# Patient Record
Sex: FEMALE | Race: WHITE | Employment: FULL TIME | URBAN - NONMETROPOLITAN AREA
[De-identification: names, ages, dates, MRNs, and addresses within clinical notes are randomized per-mention and may not be internally consistent; named-entity substitution may affect disease eponyms.]

---

## 2019-04-24 ENCOUNTER — OFFICE VISIT (OUTPATIENT)
Dept: ORTHOPEDIC SURGERY | Age: 54
End: 2019-04-24
Payer: COMMERCIAL

## 2019-04-24 VITALS — BODY MASS INDEX: 27.49 KG/M2 | HEIGHT: 65 IN | WEIGHT: 165 LBS

## 2019-04-24 DIAGNOSIS — M79.671 RIGHT FOOT PAIN: Primary | ICD-10-CM

## 2019-04-24 PROCEDURE — 99204 OFFICE O/P NEW MOD 45 MIN: CPT | Performed by: ORTHOPAEDIC SURGERY

## 2019-06-12 ENCOUNTER — OFFICE VISIT (OUTPATIENT)
Dept: INTERNAL MEDICINE CLINIC | Age: 54
End: 2019-06-12
Payer: COMMERCIAL

## 2019-06-12 VITALS
SYSTOLIC BLOOD PRESSURE: 112 MMHG | HEIGHT: 65 IN | OXYGEN SATURATION: 95 % | DIASTOLIC BLOOD PRESSURE: 88 MMHG | BODY MASS INDEX: 27.89 KG/M2 | WEIGHT: 167.4 LBS | HEART RATE: 72 BPM

## 2019-06-12 DIAGNOSIS — E55.9 VITAMIN D INSUFFICIENCY: ICD-10-CM

## 2019-06-12 DIAGNOSIS — M79.7 FIBROMYALGIA: ICD-10-CM

## 2019-06-12 DIAGNOSIS — L40.50 PSORIATIC ARTHRITIS (HCC): ICD-10-CM

## 2019-06-12 DIAGNOSIS — I10 ESSENTIAL HYPERTENSION: ICD-10-CM

## 2019-06-12 DIAGNOSIS — R73.03 PRE-DIABETES: ICD-10-CM

## 2019-06-12 DIAGNOSIS — K58.1 IRRITABLE BOWEL SYNDROME WITH CONSTIPATION: ICD-10-CM

## 2019-06-12 DIAGNOSIS — M51.36 DDD (DEGENERATIVE DISC DISEASE), LUMBAR: Primary | ICD-10-CM

## 2019-06-12 DIAGNOSIS — K76.0 FATTY LIVER: ICD-10-CM

## 2019-06-12 DIAGNOSIS — F32.A CHRONIC DEPRESSION: ICD-10-CM

## 2019-06-12 DIAGNOSIS — G47.33 OSA (OBSTRUCTIVE SLEEP APNEA): ICD-10-CM

## 2019-06-12 DIAGNOSIS — E78.5 MILD HYPERLIPIDEMIA: ICD-10-CM

## 2019-06-12 PROCEDURE — 99203 OFFICE O/P NEW LOW 30 MIN: CPT | Performed by: INTERNAL MEDICINE

## 2019-06-12 RX ORDER — TRAZODONE HYDROCHLORIDE 50 MG/1
50 TABLET ORAL NIGHTLY
Qty: 90 TABLET | Refills: 3 | Status: SHIPPED | OUTPATIENT
Start: 2019-06-12 | End: 2020-03-09 | Stop reason: ALTCHOICE

## 2019-06-12 RX ORDER — TRAZODONE HYDROCHLORIDE 50 MG/1
50 TABLET ORAL NIGHTLY
COMMUNITY
End: 2019-06-12 | Stop reason: SDUPTHER

## 2019-06-12 RX ORDER — DULOXETIN HYDROCHLORIDE 60 MG/1
60 CAPSULE, DELAYED RELEASE ORAL DAILY
COMMUNITY
End: 2019-06-12 | Stop reason: SDUPTHER

## 2019-06-12 RX ORDER — COLLAGENASE CLOSTRIDIUM HIST.
POWDER (EA) MISCELLANEOUS
COMMUNITY
End: 2020-03-04

## 2019-06-12 RX ORDER — ASCORBIC ACID 500 MG
500 TABLET ORAL DAILY
COMMUNITY
End: 2020-03-04

## 2019-06-12 RX ORDER — DULOXETIN HYDROCHLORIDE 30 MG/1
30 CAPSULE, DELAYED RELEASE ORAL DAILY
Qty: 30 CAPSULE | Refills: 3 | Status: SHIPPED | OUTPATIENT
Start: 2019-06-12 | End: 2019-08-23 | Stop reason: SDUPTHER

## 2019-06-12 RX ORDER — PREDNISONE 10 MG/1
10 TABLET ORAL DAILY
COMMUNITY
End: 2019-06-12 | Stop reason: ALTCHOICE

## 2019-06-12 RX ORDER — DULOXETIN HYDROCHLORIDE 60 MG/1
60 CAPSULE, DELAYED RELEASE ORAL DAILY
Qty: 90 CAPSULE | Refills: 3 | Status: SHIPPED | OUTPATIENT
Start: 2019-06-12 | End: 2020-03-04 | Stop reason: SDUPTHER

## 2019-06-12 ASSESSMENT — ENCOUNTER SYMPTOMS
DIARRHEA: 0
CHEST TIGHTNESS: 0
BLOOD IN STOOL: 0
CONSTIPATION: 1
SHORTNESS OF BREATH: 0

## 2019-06-12 ASSESSMENT — PATIENT HEALTH QUESTIONNAIRE - PHQ9
SUM OF ALL RESPONSES TO PHQ QUESTIONS 1-9: 1
1. LITTLE INTEREST OR PLEASURE IN DOING THINGS: 0
SUM OF ALL RESPONSES TO PHQ QUESTIONS 1-9: 1
2. FEELING DOWN, DEPRESSED OR HOPELESS: 1
SUM OF ALL RESPONSES TO PHQ9 QUESTIONS 1 & 2: 1
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
## 2019-06-23 PROBLEM — K58.1 IRRITABLE BOWEL SYNDROME WITH CONSTIPATION: Status: ACTIVE | Noted: 2019-06-23

## 2019-06-23 PROBLEM — E78.5 MILD HYPERLIPIDEMIA: Status: ACTIVE | Noted: 2019-06-23

## 2019-06-23 PROBLEM — F32.A CHRONIC DEPRESSION: Status: ACTIVE | Noted: 2019-06-23

## 2019-06-23 PROBLEM — M79.7: Status: ACTIVE | Noted: 2019-06-23

## 2019-06-23 PROBLEM — M51.36 DDD (DEGENERATIVE DISC DISEASE), LUMBAR: Status: ACTIVE | Noted: 2019-06-23

## 2019-06-23 PROBLEM — I10 ESSENTIAL HYPERTENSION: Status: ACTIVE | Noted: 2019-06-23

## 2019-06-23 PROBLEM — R73.03 PRE-DIABETES: Status: ACTIVE | Noted: 2019-06-23

## 2019-06-23 PROBLEM — G47.33 OSA (OBSTRUCTIVE SLEEP APNEA): Status: ACTIVE | Noted: 2019-06-23

## 2019-06-23 PROBLEM — K76.0 FATTY LIVER: Status: ACTIVE | Noted: 2019-06-23

## 2019-06-23 PROBLEM — L40.50 PSORIATIC ARTHRITIS (HCC): Status: ACTIVE | Noted: 2019-06-23

## 2019-06-23 PROBLEM — E55.9 VITAMIN D INSUFFICIENCY: Status: ACTIVE | Noted: 2019-06-23

## 2019-06-24 NOTE — ASSESSMENT & PLAN NOTE
Has been in therapy in the past and has found it helpful. Does not feel that she can currently afford therapy but will let me know if she feels otherwise. Doing ok with Trazodone for sleep and Cymbalta 90 mg qd.

## 2019-06-24 NOTE — ASSESSMENT & PLAN NOTE
Watching her diet- has lost 30 pounds on the keto diet. HbA1c is down to 5.6% from 6.0%. Continue to work on diet, monitor.

## 2019-06-24 NOTE — ASSESSMENT & PLAN NOTE
Successfully treated with LESI. Is more symptomatic. No recent MRI. Check MRI. Refer to Dr. Pia Pastrana for intervention.

## 2019-07-10 ENCOUNTER — HOSPITAL ENCOUNTER (OUTPATIENT)
Age: 54
Discharge: HOME | End: 2019-07-10
Payer: COMMERCIAL

## 2019-07-10 ENCOUNTER — HOSPITAL ENCOUNTER (OUTPATIENT)
Dept: MRI IMAGING | Age: 54
Discharge: HOME | End: 2019-07-10
Payer: COMMERCIAL

## 2019-07-10 DIAGNOSIS — M51.36 DDD (DEGENERATIVE DISC DISEASE), LUMBAR: ICD-10-CM

## 2019-07-10 DIAGNOSIS — I10 ESSENTIAL HYPERTENSION: ICD-10-CM

## 2019-07-10 DIAGNOSIS — E78.5 MILD HYPERLIPIDEMIA: ICD-10-CM

## 2019-07-10 DIAGNOSIS — E55.9 VITAMIN D INSUFFICIENCY: ICD-10-CM

## 2019-07-10 DIAGNOSIS — R73.03 PRE-DIABETES: ICD-10-CM

## 2019-07-10 LAB
A/G RATIO: 2 (ref 1.1–2.2)
ALBUMIN SERPL-MCNC: 4.5 G/DL (ref 3.4–5)
ALP BLD-CCNC: 89 U/L (ref 40–129)
ALT SERPL-CCNC: 16 U/L (ref 10–40)
ANION GAP SERPL CALCULATED.3IONS-SCNC: 14 MMOL/L (ref 3–16)
AST SERPL-CCNC: 15 U/L (ref 15–37)
BASOPHILS ABSOLUTE: 0 K/UL (ref 0–0.2)
BASOPHILS RELATIVE PERCENT: 0.8 %
BILIRUB SERPL-MCNC: 0.3 MG/DL (ref 0–1)
BUN BLDV-MCNC: 20 MG/DL (ref 7–20)
CALCIUM SERPL-MCNC: 9.5 MG/DL (ref 8.3–10.6)
CHLORIDE BLD-SCNC: 105 MMOL/L (ref 99–110)
CHOLESTEROL, TOTAL: 207 MG/DL (ref 0–199)
CO2: 25 MMOL/L (ref 21–32)
CREAT SERPL-MCNC: 0.7 MG/DL (ref 0.6–1.1)
EOSINOPHILS ABSOLUTE: 0.1 K/UL (ref 0–0.6)
EOSINOPHILS RELATIVE PERCENT: 3.6 %
GFR AFRICAN AMERICAN: >60
GFR NON-AFRICAN AMERICAN: >60
GLOBULIN: 2.3 G/DL
GLUCOSE BLD-MCNC: 112 MG/DL (ref 70–99)
HCT VFR BLD CALC: 40.8 % (ref 36–48)
HDLC SERPL-MCNC: 65 MG/DL (ref 40–60)
HEMOGLOBIN: 13.7 G/DL (ref 12–16)
LDL CHOLESTEROL CALCULATED: 126 MG/DL
LYMPHOCYTES ABSOLUTE: 1.4 K/UL (ref 1–5.1)
LYMPHOCYTES RELATIVE PERCENT: 38.2 %
MCH RBC QN AUTO: 31.6 PG (ref 26–34)
MCHC RBC AUTO-ENTMCNC: 33.7 G/DL (ref 31–36)
MCV RBC AUTO: 93.8 FL (ref 80–100)
MONOCYTES ABSOLUTE: 0.2 K/UL (ref 0–1.3)
MONOCYTES RELATIVE PERCENT: 6.8 %
NEUTROPHILS ABSOLUTE: 1.8 K/UL (ref 1.7–7.7)
NEUTROPHILS RELATIVE PERCENT: 50.6 %
PDW BLD-RTO: 13.8 % (ref 12.4–15.4)
PLATELET # BLD: 337 K/UL (ref 135–450)
PMV BLD AUTO: 8.3 FL (ref 5–10.5)
POTASSIUM SERPL-SCNC: 4.8 MMOL/L (ref 3.5–5.1)
RBC # BLD: 4.35 M/UL (ref 4–5.2)
SODIUM BLD-SCNC: 144 MMOL/L (ref 136–145)
TOTAL PROTEIN: 6.8 G/DL (ref 6.4–8.2)
TRIGL SERPL-MCNC: 81 MG/DL (ref 0–150)
TSH REFLEX: 1.33 UIU/ML (ref 0.27–4.2)
VITAMIN D 25-HYDROXY: 37.1 NG/ML
VLDLC SERPL CALC-MCNC: 16 MG/DL
WBC # BLD: 3.5 K/UL (ref 4–11)

## 2019-07-10 PROCEDURE — 82306 VITAMIN D 25 HYDROXY: CPT

## 2019-07-10 PROCEDURE — 72148 MRI LUMBAR SPINE W/O DYE: CPT

## 2019-07-10 PROCEDURE — 80053 COMPREHEN METABOLIC PANEL: CPT

## 2019-07-10 PROCEDURE — 36415 COLL VENOUS BLD VENIPUNCTURE: CPT

## 2019-07-10 PROCEDURE — 83036 HEMOGLOBIN GLYCOSYLATED A1C: CPT

## 2019-07-10 PROCEDURE — 85025 COMPLETE CBC W/AUTO DIFF WBC: CPT

## 2019-07-10 PROCEDURE — 84443 ASSAY THYROID STIM HORMONE: CPT

## 2019-07-10 PROCEDURE — 80061 LIPID PANEL: CPT

## 2019-07-11 ENCOUNTER — TELEPHONE (OUTPATIENT)
Dept: INTERNAL MEDICINE CLINIC | Age: 54
End: 2019-07-11

## 2019-07-11 DIAGNOSIS — D72.819 LEUKOPENIA, UNSPECIFIED TYPE: Primary | ICD-10-CM

## 2019-07-11 LAB
ESTIMATED AVERAGE GLUCOSE: 122.6 MG/DL
HBA1C MFR BLD: 5.9 %

## 2019-07-12 ENCOUNTER — OFFICE VISIT (OUTPATIENT)
Dept: ORTHOPEDIC SURGERY | Age: 54
End: 2019-07-12
Payer: COMMERCIAL

## 2019-07-12 VITALS
WEIGHT: 167 LBS | SYSTOLIC BLOOD PRESSURE: 132 MMHG | DIASTOLIC BLOOD PRESSURE: 78 MMHG | BODY MASS INDEX: 27.82 KG/M2 | HEIGHT: 65 IN

## 2019-07-12 DIAGNOSIS — M47.816 SPONDYLOSIS WITHOUT MYELOPATHY OR RADICULOPATHY, LUMBAR REGION: ICD-10-CM

## 2019-07-12 DIAGNOSIS — M47.816 FACET HYPERTROPHY OF LUMBAR REGION: Primary | ICD-10-CM

## 2019-07-12 DIAGNOSIS — M51.36 DDD (DEGENERATIVE DISC DISEASE), LUMBAR: ICD-10-CM

## 2019-07-12 PROCEDURE — 99244 OFF/OP CNSLTJ NEW/EST MOD 40: CPT | Performed by: PHYSICIAN ASSISTANT

## 2019-07-12 RX ORDER — MELOXICAM 15 MG/1
15 TABLET ORAL DAILY
Qty: 30 TABLET | Refills: 0 | Status: SHIPPED | OUTPATIENT
Start: 2019-07-12 | End: 2019-07-17 | Stop reason: SINTOL

## 2019-07-12 NOTE — PROGRESS NOTES
Relation Age of Onset    Cancer Mother         gallbladder cancer    High Blood Pressure Mother     Alcohol Abuse Mother     High Blood Pressure Father    Tere Schreiber Cancer Sister         small cell lung cancer (smoker)    Heart Disease Maternal Grandmother     Heart Disease Maternal Grandfather     Stroke Maternal Grandfather     Heart Disease Paternal Grandmother     Heart Disease Paternal Grandfather     No Known Problems Half-Brother     No Known Problems Half-Sister     Other Half-Sibling         skin cancer    Other Half-Sister         blood clots    Substance Abuse Daughter 32        in remission          REVIEW OF SYSTEMS: Full ROS reviewed & scanned from 7/15/2019           PHYSICAL EXAM:    Vitals: Blood pressure 132/78, height 5' 5\" (1.651 m), weight 167 lb (75.8 kg). GENERAL EXAM:  · General Apparence: Patient is adequately groomed with no evidence of malnutrition. · Psychiatric: Orientation: The patient is oriented to time, place and person. The patient's mood and affect are appropriate   · Vascular: Examination reveals no swelling and palpation reveals no tenderness in upper or lower extremities. Good capillary refill. · The lymphatic examination of the neck, axillae and groin reveals all areas to be without enlargement or induration   Sensation is intact without deficit in the upper and lower extremities to light touch and pinprick  · Coordination of the upper and lower extremities are normal.    CERVICAL EXAMINATION:  · Inspection: Local inspection shows no step-off or bruising. Cervical alignment is normal. No instability is noted. · Palpation and Percussion: No evidence of tenderness at the midline. Paraspinal tenderness is not present. There is no paraspinal spasm. · Range of Motion:  pain-free ROM   · Strength: 5/5 bilateral upper extremities  · Special Tests:   Spurling's and Strong's are negative bilaterally.     Cohn and Impingement tests are negative right lower extremity does not show any tenderness, deformity or injury. Range of motion is unremarkable. There is no gross instability. There are no rashes, ulcerations or lesions. Strength and tone are normal. No atrophy or abnormal movements are noted. · LEFT LOWER EXTREMITY:  Inspection/examination of the left lower extremity does not show any tenderness, deformity or injury. Range of motion is unremarkable. There is no gross instability. There are no rashes, ulcerations or lesions. Strength and tone are normal. No atrophy or abnormal movements are noted. Diagnostic Testing:    Xrays:   None  MRI or CT:  MRI of the Lumbar Spine from 7/10/19   Impression   Mild neural foraminal narrowing.  No significant spinal canal stenosis.      EMG:  None  Results for orders placed or performed during the hospital encounter of 07/10/19   Lipid Panel   Result Value Ref Range    Cholesterol, Total 207 (H) 0 - 199 mg/dL    Triglycerides 81 0 - 150 mg/dL    HDL 65 (H) 40 - 60 mg/dL    LDL Calculated 126 (H) <100 mg/dL    VLDL Cholesterol Calculated 16 Not Established mg/dL   Hemoglobin A1C   Result Value Ref Range    Hemoglobin A1C 5.9 See comment %    eAG 122.6 mg/dL   Comprehensive Metabolic Panel   Result Value Ref Range    Sodium 144 136 - 145 mmol/L    Potassium 4.8 3.5 - 5.1 mmol/L    Chloride 105 99 - 110 mmol/L    CO2 25 21 - 32 mmol/L    Anion Gap 14 3 - 16    Glucose 112 (H) 70 - 99 mg/dL    BUN 20 7 - 20 mg/dL    CREATININE 0.7 0.6 - 1.1 mg/dL    GFR Non-African American >60 >60    GFR African American >60 >60    Calcium 9.5 8.3 - 10.6 mg/dL    Total Protein 6.8 6.4 - 8.2 g/dL    Alb 4.5 3.4 - 5.0 g/dL    Albumin/Globulin Ratio 2.0 1.1 - 2.2    Total Bilirubin 0.3 0.0 - 1.0 mg/dL    Alkaline Phosphatase 89 40 - 129 U/L    ALT 16 10 - 40 U/L    AST 15 15 - 37 U/L    Globulin 2.3 g/dL   CBC Auto Differential   Result Value Ref Range    WBC 3.5 (L) 4.0 - 11.0 K/uL    RBC 4.35 4.00 - 5.20 M/uL    Hemoglobin 13.7

## 2019-07-17 ENCOUNTER — TELEPHONE (OUTPATIENT)
Dept: ORTHOPEDIC SURGERY | Age: 54
End: 2019-07-17

## 2019-07-17 ENCOUNTER — OFFICE VISIT (OUTPATIENT)
Dept: INTERNAL MEDICINE CLINIC | Age: 54
End: 2019-07-17
Payer: COMMERCIAL

## 2019-07-17 VITALS
HEART RATE: 72 BPM | WEIGHT: 170.2 LBS | OXYGEN SATURATION: 96 % | BODY MASS INDEX: 28.32 KG/M2 | DIASTOLIC BLOOD PRESSURE: 88 MMHG | SYSTOLIC BLOOD PRESSURE: 130 MMHG

## 2019-07-17 DIAGNOSIS — M51.36 DDD (DEGENERATIVE DISC DISEASE), LUMBAR: ICD-10-CM

## 2019-07-17 DIAGNOSIS — R10.12 ACUTE LUQ PAIN: Primary | ICD-10-CM

## 2019-07-17 PROCEDURE — 99214 OFFICE O/P EST MOD 30 MIN: CPT | Performed by: INTERNAL MEDICINE

## 2019-07-17 RX ORDER — TRAMADOL HYDROCHLORIDE 50 MG/1
50 TABLET ORAL EVERY 8 HOURS PRN
Qty: 30 TABLET | Refills: 0 | Status: SHIPPED | OUTPATIENT
Start: 2019-07-17 | End: 2019-10-29 | Stop reason: SDUPTHER

## 2019-07-17 ASSESSMENT — ENCOUNTER SYMPTOMS
SHORTNESS OF BREATH: 0
BLOOD IN STOOL: 0
BACK PAIN: 1
CONSTIPATION: 0
CHEST TIGHTNESS: 0
DIARRHEA: 0
ABDOMINAL PAIN: 1

## 2019-07-17 NOTE — ASSESSMENT & PLAN NOTE
Patient is seen Dr. Luba Young and is scheduled for lumbar steroid epidural injections in August.  She does not tolerate NSAIDs. Even Mobic upset her stomach. Add tramadol 50 mg 1/2-1 every 8 hours as needed #30. She is well aware of the addiction potential which we again discussed today. She does have a strong family history of substance abuse.

## 2019-07-17 NOTE — PROGRESS NOTES
No Known Allergies   Past Medical History:   Diagnosis Date    Chronic depression     DDD (degenerative disc disease), lumbar     Fibromyalgia     HTN (hypertension)     KARTHIK (obstructive sleep apnea)     Psoriatic arthritis ([facility] [address]       Past Surgical History:   Procedure Laterality Date    BLADDER SUSPENSION  2004     SECTION  08/31/1987    x1    CHOLECYSTECTOMY  2000    ENDOMETRIAL ABLATION  2007    HYSTERECTOMY, TOTAL ABDOMINAL  2009    heavy menses    NASAL SEPTUM SURGERY  2005    TURBINATE RESECTION  2007      Social History     Socioeconomic History    Marital status:      Spouse name: Not on file    Number of children: Not on file    Years of education: Not on file    Highest education level: Not on file   Occupational History    Not on file   Social Needs    Financial resource strain: Not on file    Food insecurity:     Worry: Not on file     Inability: Not on file    Transportation needs:     Medical: Not on file     Non-medical: Not on file   Tobacco Use    Smoking status: Never Smoker    Smokeless tobacco: Never Used   Substance and Sexual Activity    Alcohol use: Not on file     Comment: occasional     Drug use: Never    Sexual activity: Not on file   Lifestyle    Physical activity:     Days per week: Not on file     Minutes per session: Not on file    Stress: Not on file   Relationships    Social connections:     Talks on phone: Not on file     Gets together: Not on file     Attends [employer] service: Not on file     Active member of club or organization: Not on file     Attends meetings of clubs or organizations: Not on file     Relationship status: Not on file    Intimate partner violence:     Fear of current or ex partner: Not on file     Emotionally abused: Not on file     Physically abused: Not on file     Forced sexual activity: Not on file   Other Topics Concern    Not on file   Social History Narrative    Not on file     Family History Problem Relation Age of Onset    Cancer Mother         gallbladder cancer    High Blood Pressure Mother     Alcohol Abuse Mother     High Blood Pressure Father    [de-identified] Cancer Sister         small cell lung cancer (smoker)    Heart Disease Maternal Grandmother     Heart Disease Maternal Grandfather     Stroke Maternal Grandfather     Heart Disease Paternal Grandmother     Heart Disease Paternal Grandfather     No Known Problems Half-Brother     No Known Problems Half-Sister     Other Half-Sibling         skin cancer    Other Half-Sister         blood clots    Substance Abuse Daughter 32        in remission         Outpatient Medications Prior to Visit   Medication Sig Dispense Refill    vitamin C (ASCORBIC ACID) 500 MG tablet Take 500 mg by mouth daily      Collagenase POWD by Does not apply route      DULoxetine (CYMBALTA) 60 MG extended release capsule Take 1 capsule by mouth daily 90 capsule 3    traZODone (DESYREL) 50 MG tablet Take 1 tablet by mouth nightly 90 tablet 3    metoprolol tartrate (LOPRESSOR) 25 MG tablet Take 1 tablet by mouth 2 times daily 180 tablet 3    DULoxetine (CYMBALTA) 30 MG extended release capsule Take 1 capsule by mouth daily 30 capsule 3    meloxicam (MOBIC) 15 MG tablet Take 1 tablet by mouth daily 30 tablet 0     No facility-administered medications prior to visit. Patient'spast medical history, surgical history, family history, medications,  and allergies  were all reviewed and updated as appropriate today. Review of Systems   Constitutional: Negative for appetite change, fatigue and fever. Respiratory: Negative for chest tightness and shortness of breath. Cardiovascular: Negative for chest pain. Gastrointestinal: Positive for abdominal pain. Negative for blood in stool, constipation and diarrhea. Genitourinary: Negative for dysuria. Musculoskeletal: Positive for back pain. Skin: Negative for rash.        /88   Pulse 72   Wt 170 lb 3.2

## 2019-07-17 NOTE — TELEPHONE ENCOUNTER
PATIENT CALLED AND STATED THAT THE PRESCRIPTION THAT SHE'S TAKING IS UPSETTING HER STOMACH, AND SHE NEEDS TO RS HER PROCEDURE. PATIENT CAN BE REACHED @[phone].

## 2019-07-18 ENCOUNTER — TELEPHONE (OUTPATIENT)
Dept: INTERNAL MEDICINE CLINIC | Age: 54
End: 2019-07-18

## 2019-07-18 DIAGNOSIS — R10.12 ACUTE LUQ PAIN: Primary | ICD-10-CM

## 2019-07-23 NOTE — PROGRESS NOTES
Carmin Hodgkins, MD  [facility]) Physicians  [facility] [place] [facility] Rhe[facility]  Rheumatology Consult Note    HISTORY OF PRESENT ILLNESS:    The pt is a 47 y.o. female referred by Anant Levine DO for PsA. Pt reports longstanding PsO, she follows w/  Dermatology. She reports mildly active psoriatic lesions in her palms and R foot, most recent flare was 3 yrs ago. Pt states she was Dx w/ fibromyalgia in her 35s. Pt states she was Dx w/ PsA by a rheumatologist in [place], [place] approximately 3.5 yrs ago. Pt states she tried oral MTX which gave her thrush, she thinks this \"worked a little bit\". SSZ also gave her thrush. Humira was [employer]" but it stopped working after a few months. Pt states she was then switched to Stelara which per pt worked mainly for her joints - she felt this worked but stopped after two months d/t insurance issues. She felt both her PsO and PsA had remained largely in \"remission\" until recently. Pt reports chronic low back and buttock pain since her 30s - pain is constant and exacerbated by prolonged sitting, standing, as well as standing. She reports morning stiffness in her lower back lasting an hr, denies nocturnal awakening from pain. Pt has established care w/ PM (Dr. Live Hernandez) for chronic back pain 2/2 DDD, spondylosis and facet arthropathy of L-spine, YEHUDA previously helped. Pt states she recently had a MRI of L-spine and has established care w/ a spine specialist who suggested facet injections but she has concerns about the safety of these injections - she is worried if they would affect her \"bone density\". Pt reports a 5-6 month Hx of joint pain and stiffness in her hands. She wakes up in the middle of the night feeling stiff in her hands. Morning stiffness lasts >30 min. Joint pain in the hands is exacerbated by activity such as water coloring. She reports generalized swelling in her hands in the morning but denies Hx of dactylitis.   She Sister         small cell lung cancer (smoker)    Heart Disease Maternal Grandmother     Heart Disease Maternal Grandfather     Stroke Maternal Grandfather     Heart Disease Paternal Grandmother     Heart Disease Paternal Grandfather     No Known Problems Half-Brother     No Known Problems Half-Sister     Other Half-Sibling         skin cancer    Other Half-Sister         blood clots    Substance Abuse Daughter 32        in remission        MEDICATIONS:    Current Outpatient Medications:     Cholecalciferol (VITAMIN D3) 2000 units CAPS, Take by mouth, Disp: , Rfl:     traMADol (ULTRAM) 50 MG tablet, Take 1 tablet by mouth every 8 hours as needed for Pain for up to 10 days. Intended supply: 5 days. Take lowest dose possible to manage pain, Disp: 30 tablet, Rfl: 0    vitamin C (ASCORBIC ACID) 500 MG tablet, Take 500 mg by mouth daily, Disp: , Rfl:     Collagenase POWD, by Does not apply route, Disp: , Rfl:     DULoxetine (CYMBALTA) 60 MG extended release capsule, Take 1 capsule by mouth daily, Disp: 90 capsule, Rfl: 3    traZODone (DESYREL) 50 MG tablet, Take 1 tablet by mouth nightly, Disp: 90 tablet, Rfl: 3    metoprolol tartrate (LOPRESSOR) 25 MG tablet, Take 1 tablet by mouth 2 times daily, Disp: 180 tablet, Rfl: 3    DULoxetine (CYMBALTA) 30 MG extended release capsule, Take 1 capsule by mouth daily, Disp: 30 capsule, Rfl: 3    ALLERGIES:  Patient has no known allergies.     PHYSICAL EXAM:    Vitals:    /67 (Site: Right Upper Arm, Position: Sitting, Cuff Size: Medium Adult)   Pulse 61   Ht 5' 5\" (1.651 m)   Wt 173 lb (78.5 kg)   BMI 28.79 kg/m²     GEN: AAOx3, in NAD, well-appearing  HEAD: normocephalic, atraumatic  EYES: EOMI, PERRLA, no injection or icterus  NOSE: no nasal ulcers or nasal drainage  ORAL CAVITY: moist oral mucosa w/ good saliva pooling, no oral lesions, no evidence of thrush, no evidence of parotid gland enlargement  NECK: supple w/ FROM, of evidence of chondrocalcinosis    XR HAND RIGHT (MIN 3 VIEWS)     Standing Status:   Future     Standing Expiration Date:   7/24/2020     Scheduling Instructions:      Evaluate for joint space narrowing, erosion, and chondrocalcinosis     Order Specific Question:   Reason for exam:     Answer:   Hx of psoriatic arthritis, Evaluate for joint space narrowing, erosion, and chondrocalcinosis    C-Reactive Protein     Standing Status:   Future     Number of Occurrences:   1     Standing Expiration Date:   1/24/2020    Sedimentation Rate     Standing Status:   Future     Number of Occurrences:   1     Standing Expiration Date:   1/24/2020    Hepatitis B Core Antibody, Total     Standing Status:   Future     Number of Occurrences:   1     Standing Expiration Date:   8/24/2019    Hepatitis C Antibody     Standing Status:   Future     Number of Occurrences:   1     Standing Expiration Date:   8/24/2019    Hepatitis B Surface Antigen     Standing Status:   Future     Number of Occurrences:   1     Standing Expiration Date:   2/90/5304    Cyclic Citrul Peptide Antibody, IgG     Standing Status:   Future     Number of Occurrences:   1     Standing Expiration Date:   1/24/2020    Rheumatoid Factor     Standing Status:   Future     Number of Occurrences:   1     Standing Expiration Date:   1/24/2020    JULIANO Reflex to Antibody Cascade     Standing Status:   Future     Number of Occurrences:   1     Standing Expiration Date:   1/24/2020    Anti SSA     Standing Status:   Future     Number of Occurrences:   1     Standing Expiration Date:   1/24/2020    Anti SSB     Standing Status:   Future     Number of Occurrences:   1     Standing Expiration Date:   1/24/2020    C3 Complement     Standing Status:   Future     Number of Occurrences:   1     Standing Expiration Date:   1/24/2020    C4 Complement     Standing Status:   Future     Number of Occurrences:   1     Standing Expiration Date:   1/24/2020    Urinalysis with Microscopic Standing Status:   Future     Number of Occurrences:   1     Standing Expiration Date:   1/24/2020     Order Specific Question:   SPECIFY(EX-CATH,MIDSTREAM,CYSTO,ETC)? Answer:   midstream    HLA-B27 Antigen     Standing Status:   Future     Number of Occurrences:   1     Standing Expiration Date:   8/24/2019    HIV Screen     Standing Status:   Future     Number of Occurrences:   1     Standing Expiration Date:   8/24/2019     Outpatient Encounter Medications as of 7/24/2019   Medication Sig Dispense Refill    Cholecalciferol (VITAMIN D3) 2000 units CAPS Take by mouth      traMADol (ULTRAM) 50 MG tablet Take 1 tablet by mouth every 8 hours as needed for Pain for up to 10 days. Intended supply: 5 days. Take lowest dose possible to manage pain 30 tablet 0    vitamin C (ASCORBIC ACID) 500 MG tablet Take 500 mg by mouth daily      Collagenase POWD by Does not apply route      DULoxetine (CYMBALTA) 60 MG extended release capsule Take 1 capsule by mouth daily 90 capsule 3    traZODone (DESYREL) 50 MG tablet Take 1 tablet by mouth nightly 90 tablet 3    metoprolol tartrate (LOPRESSOR) 25 MG tablet Take 1 tablet by mouth 2 times daily 180 tablet 3    DULoxetine (CYMBALTA) 30 MG extended release capsule Take 1 capsule by mouth daily 30 capsule 3     No facility-administered encounter medications on file as of 7/24/2019. Return in about 2 weeks (around 8/7/2019) for lab result discussion and treatment plan. 7/24/2019 9:45 AM      Sending consult note to referring provider Shivam Forrest DO. Thank you very much for allowing me to participate in this pt's care. Please do not hesitate to contact me if I can be of further assistance. Tyler Farmer MD  [facility]) Physicians  [facility] [place] and Rheumat[facility]  [address] [place] [address] Dr Guzman S White , [address] [place] [address]:[phone]  SAMI:[phone]    NOTE: This report is transcribed by using voice recognition software dragon.  Every effort is made

## 2019-07-24 ENCOUNTER — OFFICE VISIT (OUTPATIENT)
Dept: RHEUMATOLOGY | Age: 54
End: 2019-07-24
Payer: COMMERCIAL

## 2019-07-24 ENCOUNTER — HOSPITAL ENCOUNTER (OUTPATIENT)
Dept: GENERAL RADIOLOGY | Age: 54
Discharge: HOME | End: 2019-07-24
Payer: COMMERCIAL

## 2019-07-24 ENCOUNTER — HOSPITAL ENCOUNTER (OUTPATIENT)
Age: 54
Discharge: HOME | End: 2019-07-24
Payer: COMMERCIAL

## 2019-07-24 ENCOUNTER — HOSPITAL ENCOUNTER (OUTPATIENT)
Dept: CT IMAGING | Age: 54
Discharge: HOME | End: 2019-07-24
Payer: COMMERCIAL

## 2019-07-24 VITALS
BODY MASS INDEX: 28.82 KG/M2 | DIASTOLIC BLOOD PRESSURE: 67 MMHG | HEIGHT: 65 IN | WEIGHT: 173 LBS | SYSTOLIC BLOOD PRESSURE: 111 MMHG | HEART RATE: 61 BPM

## 2019-07-24 DIAGNOSIS — M53.3 COCCYGEAL PAIN, ACUTE: ICD-10-CM

## 2019-07-24 DIAGNOSIS — L40.50 PSORIATIC ARTHRITIS (HCC): ICD-10-CM

## 2019-07-24 DIAGNOSIS — Z79.899 HIGH RISK MEDICATION USE: ICD-10-CM

## 2019-07-24 DIAGNOSIS — D72.819 LEUKOPENIA, UNSPECIFIED TYPE: ICD-10-CM

## 2019-07-24 DIAGNOSIS — G89.29 CHRONIC BACK PAIN GREATER THAN 3 MONTHS DURATION: ICD-10-CM

## 2019-07-24 DIAGNOSIS — L40.50 PSORIATIC ARTHRITIS (HCC): Primary | ICD-10-CM

## 2019-07-24 DIAGNOSIS — R10.12 ACUTE LUQ PAIN: ICD-10-CM

## 2019-07-24 DIAGNOSIS — M51.36 DDD (DEGENERATIVE DISC DISEASE), LUMBAR: ICD-10-CM

## 2019-07-24 DIAGNOSIS — M35.00 SICCA COMPLEX (HCC): ICD-10-CM

## 2019-07-24 DIAGNOSIS — M54.9 CHRONIC BACK PAIN GREATER THAN 3 MONTHS DURATION: ICD-10-CM

## 2019-07-24 DIAGNOSIS — L40.9 PSORIASIS: ICD-10-CM

## 2019-07-24 DIAGNOSIS — L65.9 HAIR THINNING: ICD-10-CM

## 2019-07-24 DIAGNOSIS — M79.7 FIBROMYALGIA: ICD-10-CM

## 2019-07-24 PROCEDURE — 74176 CT ABD & PELVIS W/O CONTRAST: CPT

## 2019-07-24 PROCEDURE — 73562 X-RAY EXAM OF KNEE 3: CPT

## 2019-07-24 PROCEDURE — 72200 X-RAY EXAM SI JOINTS: CPT

## 2019-07-24 PROCEDURE — 99244 OFF/OP CNSLTJ NEW/EST MOD 40: CPT | Performed by: INTERNAL MEDICINE

## 2019-07-24 PROCEDURE — 73130 X-RAY EXAM OF HAND: CPT

## 2019-07-24 PROCEDURE — 6360000004 HC RX CONTRAST MEDICATION: Performed by: INTERNAL MEDICINE

## 2019-07-24 RX ORDER — ACETAMINOPHEN 160 MG
TABLET,DISINTEGRATING ORAL
COMMUNITY

## 2019-07-24 RX ADMIN — IOHEXOL 50 ML: 240 INJECTION, SOLUTION INTRATHECAL; INTRAVASCULAR; INTRAVENOUS; ORAL at 19:31

## 2019-07-25 ENCOUNTER — TELEPHONE (OUTPATIENT)
Dept: INTERNAL MEDICINE CLINIC | Age: 54
End: 2019-07-25

## 2019-07-26 ENCOUNTER — TELEPHONE (OUTPATIENT)
Dept: INTERNAL MEDICINE CLINIC | Age: 54
End: 2019-07-26

## 2019-07-29 ENCOUNTER — TELEPHONE (OUTPATIENT)
Dept: ORTHOPEDIC SURGERY | Age: 54
End: 2019-07-29

## 2019-08-05 ENCOUNTER — TELEPHONE (OUTPATIENT)
Dept: ORTHOPEDIC SURGERY | Age: 54
End: 2019-08-05

## 2019-08-05 NOTE — TELEPHONE ENCOUNTER
Patients procedure scheduled for 8/7/2109 has been denied. Can we do a peer to peer on Tuesday? Information is in previous message attached. Thanks.

## 2019-08-07 ENCOUNTER — TELEPHONE (OUTPATIENT)
Dept: ORTHOPEDIC SURGERY | Age: 54
End: 2019-08-07

## 2019-08-12 NOTE — PROGRESS NOTES
this is exacerbated by physical activity. Worst pain is in her back most of the time. Back of neck hurts. She reports frequent tension headaches. She reports generalized stiffness (hands, back) lasting 30 min to 1 hr which improves w/ activity. Pt states she cancelled her facet joint injections d/t high out of pocket cost.    Skin has been clear for the past 3 yrs.     Rheumatologic ROS:  Constitutional: denies chronic fatigue, fever/chills, night sweats, unintentional weight loss  Integumentary: +chronic diffuse hair thinning, denies photosensitivity, rash, or Sx of Raynaud's phenomenon  Eyes: +dry eyes lately she feels \"they're irritating\" has not had any recent eye exam, denies redness or pain, visual disturbance, or floaters  Ears: denies hearing loss, tinnitus, vertigo, or recurrent ear infections  Nose: denies nasal ulcers or recurrent sinusitis  Oral cavity: +dry mouth, denies oral ulcers  Cardiovascular: denies CP, palpitations, Hx of pericardial effusion or pericarditis  Respiratory: denies SOB, cough, hemoptysis, or pleurisy  Gastrointestinal: +chronic GERD, denies chronic diarrhea or bloody stools  Genitourinary: +chronic foul smelling urine (per pt smells like \"chicken and bone broth\") and increased urinary hesitancy, denies frothy urine or Hx of nephrolithiasis  Hematologic/Lymphatic: denies abnormal bruising or bleeding, denies Hx of blood clots or recurrent miscarriages, denies swollen LNs  Musculoskeletal:  refer to above HPI   Neurological: denies focal weakness, paresthesias/hyperesthesias or change in sensation, denies Hx of seizure, denies change in gait, balance, or memory  Psychiatric: feels depression and anxiety are well controlled on Cymbalta  Endocrine: denies cold or heat intolerance  Allergic/Immunologic: denies nasal congestion, chronic asthma, or hives    No Known Allergies    Past Medical History:        Diagnosis Date    Chronic depression     DDD (degenerative disc disease), lumbar     Fibromyalgia     HTN (hypertension)     KARTHIK (obstructive sleep apnea)     Psoriatic arthritis (HCC)        Past Surgical History:        Procedure Laterality Date    BLADDER SUSPENSION  2004     SECTION  08/31/1987    x1    CHOLECYSTECTOMY  2000    ENDOMETRIAL ABLATION  2007    HYSTERECTOMY, TOTAL ABDOMINAL  2009    heavy menses    NASAL SEPTUM SURGERY  2005    TURBINATE RESECTION         Medications:    Current Outpatient Medications   Medication Sig Dispense Refill    celecoxib (CELEBREX) 100 MG capsule Take 1 capsule by mouth 2 times daily 180 capsule 2    gabapentin (NEURONTIN) 300 MG capsule Take 1 capsule by mouth 3 times daily for 180 days. Intended supply: 90 days 270 capsule 0    predniSONE (DELTASONE) 10 MG tablet Take 2 tablets by mouth daily for 10 days, THEN 1 tablet daily for 10 days, THEN 0.5 tablets daily for 10 days. 35 tablet 1    Cholecalciferol (VITAMIN D3) 2000 units CAPS Take by mouth      vitamin C (ASCORBIC ACID) 500 MG tablet Take 500 mg by mouth daily      Collagenase POWD by Does not apply route      DULoxetine (CYMBALTA) 60 MG extended release capsule Take 1 capsule by mouth daily 90 capsule 3    traZODone (DESYREL) 50 MG tablet Take 1 tablet by mouth nightly 90 tablet 3    metoprolol tartrate (LOPRESSOR) 25 MG tablet Take 1 tablet by mouth 2 times daily 180 tablet 3    DULoxetine (CYMBALTA) 30 MG extended release capsule Take 1 capsule by mouth daily 30 capsule 3     No current facility-administered medications for this visit.          OBJECTIVE:  Physical Exam:    /89 (Site: Left Lower Arm, Position: Sitting, Cuff Size: Medium Adult)   Pulse 64   Wt 172 lb (78 kg)   BMI 28.62 kg/m²     GEN: AAOx3, in NAD, well-appearing  HEAD: normocephalic, atraumatic  EYES: EOMI, PERRLA, no injection or icterus  NOSE: no nasal ulcers or nasal drainage  ORAL CAVITY: moist oral mucosa w/ good saliva pooling, no oral lesions, no evidence of thrush, no 07/10/2019    BUN 20 07/10/2019    CREATININE 0.7 07/10/2019    GLUCOSE 112 (H) 07/10/2019    CALCIUM 9.5 07/10/2019    PROT 6.8 07/10/2019    LABALBU 4.5 07/10/2019    BILITOT 0.3 07/10/2019    ALKPHOS 89 07/10/2019    AST 15 07/10/2019    ALT 16 07/10/2019    LABGLOM >60 07/10/2019    GFRAA >60 07/10/2019    AGRATIO 2.0 07/10/2019    GLOB 2.3 07/10/2019     Vitamin D 25 hydroxy (7/10/19): 37.1  TSH (7/10/19) wnl  HgbA1c (7/10/19) 5.9    Labs from 7/24/19:  CRP and ESR wnl  Negative RF, CCP, HLA B27  Negative JULIANO, SSA, SSB  C3 and C4 wnl  Negative hepatitis B and C serologies, Quantiferon TB, HIV screen    Imaging:   I personally reviewed interval imaging and discussed w/ the pt in detail which included:  MRI L-spine (7/10/19): Mild neural foraminal narrowing.  No significant spinal canal stenosis. X-rays (7/24/19):  R hand:  No fracture or dislocation. No underlying degenerative changes. Joint spaces are normal with no significant osteophytes. No inflammatory changes. No erosions. No soft tissue swelling. L hand:  No fracture or dislocation. No underlying degenerative changes. Joint spaces are normal with no significant osteophytes. No inflammatory changes. No erosions. No soft tissue swelling. R knee:  No fracture or dislocation. No underlying degenerative changes. Joint spaces are normal with no significant osteophytes. No inflammatory changes. No erosions. No soft tissue swelling. L knee:  No fracture or dislocation. No underlying degenerative changes. Joint spaces are normal with no significant osteophytes. No inflammatory changes. No erosions. No soft tissue swelling. SI joints:  Normal, symmetric appearance of the b/l SI joints. No widening or significant sclerosis. No erosions are appreciated. Pelvic bones are otherwise unremarkable. No significant soft tissue findings.      I independently reviewed above X-rays - mild OA changes in the b/l PIP joints otherwise well preserved joint spaces,

## 2019-08-13 ENCOUNTER — OFFICE VISIT (OUTPATIENT)
Dept: RHEUMATOLOGY | Age: 54
End: 2019-08-13
Payer: COMMERCIAL

## 2019-08-13 ENCOUNTER — TELEPHONE (OUTPATIENT)
Dept: RHEUMATOLOGY | Age: 54
End: 2019-08-13

## 2019-08-13 VITALS
HEART RATE: 64 BPM | DIASTOLIC BLOOD PRESSURE: 89 MMHG | BODY MASS INDEX: 28.62 KG/M2 | SYSTOLIC BLOOD PRESSURE: 133 MMHG | WEIGHT: 172 LBS

## 2019-08-13 DIAGNOSIS — R82.90 FOUL SMELLING URINE: ICD-10-CM

## 2019-08-13 DIAGNOSIS — M79.7 FIBROMYALGIA: ICD-10-CM

## 2019-08-13 DIAGNOSIS — R35.0 URINE FREQUENCY: ICD-10-CM

## 2019-08-13 DIAGNOSIS — L40.50 PSORIATIC ARTHRITIS (HCC): Primary | ICD-10-CM

## 2019-08-13 PROCEDURE — 99214 OFFICE O/P EST MOD 30 MIN: CPT | Performed by: INTERNAL MEDICINE

## 2019-08-13 RX ORDER — GABAPENTIN 300 MG/1
300 CAPSULE ORAL 3 TIMES DAILY
Qty: 270 CAPSULE | Refills: 0 | Status: SHIPPED | OUTPATIENT
Start: 2019-08-13 | End: 2019-11-25

## 2019-08-13 RX ORDER — PREDNISONE 10 MG/1
TABLET ORAL
Qty: 35 TABLET | Refills: 1 | Status: SHIPPED | OUTPATIENT
Start: 2019-08-13 | End: 2019-09-12

## 2019-08-13 RX ORDER — CELECOXIB 100 MG/1
100 CAPSULE ORAL 2 TIMES DAILY
Qty: 180 CAPSULE | Refills: 2 | Status: SHIPPED | OUTPATIENT
Start: 2019-08-13 | End: 2019-08-23 | Stop reason: SINTOL

## 2019-08-15 ENCOUNTER — TELEPHONE (OUTPATIENT)
Dept: INTERNAL MEDICINE CLINIC | Age: 54
End: 2019-08-15

## 2019-08-15 LAB — URINE CULTURE, ROUTINE: NORMAL

## 2019-08-19 ENCOUNTER — TELEPHONE (OUTPATIENT)
Dept: INTERNAL MEDICINE CLINIC | Age: 54
End: 2019-08-19

## 2019-08-23 ENCOUNTER — OFFICE VISIT (OUTPATIENT)
Dept: INTERNAL MEDICINE CLINIC | Age: 54
End: 2019-08-23
Payer: COMMERCIAL

## 2019-08-23 VITALS
HEIGHT: 65 IN | SYSTOLIC BLOOD PRESSURE: 110 MMHG | WEIGHT: 174.8 LBS | DIASTOLIC BLOOD PRESSURE: 76 MMHG | HEART RATE: 68 BPM | BODY MASS INDEX: 29.12 KG/M2

## 2019-08-23 DIAGNOSIS — M79.7 FIBROMYALGIA: ICD-10-CM

## 2019-08-23 DIAGNOSIS — K58.1 IRRITABLE BOWEL SYNDROME WITH CONSTIPATION: ICD-10-CM

## 2019-08-23 DIAGNOSIS — M54.2 NECK PAIN: ICD-10-CM

## 2019-08-23 DIAGNOSIS — R10.13 EPIGASTRIC PAIN: ICD-10-CM

## 2019-08-23 DIAGNOSIS — F32.A CHRONIC DEPRESSION: Primary | ICD-10-CM

## 2019-08-23 PROBLEM — R10.12 ACUTE LUQ PAIN: Status: RESOLVED | Noted: 2019-07-17 | Resolved: 2019-08-23

## 2019-08-23 PROCEDURE — 99214 OFFICE O/P EST MOD 30 MIN: CPT | Performed by: INTERNAL MEDICINE

## 2019-08-23 RX ORDER — DULOXETIN HYDROCHLORIDE 30 MG/1
30 CAPSULE, DELAYED RELEASE ORAL DAILY
Qty: 90 CAPSULE | Refills: 3 | Status: SHIPPED | OUTPATIENT
Start: 2019-08-23 | End: 2020-03-04

## 2019-08-23 RX ORDER — PANTOPRAZOLE SODIUM 40 MG/1
40 TABLET, DELAYED RELEASE ORAL
Qty: 30 TABLET | Refills: 5 | Status: SHIPPED | OUTPATIENT
Start: 2019-08-23 | End: 2020-06-08

## 2019-08-23 NOTE — PATIENT INSTRUCTIONS
medicines you take. Where can you learn more? Go to [email or web address] and sign in to your Prim[employer] account. Enter H143 in the [employer] box to learn more about \"Neck Arthritis: Exercises. \"     If you do not have an account, please click on the \"Sign Up Now\" link. Current as of: September 20, 2018  Content Version: 12.1  © 5708-9752 [employer]. Care instructions adapted under license by [employer]. If you have questions about a medical condition or this instruction, always ask your healthcare professional. [place] [address] any warranty or liability for your use of this information. Patient Education        Healthy Upper Back: Exercises  Introduction  Here are some examples of exercises for your upper back. Start each exercise slowly. Ease off the exercise if you start to have pain. Your doctor or physical therapist will tell you when you can start these exercises and which ones will work best for you. How to do the exercises  Lower neck and upper back stretch    1. Stretch your arms out in front of your body. Clasp one hand on top of your other hand. 2. Gently reach out so that you feel your shoulder blades stretching away from each other. 3. Gently bend your head forward. 4. Hold for 15 to 30 seconds. 5. Repeat 2 to 4 times. Midback stretch    1. Kneel on the floor, and sit back on your ankles. 2. Lean forward, place your hands on the floor, and stretch your arms out in front of you. Rest your head between your arms. 3. Gently push your chest toward the floor, reaching as far in front of you as possible. 4. Hold for 15 to 30 seconds. 5. Repeat 2 to 4 times. Shoulder rolls    1. Sit comfortably with your feet shoulder-width apart. You can also do this exercise while standing. 2. Roll your shoulders up, then back, and then down in a smooth, circular motion. 3. Repeat 2 to 4 times. Wall push-up    1.  Stand against a wall with your feet about 12 to 24 inches back from the wall. If you feel any pain when you do this exercise, stand closer to the wall. 2. Place your hands on the wall slightly wider apart than your shoulders, and lean forward. 3. Gently lean your body toward the wall. Then push back to your starting position. Keep the motion smooth and controlled. 4. Repeat 8 to 12 times. Resisted shoulder blade squeeze    1. Sit or stand, holding the band in both hands in front of you. Keep your elbows close to your sides, bent at a 90-degree angle. Your palms should face up. 2. Squeeze your shoulder blades together, and move your arms to the outside, stretching the band. Be sure to keep your elbows at your sides while you do this. 3. Relax. 4. Repeat 8 to 12 times. Resisted rows    1. Put the band around a solid object, such as a bedpost, at about waist level. Hold one end of the band in each hand. 2. With your elbows at your sides and bent to 90 degrees, pull the band back to move your shoulder blades toward each other. Return to the starting position. 3. Repeat 8 to 12 times. Follow-up care is a key part of your treatment and safety. Be sure to make and go to all appointments, and call your doctor if you are having problems. It's also a good idea to know your test results and keep a list of the medicines you take. Where can you learn more? Go to [email or web address]://[email or web address].[email or web address] org and sign in to your [employer] account. Enter X667 in the [employer] box to learn more about \"Healthy Upper Back: Exercises. \"     If you do not have an account, please click on the \"Sign Up Now\" link. Current as of: September 20, 2018  Content Version: 12.1  © 8290-1712 Healthwise[employer] Incorporated. Care instructions adapted under license by [facility] ([facility]).  If you have questions about a medical condition or this instruction, always ask your healthcare professional. Atul Mckay disclaims any warranty or

## 2019-08-23 NOTE — PROGRESS NOTES
Constitutional: She appears well-developed and well-nourished. She is cooperative. Non-toxic appearance. HENT:   Head: Normocephalic. Right Ear: Tympanic membrane, external ear and ear canal normal.   Left Ear: Tympanic membrane, external ear and ear canal normal.   Nose: Nose normal.   Mouth/Throat: Oropharynx is clear and moist and mucous membranes are normal.   Neck: Carotid bruit is not present. No thyroid mass and no thyromegaly present. Cardiovascular: Normal rate, regular rhythm, normal heart sounds and intact distal pulses. No murmur heard. Pulses:       Dorsalis pedis pulses are 2+ on the right side, and 2+ on the left side. No LE edema   Pulmonary/Chest: Effort normal and breath sounds normal.   Abdominal: Soft. Bowel sounds are normal. There is tenderness in the epigastric area. There is no rigidity, no rebound, no guarding and no CVA tenderness. Musculoskeletal:        Cervical back: She exhibits tenderness and spasm (upper traps). She exhibits normal range of motion. Lymphadenopathy:     She has no cervical adenopathy. Neurological: She is alert. ASSESSMENT/PLAN:    Problem List Items Addressed This Visit     Chronic depression - Primary     Doing ok with Trazodone for sleep and Cymbalta 90 mg qd. Relevant Medications    DULoxetine (CYMBALTA) 30 MG extended release capsule    Epigastric pain     Discontinue Celebrex. Continue Protonix. Refer to GI. Relevant Medications    pantoprazole (PROTONIX) 40 MG tablet    Other Relevant Orders    HIRAM - Altaf Bowie MD, Gastroenterology, [facility]    Fibromyalgia     Discontinue Celebrex d/t epigastric pain. Continue Cymbalta 90 mg qd. Relevant Medications    DULoxetine (CYMBALTA) 30 MG extended release capsule    Irritable bowel syndrome with constipation     Primarily constipation related. Referred to GI.           Relevant Medications    pantoprazole (PROTONIX) 40 MG tablet    Other Relevant Orders    AFL Nicholas Lopez MD, Gastroenterology, [facility]    Neck pain     Would hold off on MRI until after trial PT ONLY IF PT is not effective in symptom relief. Has also recently ordered a head set for work which should help. Relevant Orders    [facility]          Current Outpatient Medications   Medication Sig Dispense Refill    DULoxetine (CYMBALTA) 30 MG extended release capsule Take 1 capsule by mouth daily 90 capsule 3    pantoprazole (PROTONIX) 40 MG tablet Take 1 tablet by mouth every morning (before breakfast) 30 tablet 5    gabapentin (NEURONTIN) 300 MG capsule Take 1 capsule by mouth 3 times daily for 180 days. Intended supply: 90 days 270 capsule 0    predniSONE (DELTASONE) 10 MG tablet Take 2 tablets by mouth daily for 10 days, THEN 1 tablet daily for 10 days, THEN 0.5 tablets daily for 10 days. 35 tablet 1    Cholecalciferol (VITAMIN D3) 2000 units CAPS Take by mouth      vitamin C (ASCORBIC ACID) 500 MG tablet Take 500 mg by mouth daily      Collagenase POWD by Does not apply route      DULoxetine (CYMBALTA) 60 MG extended release capsule Take 1 capsule by mouth daily 90 capsule 3    traZODone (DESYREL) 50 MG tablet Take 1 tablet by mouth nightly 90 tablet 3    metoprolol tartrate (LOPRESSOR) 25 MG tablet Take 1 tablet by mouth 2 times daily 180 tablet 3     No current facility-administered medications for this visit. No follow-ups on file.

## 2019-08-31 ASSESSMENT — ENCOUNTER SYMPTOMS
CHEST TIGHTNESS: 0
VOMITING: 0
ABDOMINAL PAIN: 1
DIARRHEA: 0
SHORTNESS OF BREATH: 0
NAUSEA: 0
CONSTIPATION: 1
BLOOD IN STOOL: 0

## 2019-09-03 ENCOUNTER — TELEPHONE (OUTPATIENT)
Dept: INTERNAL MEDICINE CLINIC | Age: 54
End: 2019-09-03

## 2019-09-03 ENCOUNTER — HOSPITAL ENCOUNTER (OUTPATIENT)
Dept: MRI IMAGING | Age: 54
Discharge: HOME | End: 2019-09-03
Payer: COMMERCIAL

## 2019-09-03 DIAGNOSIS — L40.50 PSORIATIC ARTHRITIS (HCC): ICD-10-CM

## 2019-09-03 PROCEDURE — 73218 MRI UPPER EXTREMITY W/O DYE: CPT

## 2019-09-03 PROCEDURE — 72195 MRI PELVIS W/O DYE: CPT

## 2019-09-03 NOTE — RESULT ENCOUNTER NOTE
MRI studies did not show any inflammation. She should try Prednisone taper and Celebrex and and f/u w/ me at next appt.

## 2019-09-11 ENCOUNTER — TELEPHONE (OUTPATIENT)
Dept: RHEUMATOLOGY | Age: 54
End: 2019-09-11

## 2019-09-17 NOTE — PROGRESS NOTES
or heat intolerance  Allergic/Immunologic: denies nasal congestion, chronic asthma, or hives    Allergies   Allergen Reactions    Morphine Other (See Comments)     Chest tightness       Past Medical History:        Diagnosis Date    Chronic depression     DDD (degenerative disc disease), lumbar     Fibromyalgia     HTN (hypertension)     KARTHIK (obstructive sleep apnea)     Psoriatic arthritis ([facility] [address]        Past Surgical History:        Procedure Laterality Date    BLADDER SUSPENSION  2004     SECTION  08/31/1987    x1    CHOLECYSTECTOMY  2000    ENDOMETRIAL ABLATION      HYSTERECTOMY, TOTAL ABDOMINAL  2009    heavy menses    NASAL SEPTUM SURGERY      TURBINATE RESECTION         Medications:    Current Outpatient Medications   Medication Sig Dispense Refill    DULoxetine (CYMBALTA) 30 MG extended release capsule Take 1 capsule by mouth daily 90 capsule 3    pantoprazole (PROTONIX) 40 MG tablet Take 1 tablet by mouth every morning (before breakfast) 30 tablet 5    gabapentin (NEURONTIN) 300 MG capsule Take 1 capsule by mouth 3 times daily for 180 days. Intended supply: 90 days 270 capsule 0    Cholecalciferol (VITAMIN D3) 2000 units CAPS Take by mouth      vitamin C (ASCORBIC ACID) 500 MG tablet Take 500 mg by mouth daily      Collagenase POWD by Does not apply route      DULoxetine (CYMBALTA) 60 MG extended release capsule Take 1 capsule by mouth daily 90 capsule 3    traZODone (DESYREL) 50 MG tablet Take 1 tablet by mouth nightly 90 tablet 3    metoprolol tartrate (LOPRESSOR) 25 MG tablet Take 1 tablet by mouth 2 times daily 180 tablet 3     No current facility-administered medications for this visit.          OBJECTIVE:  Physical Exam:    /72 (Site: Right Upper Arm, Position: Sitting, Cuff Size: Medium Adult)   Pulse 63   Wt 175 lb (79.4 kg)   BMI 29.12 kg/m²     GEN: AAOx3, in NAD, well-appearing  HEAD: normocephalic, atraumatic  EYES: EOMI, PERRLA, no injection or icterus  NOSE: no nasal ulcers or nasal drainage  ORAL CAVITY: moist oral mucosa w/ good saliva pooling, no oral lesions, no evidence of thrush, no evidence of parotid gland enlargement  CVS: RRR, no murmurs rubs or gallops  LUNGS: in no acute respiratory distress, CTAB  ABDOMEN: soft, no guarding or rebound tenderness  MSK: diffuse myofascial tenderness improved since previous  Spine: no kyphosis or lordosis, lumbar spine and paraspinal tenderness, R SI joint TTP  Upper extremities:              Hands: no active synovitis or dactylitis, L 2nd PIP and DIP and 3rd PIP joints slightly TTP, able to make strong full fists              Wrist: no synovitis in the wrist joints b/l, FROM              Elbow: no synovitis or bursitis, b/l lateral epicondyles slightly TTP, FROM              Shoulders: no pain or swelling or warmth on palpation, FROM  Lower extremities:              Hip: normal log roll, negative TAMARA test b/l, trochanteric bursa NTTP b/l              Knees: no warmth or effusion present, FROM              Ankles: no synovitis, FROM, Achilles tendons w/o swelling or warmth NTTP              Feet: no toe swelling or pain or warmth on palpation w/ FROM, negative MTP squeeze test b/l  INTEGUMENTARY: no active psoriatic lesions, no nail pitting although she has her toenails painted, no patchy alopecia, no other rash, petechiae, bruises, or palpable purpura, no clubbing or digital ulcers  PSYCH: tearful during discussion    DATA:  Labs:    I personally reviewed interval labs and discussed w/ the pt in detail which showed:  Lab Results   Component Value Date    WBC 3.5 (L) 07/10/2019    HGB 13.7 07/10/2019    HCT 40.8 07/10/2019    MCV 93.8 07/10/2019     07/10/2019    LYMPHOPCT 38.2 07/10/2019    RBC 4.35 07/10/2019    MCH 31.6 07/10/2019    MCHC 33.7 07/10/2019    RDW 13.8 07/10/2019       Lab Results   Component Value Date     07/10/2019    K 4.8 07/10/2019     07/10/2019    CO2 25 07/10/2019 juxta-articular osteopenia, no erosive changes seen. Knees w/ mild medial joint space narrowing, no chondrocalcinosis. SI joints patent w/o erosive changes, some sclerosis. MRI R hand (9/3/19)  No erosive changes or synovitis. MRI pelvis (9/3/19): No evidence of active sacroiliitis. Above results were discussed w/ the pt in detail during today's visit. ASSESSMENT/PLAN:   Reviewed prior notes from former rheumatologist, Dr. Fuad Cornell from 1/19/17, 3/17 and 6/29/17 which confirm Dx of PsA. It appears that she does not have active disease (skin or joints) currently based on recent laboratory results, MRI studies and physical exam findings. No indication to resume currently. Ordered inflammatory markers to be drawn during future flare ups. We discussed the option of resuming low dose  mg BID given her prior Hx of thrush on this. Her fibromyalgia is improved on Gabapentin and Cymbalta. Consider Flexeril if indicated. Diagnoses and all orders for this visit:    Psoriasis  -     C-Reactive Protein; Future  -     Sedimentation Rate; Future    Psoriatic arthritis (HCC)  -     C-Reactive Protein; Future  -     Sedimentation Rate; Future    Fibromyalgia          Orders Placed This Encounter   Procedures    C-Reactive Protein     Standing Status:   Future     Standing Expiration Date:   9/18/2020    Sedimentation Rate     Standing Status:   Future     Standing Expiration Date:   9/18/2020     Outpatient Encounter Medications as of 9/18/2019   Medication Sig Dispense Refill    DULoxetine (CYMBALTA) 30 MG extended release capsule Take 1 capsule by mouth daily 90 capsule 3    pantoprazole (PROTONIX) 40 MG tablet Take 1 tablet by mouth every morning (before breakfast) 30 tablet 5    gabapentin (NEURONTIN) 300 MG capsule Take 1 capsule by mouth 3 times daily for 180 days.  Intended supply: 90 days 270 capsule 0    Cholecalciferol (VITAMIN D3) 2000 units CAPS Take by mouth      vitamin C

## 2019-09-18 ENCOUNTER — OFFICE VISIT (OUTPATIENT)
Dept: RHEUMATOLOGY | Age: 54
End: 2019-09-18
Payer: COMMERCIAL

## 2019-09-18 VITALS
SYSTOLIC BLOOD PRESSURE: 108 MMHG | BODY MASS INDEX: 29.12 KG/M2 | DIASTOLIC BLOOD PRESSURE: 72 MMHG | WEIGHT: 175 LBS | HEART RATE: 63 BPM

## 2019-09-18 DIAGNOSIS — L40.50 PSORIATIC ARTHRITIS (HCC): ICD-10-CM

## 2019-09-18 DIAGNOSIS — M79.7 FIBROMYALGIA: ICD-10-CM

## 2019-09-18 DIAGNOSIS — L40.9 PSORIASIS: Primary | ICD-10-CM

## 2019-09-18 PROCEDURE — 99214 OFFICE O/P EST MOD 30 MIN: CPT | Performed by: INTERNAL MEDICINE

## 2019-10-25 ENCOUNTER — OFFICE VISIT (OUTPATIENT)
Dept: ORTHOPEDIC SURGERY | Age: 54
End: 2019-10-25
Payer: COMMERCIAL

## 2019-10-25 VITALS — BODY MASS INDEX: 29.16 KG/M2 | WEIGHT: 175.04 LBS | RESPIRATION RATE: 16 BRPM | HEIGHT: 65 IN | HEART RATE: 78 BPM

## 2019-10-25 DIAGNOSIS — M47.816 SPONDYLOSIS WITHOUT MYELOPATHY OR RADICULOPATHY, LUMBAR REGION: ICD-10-CM

## 2019-10-25 DIAGNOSIS — M47.816 FACET HYPERTROPHY OF LUMBAR REGION: Primary | ICD-10-CM

## 2019-10-25 DIAGNOSIS — M51.36 DDD (DEGENERATIVE DISC DISEASE), LUMBAR: ICD-10-CM

## 2019-10-25 PROCEDURE — 99214 OFFICE O/P EST MOD 30 MIN: CPT | Performed by: PHYSICIAN ASSISTANT

## 2019-10-25 RX ORDER — AMOXICILLIN 500 MG/1
500 CAPSULE ORAL 3 TIMES DAILY
COMMUNITY
Start: 2019-10-21 | End: 2019-10-30

## 2019-10-29 DIAGNOSIS — M51.36 DDD (DEGENERATIVE DISC DISEASE), LUMBAR: ICD-10-CM

## 2019-10-29 RX ORDER — TRAMADOL HYDROCHLORIDE 50 MG/1
TABLET ORAL
Qty: 30 TABLET | Refills: 0 | Status: SHIPPED | OUTPATIENT
Start: 2019-10-29 | End: 2019-11-25

## 2019-11-01 ENCOUNTER — TELEPHONE (OUTPATIENT)
Dept: ORTHOPEDIC SURGERY | Age: 54
End: 2019-11-01

## 2019-11-06 ENCOUNTER — APPOINTMENT (OUTPATIENT)
Dept: GENERAL RADIOLOGY | Age: 54
End: 2019-11-06
Attending: PHYSICAL MEDICINE & REHABILITATION
Payer: COMMERCIAL

## 2019-11-06 ENCOUNTER — HOSPITAL ENCOUNTER (OUTPATIENT)
Age: 54
Setting detail: OUTPATIENT SURGERY
Discharge: HOME | End: 2019-11-06
Attending: PHYSICAL MEDICINE & REHABILITATION | Admitting: PHYSICAL MEDICINE & REHABILITATION
Payer: COMMERCIAL

## 2019-11-06 VITALS
DIASTOLIC BLOOD PRESSURE: 90 MMHG | TEMPERATURE: 98 F | HEIGHT: 65 IN | WEIGHT: 170 LBS | OXYGEN SATURATION: 97 % | SYSTOLIC BLOOD PRESSURE: 118 MMHG | RESPIRATION RATE: 16 BRPM | BODY MASS INDEX: 28.32 KG/M2 | HEART RATE: 64 BPM

## 2019-11-06 PROCEDURE — 2500000003 HC RX 250 WO HCPCS: Performed by: PHYSICAL MEDICINE & REHABILITATION

## 2019-11-06 PROCEDURE — 3610000056 HC PAIN LEVEL 4 BASE (NON-OR): Performed by: PHYSICAL MEDICINE & REHABILITATION

## 2019-11-06 PROCEDURE — 6360000002 HC RX W HCPCS: Performed by: PHYSICAL MEDICINE & REHABILITATION

## 2019-11-06 PROCEDURE — 3610000056 HC PAIN LEVEL 4 BASE (NON-OR)

## 2019-11-06 PROCEDURE — 2709999900 HC NON-CHARGEABLE SUPPLY: Performed by: PHYSICAL MEDICINE & REHABILITATION

## 2019-11-06 PROCEDURE — 99152 MOD SED SAME PHYS/QHP 5/>YRS: CPT | Performed by: PHYSICAL MEDICINE & REHABILITATION

## 2019-11-06 PROCEDURE — 3209999900 FLUORO FOR SURGICAL PROCEDURES

## 2019-11-06 RX ORDER — BETAMETHASONE SODIUM PHOSPHATE AND BETAMETHASONE ACETATE 3; 3 MG/ML; MG/ML
INJECTION, SUSPENSION INTRA-ARTICULAR; INTRALESIONAL; INTRAMUSCULAR; SOFT TISSUE
Status: COMPLETED | OUTPATIENT
Start: 2019-11-06 | End: 2019-11-06

## 2019-11-06 RX ORDER — BUPIVACAINE HYDROCHLORIDE 2.5 MG/ML
INJECTION, SOLUTION INFILTRATION; PERINEURAL
Status: COMPLETED | OUTPATIENT
Start: 2019-11-06 | End: 2019-11-06

## 2019-11-06 RX ORDER — MIDAZOLAM HYDROCHLORIDE 1 MG/ML
INJECTION INTRAMUSCULAR; INTRAVENOUS
Status: COMPLETED | OUTPATIENT
Start: 2019-11-06 | End: 2019-11-06

## 2019-11-06 RX ORDER — LIDOCAINE HYDROCHLORIDE 10 MG/ML
INJECTION, SOLUTION EPIDURAL; INFILTRATION; INTRACAUDAL; PERINEURAL
Status: COMPLETED | OUTPATIENT
Start: 2019-11-06 | End: 2019-11-06

## 2019-11-06 ASSESSMENT — PAIN - FUNCTIONAL ASSESSMENT: PAIN_FUNCTIONAL_ASSESSMENT: 0-10

## 2019-11-06 ASSESSMENT — PAIN SCALES - GENERAL: PAINLEVEL_OUTOF10: 0

## 2019-11-25 ENCOUNTER — OFFICE VISIT (OUTPATIENT)
Dept: ORTHOPEDIC SURGERY | Age: 54
End: 2019-11-25
Payer: COMMERCIAL

## 2019-11-25 VITALS
WEIGHT: 169.97 LBS | HEIGHT: 65 IN | DIASTOLIC BLOOD PRESSURE: 92 MMHG | HEART RATE: 64 BPM | SYSTOLIC BLOOD PRESSURE: 137 MMHG | BODY MASS INDEX: 28.32 KG/M2

## 2019-11-25 DIAGNOSIS — M47.812 SPONDYLOSIS WITHOUT MYELOPATHY OR RADICULOPATHY, CERVICAL REGION: ICD-10-CM

## 2019-11-25 DIAGNOSIS — M79.18 MYOFASCIAL PAIN: ICD-10-CM

## 2019-11-25 DIAGNOSIS — M47.816 SPONDYLOSIS WITHOUT MYELOPATHY OR RADICULOPATHY, LUMBAR REGION: ICD-10-CM

## 2019-11-25 DIAGNOSIS — M51.36 DDD (DEGENERATIVE DISC DISEASE), LUMBAR: ICD-10-CM

## 2019-11-25 DIAGNOSIS — M47.816 FACET HYPERTROPHY OF LUMBAR REGION: Primary | ICD-10-CM

## 2019-11-25 PROCEDURE — 20552 NJX 1/MLT TRIGGER POINT 1/2: CPT | Performed by: PHYSICIAN ASSISTANT

## 2019-11-25 PROCEDURE — 99214 OFFICE O/P EST MOD 30 MIN: CPT | Performed by: PHYSICIAN ASSISTANT

## 2019-11-26 ENCOUNTER — TELEPHONE (OUTPATIENT)
Dept: INTERNAL MEDICINE CLINIC | Age: 54
End: 2019-11-26

## 2019-11-27 RX ORDER — METOPROLOL TARTRATE 50 MG/1
50 TABLET, FILM COATED ORAL 2 TIMES DAILY
Qty: 60 TABLET | Refills: 1 | Status: SHIPPED | OUTPATIENT
Start: 2019-11-27 | End: 2020-01-30

## 2019-12-11 ENCOUNTER — OFFICE VISIT (OUTPATIENT)
Dept: INTERNAL MEDICINE CLINIC | Age: 54
End: 2019-12-11
Payer: COMMERCIAL

## 2019-12-11 VITALS
HEART RATE: 60 BPM | DIASTOLIC BLOOD PRESSURE: 80 MMHG | WEIGHT: 175 LBS | BODY MASS INDEX: 29.12 KG/M2 | SYSTOLIC BLOOD PRESSURE: 120 MMHG | OXYGEN SATURATION: 99 %

## 2019-12-11 DIAGNOSIS — F32.A CHRONIC DEPRESSION: ICD-10-CM

## 2019-12-11 DIAGNOSIS — E78.5 MILD HYPERLIPIDEMIA: ICD-10-CM

## 2019-12-11 DIAGNOSIS — R73.03 PRE-DIABETES: ICD-10-CM

## 2019-12-11 DIAGNOSIS — J02.0 PHARYNGITIS DUE TO STREPTOCOCCUS SPECIES: ICD-10-CM

## 2019-12-11 DIAGNOSIS — E55.9 VITAMIN D INSUFFICIENCY: ICD-10-CM

## 2019-12-11 DIAGNOSIS — M79.7 FIBROMYALGIA: ICD-10-CM

## 2019-12-11 DIAGNOSIS — I10 ESSENTIAL HYPERTENSION: Primary | ICD-10-CM

## 2019-12-11 PROCEDURE — 99214 OFFICE O/P EST MOD 30 MIN: CPT | Performed by: INTERNAL MEDICINE

## 2019-12-11 RX ORDER — AMOXICILLIN 875 MG/1
875 TABLET, COATED ORAL 2 TIMES DAILY
Qty: 20 TABLET | Refills: 0 | Status: SHIPPED | OUTPATIENT
Start: 2019-12-11 | End: 2019-12-21

## 2019-12-11 ASSESSMENT — ENCOUNTER SYMPTOMS
DIARRHEA: 0
SHORTNESS OF BREATH: 0
TROUBLE SWALLOWING: 1
SINUS PRESSURE: 0
CHEST TIGHTNESS: 0
RHINORRHEA: 0
SORE THROAT: 1
CONSTIPATION: 0

## 2020-01-20 ENCOUNTER — OFFICE VISIT (OUTPATIENT)
Dept: ORTHOPEDIC SURGERY | Age: 55
End: 2020-01-20
Payer: COMMERCIAL

## 2020-01-20 VITALS
SYSTOLIC BLOOD PRESSURE: 117 MMHG | WEIGHT: 175.04 LBS | DIASTOLIC BLOOD PRESSURE: 77 MMHG | BODY MASS INDEX: 29.16 KG/M2 | HEART RATE: 54 BPM | HEIGHT: 65 IN

## 2020-01-20 PROCEDURE — 99213 OFFICE O/P EST LOW 20 MIN: CPT | Performed by: PHYSICIAN ASSISTANT

## 2020-01-20 PROCEDURE — 20552 NJX 1/MLT TRIGGER POINT 1/2: CPT | Performed by: PHYSICIAN ASSISTANT

## 2020-01-20 NOTE — PROGRESS NOTES
1/20/20 9:17 AM         NDC: 2588-6055-76   [facility]    Lot Number: [id number]       Comments: LEFT UPPER TRAP TPI        NDC: 6313-0591-49   -   LIDOCAINE 1%    Lot Number: [id number]       Comments: LEFT UPPER TRAP TPI

## 2020-01-20 NOTE — PROGRESS NOTES
no   Difficulty walking:  no              Past Medical History:   Past Medical History:   Diagnosis Date    Chronic depression     DDD (degenerative disc disease), lumbar     Fibromyalgia     HTN (hypertension)     KARTHIK (obstructive sleep apnea)     Psoriatic arthritis ([facility] [address]       Past Surgical History:     Past Surgical History:   Procedure Laterality Date    BLADDER SUSPENSION  2004     SECTION  08/31/1987    x1    CHOLECYSTECTOMY  2000    ENDOMETRIAL ABLATION  2007    HYSTERECTOMY, TOTAL ABDOMINAL  2009    heavy menses    NASAL SEPTUM SURGERY  2005    NERVE SURGERY Bilateral 2019    BILATERAL L4-5 AND L5-S1 LUMBAR FACET INJECTIONS WITH FLUOROSCOPY performed by Yue Junior MD at [place] [address]       Current Medications:     Current Outpatient Medications:     metoprolol tartrate (LOPRESSOR) 50 MG tablet, Take 1 tablet by mouth 2 times daily, Disp: 60 tablet, Rfl: 1    DULoxetine (CYMBALTA) 30 MG extended release capsule, Take 1 capsule by mouth daily, Disp: 90 capsule, Rfl: 3    pantoprazole (PROTONIX) 40 MG tablet, Take 1 tablet by mouth every morning (before breakfast), Disp: 30 tablet, Rfl: 5    Cholecalciferol (VITAMIN D3) 2000 units CAPS, Take by mouth, Disp: , Rfl:     vitamin C (ASCORBIC ACID) 500 MG tablet, Take 500 mg by mouth daily, Disp: , Rfl:     Collagenase POWD, by Does not apply route, Disp: , Rfl:     DULoxetine (CYMBALTA) 60 MG extended release capsule, Take 1 capsule by mouth daily, Disp: 90 capsule, Rfl: 3    traZODone (DESYREL) 50 MG tablet, Take 1 tablet by mouth nightly, Disp: 90 tablet, Rfl: 3  Allergies:  Morphine  Social History:    reports that she has never smoked. She has never used smokeless tobacco. She reports that she does not use drugs.   Family History:   Family History   Problem Relation Age of Onset    Cancer Mother         gallbladder cancer    High Blood Pressure Mother     Alcohol Abuse Mother     High Blood Pressure Father     Cancer Sister         small cell lung cancer (smoker)    Heart Disease Maternal Grandmother     Heart Disease Maternal Grandfather     Stroke Maternal Grandfather     Heart Disease Paternal Grandmother     Heart Disease Paternal Grandfather     No Known Problems Half-Brother     No Known Problems Half-Sister     Other Half-Sibling         skin cancer    Other Half-Sister         blood clots    Substance Abuse Daughter 32        in remission        REVIEW OF SYSTEMS:   CONSTITUTIONAL: Denies unexplained weight loss, fevers, chills or fatigue  NEUROLOGICAL: Denies unsteady gait or progressive weakness  MUSCULOSKELETAL: Denies joint swelling or redness  GI: Denies nausea, vomiting, diarrhea   : Denies bowel or bladder issues       PHYSICAL EXAM:    Vitals: Blood pressure 117/77, pulse 54, height 5' 5\" (1.651 m), weight 175 lb 0.7 oz (79.4 kg). GENERAL EXAM:  · General Apparence: Patient is adequately groomed with no evidence of malnutrition. · Psychiatric: Orientation: The patient is oriented to time, place and person. The patient's mood and affect are appropriate   · Vascular: Examination reveals no swelling and palpation reveals no tenderness in upper or lower extremities. Good capillary refill. · The lymphatic examination of the neck, axillae and groin reveals all areas to be without enlargement or induration  · Sensation is intact without deficit in the upper and lower extremities to light touch and pinprick  · Coordination of the upper and lower extremities are normal.    CERVICAL EXAMINATION:  · Inspection: Local inspection shows no step-off or bruising. Cervical alignment is normal. No instability is noted. · Palpation and Percussion: No evidence of tenderness at the midline. Paraspinal tenderness is present with left-sided upper trapezius tenderness. There is no paraspinal spasm.    Skin:There are no rashes, ulcerations or lesions  · Range of Motion:  limited by 25% in treatment options were outlined and discussed with Lily Martins including:  Conservative care options: physical therapy, ice, medications, bracing, and activity modification. The indications for therapeutic injections. The indications for additional imaging/laboratory studies. The indications for (possible future) interventions. After considering the various options discussed, Lily Martins elected to pursue a course of treatment that includes the followin. Medications:  No further recommendations for new medications. 2. PT:  Encouraged to continue with HEP. 3. Further studies:  No further studies. 4. Interventional: A left upper trapezius trigger point injection was discussed with the patient in the office today. Side effects, risks, alternatives, and benefits were discussed the patient was willing and ready to proceed. The patient was placed in a seated position with the left upper trapezius muscle and full view. The site of maximal tenderness was found and marked and then prepped with ChloraPrep x3. Using a 22-gauge needle, 40 mg of Kenalog and 2 mL of lidocaine were instilled at that site of maximum tenderness. The needle was removed and a Band-Aid was then applied. This was completed with optimal relief. 5. Follow up:  4-6 weeks      Lily Martins was instructed to call the office if her symptoms worsen or if new symptoms appear prior to the next scheduled visit. She is specifically instructed to contact the office between now & her scheduled appointment if she has concerns related to her condition or if she needs assistance in scheduling the above tests. She is welcome to call for an appointment sooner if she has any additional concerns or questions. Mely ZUNIGA ATC, am scribing for and in the presence of Ward Fernando. [place], [place].  20 9:04 AM Mely Leslie.       The physical examination was performed between the patient and Ward Fernando.

## 2020-01-30 RX ORDER — METOPROLOL TARTRATE 50 MG/1
TABLET, FILM COATED ORAL
Qty: 60 TABLET | Refills: 0 | Status: SHIPPED | OUTPATIENT
Start: 2020-01-30 | End: 2020-03-02

## 2020-03-02 RX ORDER — METOPROLOL TARTRATE 50 MG/1
TABLET, FILM COATED ORAL
Qty: 60 TABLET | Refills: 0 | Status: SHIPPED | OUTPATIENT
Start: 2020-03-02 | End: 2020-03-30

## 2020-03-02 NOTE — PROGRESS NOTES
Keturah Villegas MD  [facility]) Physicians - Rheumatology    [x] [facility]:  [address]  [place], [address] [] [facility] [place] Office:  [address], [address] [facility]  THE [facility] AT [place], [place]   Office: [phone]  Fax: [phone]     Rheumatology Progress Note    SUBJECTIVE:    Background:   Chuck Leyva is a 54 y.o. female w/ prior Dx of long standing PsO, PsA and fibromyalgia previously followed by rheumatologist (Dr. Nella Blanton) in [facility]. PMHx pertinent for chronic back pain from DDD, spondylosis and facet arthropathy of L-spine (follows w/ Dr. Preeti Xavier), KARTHIK noncompliant w/ CPAP, HTN, pre-diabetes, fatty liver, IBS, depression and anxiety. Per prior rheumatologist, Dr. Carroll Bond note from 1/19/17: Dx of PsA on Humira \"was doing well initially on Humira, still flares, no synovitis\", Humira was switched to Stelara in 3/17. PsA was noted to improve on Stelara per rheumatologist note from 6/29/17. Reviewed  Dermatology note from 9/17/18, Dx of PsO w/ PsA \"previously on many biologics per Rheumatology\", exam at the time revealed \"palms w/ few pink thin scaling plaques\". Current rheum meds:  Cymbalta 60 mg daily    Prior rheum meds:  Meloxicam provided short term pain relief but worsened her GERD, she thinks she took Celecoxib for a couple of yrs in her 30s  Celecoxib 100 mg BID: caused GERD  Medrol dose pack: significantly improves joint pain  MTX: per pt oral MTX caused thrush, she thinks this \"worked a little bit\"  SSZ: per pt caused thrush  Humira: per pt this was [employer]" but it stopped working after a few months  Stelara: per pt this worked mainly for her joints - she felt this worked but stopped after two months d/t insurance issues   Flexeril: ineffective for back pain  Gabapentin: does not remember response  Paxil, Effexor    Past medical/surgical history, medications and allergies are reviewed and updated as appropriate.     Interval Hx:   Pt reports persistent arthralgias involving her hands.  Joint pain is worst int he morning. She reports morning stiffness in her hands that improves throughout the morning. Stiffness lasts an hr. She denies any joint swelling. Pt reports significant pain relief in her back from facet injections. Since her last visit, pt states she developed TMJ kaz on her L side, she has an appt w/ a \"TMJ specialist, dentist\" at the end of the month. She reports a 2-3 month Hx of temporal headache. Headache usually occurs in the afternoon. Denies scalp pain, jaw or tongue claudication pain. Denies vision change. Denies Hx of shoulder or hip girdle discomfort. Pt states her dentist started on her a Medrol dose pack last month for her TMJ which significantly improved her TMJ pain as well as her arthralgias. Skin remains clear. She reports new onset dry mouth despite drinking adequate amounts of water. Mouth feels sour at times. She's been using OTC Biotene products (mouth spray and mouth wash). Denies Hx of vaginal dryness prior to menopause. She reports worsening GERD despite being on Protonix 40 mg daily. She has not noticed any oral ulcers. Eyes burn and feel itchy at the end of the day. Pt states she was previously unable to wear contact lenses d/t dry eyes. 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     Rheumatologic ROS:  Constitutional: denies chronic fatigue, fever/chills, night sweats, unintentional weight loss  Integumentary: +chronic diffuse hair thinning, denies photosensitivity, rash, or Sx of Raynaud's phenomenon  Eyes: +dry eyes lately she feels \"they're irritating\" has not had any recent eye exam, denies redness or pain, visual disturbance, or floaters  Ears: denies hearing loss, tinnitus, vertigo, or recurrent ear infections  Nose: denies nasal ulcers or recurrent sinusitis  Oral cavity: +dry mouth, denies oral ulcers  Cardiovascular: denies CP, palpitations, Hx of pericardial effusion or pericarditis  Respiratory: denies SOB, cough, nasal drainage  ORAL CAVITY: dry oral mucosa w/ reduced saliva pooling, no oral lesions, no evidence of thrush, no evidence of parotid gland enlargement  CVS: RRR, no murmurs rubs or gallops  LUNGS: in no acute respiratory distress, CTAB  MSK: diffuse myofascial tenderness improved since previous  Spine: no kyphosis or lordosis, lumbar spine and paraspinal tenderness, R SI joint TTP  Upper extremities:              Hands: no active synovitis or dactylitis, R PIP and L DIP joints TTP, L 2nd MCP joint w/o swelling TTP, able to make strong full fists              Wrist: no synovitis in the wrist joints b/l, FROM              Elbow: no synovitis or bursitis, b/l lateral epicondyles slightly TTP, FROM              Shoulders: no pain or swelling or warmth on palpation, FROM  Lower extremities:              Knees: no warmth or effusion present, FROM              Ankles: no synovitis, FROM, Achilles tendons w/o swelling or warmth NTTP              Feet: no toe swelling or pain or warmth on palpation w/ FROM, negative MTP squeeze test b/l  INTEGUMENTARY: no active psoriatic lesions, no nail pitting although she has her toenails painted, no patchy alopecia, no other rash, petechiae, bruises, or palpable purpura, no clubbing or digital ulcers  PSYCH: tearful during discussion    DATA:  Labs:    I personally reviewed interval labs and discussed w/ the pt in detail which showed:  Lab Results   Component Value Date    WBC 3.5 (L) 07/10/2019    HGB 13.7 07/10/2019    HCT 40.8 07/10/2019    MCV 93.8 07/10/2019     07/10/2019    LYMPHOPCT 38.2 07/10/2019    RBC 4.35 07/10/2019    MCH 31.6 07/10/2019    MCHC 33.7 07/10/2019    RDW 13.8 07/10/2019       Lab Results   Component Value Date     07/10/2019    K 4.8 07/10/2019     07/10/2019    CO2 25 07/10/2019    BUN 20 07/10/2019    CREATININE 0.7 07/10/2019    GLUCOSE 112 (H) 07/10/2019    CALCIUM 9.5 07/10/2019    PROT 6.8 07/10/2019    LABALBU 4.5 07/10/2019

## 2020-03-04 ENCOUNTER — OFFICE VISIT (OUTPATIENT)
Dept: RHEUMATOLOGY | Age: 55
End: 2020-03-04
Payer: COMMERCIAL

## 2020-03-04 VITALS
WEIGHT: 178.2 LBS | HEART RATE: 80 BPM | SYSTOLIC BLOOD PRESSURE: 122 MMHG | BODY MASS INDEX: 29.65 KG/M2 | DIASTOLIC BLOOD PRESSURE: 84 MMHG

## 2020-03-04 PROCEDURE — 99214 OFFICE O/P EST MOD 30 MIN: CPT | Performed by: INTERNAL MEDICINE

## 2020-03-04 RX ORDER — DULOXETIN HYDROCHLORIDE 60 MG/1
60 CAPSULE, DELAYED RELEASE ORAL DAILY
Qty: 30 CAPSULE | Refills: 2 | Status: SHIPPED | OUTPATIENT
Start: 2020-03-04 | End: 2020-04-16 | Stop reason: SDUPTHER

## 2020-03-04 RX ORDER — SULFASALAZINE 500 MG/1
500 TABLET ORAL 2 TIMES DAILY
Qty: 60 TABLET | Refills: 1 | Status: SHIPPED | OUTPATIENT
Start: 2020-03-04 | End: 2020-04-16 | Stop reason: SDUPTHER

## 2020-03-09 ENCOUNTER — OFFICE VISIT (OUTPATIENT)
Dept: INTERNAL MEDICINE CLINIC | Age: 55
End: 2020-03-09
Payer: COMMERCIAL

## 2020-03-09 VITALS
HEART RATE: 76 BPM | WEIGHT: 179.4 LBS | BODY MASS INDEX: 29.85 KG/M2 | SYSTOLIC BLOOD PRESSURE: 132 MMHG | OXYGEN SATURATION: 96 % | DIASTOLIC BLOOD PRESSURE: 88 MMHG

## 2020-03-09 PROBLEM — H04.123 DRY EYES: Status: ACTIVE | Noted: 2020-03-09

## 2020-03-09 PROBLEM — G43.109 MIGRAINE WITH AURA AND WITHOUT STATUS MIGRAINOSUS, NOT INTRACTABLE: Status: ACTIVE | Noted: 2020-03-09

## 2020-03-09 PROBLEM — H53.8 BLURRY VISION: Status: ACTIVE | Noted: 2020-03-09

## 2020-03-09 PROBLEM — R68.2 DRY MOUTH: Status: ACTIVE | Noted: 2020-03-09

## 2020-03-09 PROCEDURE — 99214 OFFICE O/P EST MOD 30 MIN: CPT | Performed by: INTERNAL MEDICINE

## 2020-03-09 RX ORDER — ONDANSETRON 8 MG/1
8 TABLET, ORALLY DISINTEGRATING ORAL EVERY 8 HOURS PRN
Qty: 20 TABLET | Refills: 1 | Status: SHIPPED | OUTPATIENT
Start: 2020-03-09 | End: 2021-04-18 | Stop reason: ALTCHOICE

## 2020-03-09 RX ORDER — SUMATRIPTAN 100 MG/1
100 TABLET, FILM COATED ORAL
Qty: 9 TABLET | Refills: 1 | Status: SHIPPED | OUTPATIENT
Start: 2020-03-09 | End: 2021-09-26 | Stop reason: SDUPTHER

## 2020-03-09 RX ORDER — AMITRIPTYLINE HYDROCHLORIDE 10 MG/1
10 TABLET, FILM COATED ORAL NIGHTLY
Qty: 30 TABLET | Refills: 1 | Status: SHIPPED | OUTPATIENT
Start: 2020-03-09 | End: 2020-03-19

## 2020-03-09 ASSESSMENT — ENCOUNTER SYMPTOMS
NAUSEA: 1
CHEST TIGHTNESS: 0
CONSTIPATION: 0
DIARRHEA: 0
SHORTNESS OF BREATH: 0
PHOTOPHOBIA: 0

## 2020-03-09 NOTE — ASSESSMENT & PLAN NOTE
Encouraged to follow-up with ophthalmology and have her eyes rechecked. Discussed that this may be contributing to her headaches.

## 2020-03-09 NOTE — ASSESSMENT & PLAN NOTE
New onset with increasing frequency. She does have associated aura of smell of cigarette smoke prior to more severe headaches. Given recent onset, check MRI of head. Change trazodone to amitriptyline 10 mg nightly. This should help with her sleep and may help with migraines. Add Imitrex 100 mg as needed. Advised to take as soon as she realizes headaches going to be severe. Advised to take 400 mg of ibuprofen as soon as she starts to smell the cigarette smoke smell. I do suspect that there are some contributing factors to her headaches including her TMJ and possible vision changes as well. Recheck in 4 to 6 weeks.

## 2020-03-09 NOTE — PROGRESS NOTES
DDD (degenerative disc disease), lumbar     Fibromyalgia     HTN (hypertension)     KARTHIK (obstructive sleep apnea)     Psoriatic arthritis (HCC)       Past Surgical History:   Procedure Laterality Date    BLADDER SUSPENSION  2004     SECTION  08/31/1987    x1    CHOLECYSTECTOMY  2000    ENDOMETRIAL ABLATION  2007    HYSTERECTOMY, TOTAL ABDOMINAL  2009    heavy menses    NASAL SEPTUM SURGERY  2005    NERVE SURGERY Bilateral 2019    BILATERAL L4-5 AND L5-S1 LUMBAR FACET INJECTIONS WITH FLUOROSCOPY performed by Soila Brand MD at [place] [address]        Social History     Socioeconomic History    Marital status:      Spouse name: Not on file    Number of children: Not on file    Years of education: Not on file    Highest education level: Not on file   Occupational History    Not on file   Social Needs    Financial resource strain: Not on file    Food insecurity     Worry: Not on file     Inability: Not on file    Transportation needs     Medical: Not on file     Non-medical: Not on file   Tobacco Use    Smoking status: Never Smoker    Smokeless tobacco: Never Used   Substance and Sexual Activity    Alcohol use: Not on file     Comment: occasional     Drug use: Never    Sexual activity: Not on file   Lifestyle    Physical activity     Days per week: Not on file     Minutes per session: Not on file    Stress: Not on file   Relationships    Social connections     Talks on phone: Not on file     Gets together: Not on file     Attends Mu-ism service: Not on file     Active member of club or organization: Not on file     Attends meetings of clubs or organizations: Not on file     Relationship status: Not on file    Intimate partner violence     Fear of current or ex partner: Not on file     Emotionally abused: Not on file     Physically abused: Not on file     Forced sexual activity: Not on file   Other Topics Concern    Not on file   Social History /88   Pulse 76   Wt 179 lb 6.4 oz (81.4 kg)   SpO2 96%   BMI 29.85 kg/m²   Physical Exam  Vitals signs and nursing note reviewed. Constitutional:       Appearance: She is well-developed. She is not toxic-appearing. HENT:      Head: Normocephalic. Right Ear: Tympanic membrane, ear canal and external ear normal.      Left Ear: Tympanic membrane, ear canal and external ear normal.      Nose: Nose normal.      Mouth/Throat:      Mouth: Mucous membranes are moist.      Pharynx: No oropharyngeal exudate or posterior oropharyngeal erythema. Neck:      Thyroid: No thyroid mass or thyromegaly. Vascular: No carotid bruit. Cardiovascular:      Rate and Rhythm: Normal rate and regular rhythm. Pulses:           Dorsalis pedis pulses are 2+ on the right side and 2+ on the left side. Heart sounds: Normal heart sounds. No murmur. Comments: No LE edema  Pulmonary:      Effort: Pulmonary effort is normal.      Breath sounds: Normal breath sounds. Lymphadenopathy:      Cervical: No cervical adenopathy. Neurological:      General: No focal deficit present. Mental Status: She is alert and oriented to person, place, and time. Cranial Nerves: No cranial nerve deficit. Motor: No weakness. Coordination: Coordination normal.      Gait: Gait normal.   Psychiatric:         Mood and Affect: Mood normal.         Behavior: Behavior normal. Behavior is cooperative. ASSESSMENT/PLAN:    Problem List Items Addressed This Visit     Blurry vision     Encouraged to follow-up with ophthalmology and have her eyes rechecked. Discussed that this may be contributing to her headaches. Relevant Orders    MRI Brain WO Contrast    Dry eyes     Does have SSB and SSA ordered by Dr. Kanu Peralta. Also encouraged to follow-up with ophthalmology for further evaluation. Concern is for Sjogren's. Dry mouth     Does have SSB and SSA ordered by Dr. Kanu Peralta.          Migraine with

## 2020-03-19 RX ORDER — AMITRIPTYLINE HYDROCHLORIDE 10 MG/1
20-30 TABLET, FILM COATED ORAL NIGHTLY
Qty: 90 TABLET | Refills: 0 | Status: SHIPPED | OUTPATIENT
Start: 2020-03-19 | End: 2020-04-10 | Stop reason: SDUPTHER

## 2020-03-19 NOTE — TELEPHONE ENCOUNTER
I will approve 3 amitriptyline at night to try now or she can wait for Dr. Anuradha Berrios to get back. Already on Cymbalta and adding trazodone with cymbalta AND amitryptylline can give her too much serotonin.

## 2020-03-19 NOTE — TELEPHONE ENCOUNTER
Pt called to see if she can take traZODone (DESYREL) 50 MG tablet because she is not sleeping and is also needing a refill for amitriptyline (ELAVIL) 10 MG tablet due to her having to take 2 tablets. .the patient would like the RX to read bid

## 2020-04-03 ENCOUNTER — E-VISIT (OUTPATIENT)
Dept: INTERNAL MEDICINE CLINIC | Age: 55
End: 2020-04-03

## 2020-04-10 ENCOUNTER — VIRTUAL VISIT (OUTPATIENT)
Dept: INTERNAL MEDICINE CLINIC | Age: 55
End: 2020-04-10
Payer: COMMERCIAL

## 2020-04-10 PROCEDURE — 99213 OFFICE O/P EST LOW 20 MIN: CPT | Performed by: INTERNAL MEDICINE

## 2020-04-10 RX ORDER — AMITRIPTYLINE HYDROCHLORIDE 10 MG/1
30 TABLET, FILM COATED ORAL NIGHTLY
Qty: 90 TABLET | Refills: 5 | Status: SHIPPED | OUTPATIENT
Start: 2020-04-10 | End: 2020-07-14 | Stop reason: SDUPTHER

## 2020-04-10 NOTE — PROGRESS NOTES
TELEHEALTH EVALUATION -- Audio/Visual (During RWJSZ-80 public health emergency)    HPI    Here today for follow up on migraines. Seen via Telehealth d/t GSWSK-76 public health emergency. Migraines are much better since starting amitriptyline to 30 mg qd- has not needed to take any doses of Imitrex and has taken very little ibuprofen. Sleep is good with amitriptyline as well. Did take some adjustment but is doing better. No other concerns and is feeling well! Fatigue is worse currently d/t being out of the office.      Past Medical History:   Diagnosis Date    Chronic depression     DDD (degenerative disc disease), lumbar     Fibromyalgia     HTN (hypertension)     KARTHIK (obstructive sleep apnea)     Psoriatic arthritis ([facility] [address]        Past Surgical History:   Procedure Laterality Date    BLADDER SUSPENSION  2004     SECTION  08/31/1987    x1    CHOLECYSTECTOMY  2000    ENDOMETRIAL ABLATION  2007    HYSTERECTOMY, TOTAL ABDOMINAL  2009    heavy menses    NASAL SEPTUM SURGERY  2005    NERVE SURGERY Bilateral 2019    BILATERAL L4-5 AND L5-S1 LUMBAR FACET INJECTIONS WITH FLUOROSCOPY performed by Jacob Rinaldi MD at [place] [address]         Family History   Problem Relation Age of Onset    Cancer Mother         gallbladder cancer    High Blood Pressure Mother     Alcohol Abuse Mother     High Blood Pressure Father     Cancer Sister         small cell lung cancer (smoker)    Heart Disease Maternal Grandmother     Heart Disease Maternal Grandfather     Stroke Maternal Grandfather     Heart Disease Paternal Grandmother     Heart Disease Paternal Grandfather     No Known Problems Half-Brother     No Known Problems Half-Sister     Other Half-Sibling         skin cancer    Other Half-Sister         blood clots    Substance Abuse Daughter 32        in remission        Allergies   Allergen Reactions    Morphine Other (See Comments)     Chest tightness

## 2020-04-16 ENCOUNTER — VIRTUAL VISIT (OUTPATIENT)
Dept: RHEUMATOLOGY | Age: 55
End: 2020-04-16
Payer: COMMERCIAL

## 2020-04-16 PROCEDURE — 99214 OFFICE O/P EST MOD 30 MIN: CPT | Performed by: INTERNAL MEDICINE

## 2020-04-16 RX ORDER — DULOXETIN HYDROCHLORIDE 60 MG/1
60 CAPSULE, DELAYED RELEASE ORAL DAILY
Qty: 30 CAPSULE | Refills: 2 | Status: SHIPPED | OUTPATIENT
Start: 2020-04-16 | End: 2020-07-14 | Stop reason: SDUPTHER

## 2020-04-16 RX ORDER — SULFASALAZINE 500 MG/1
500 TABLET ORAL 2 TIMES DAILY
Qty: 60 TABLET | Refills: 2 | Status: SHIPPED | OUTPATIENT
Start: 2020-04-16 | End: 2020-07-14 | Stop reason: SDUPTHER

## 2020-04-16 NOTE — PROGRESS NOTES
above HPI     Allergies   Allergen Reactions    Morphine Other (See Comments)     Chest tightness       Past Medical History:        Diagnosis Date    Chronic depression     DDD (degenerative disc disease), lumbar     Fibromyalgia     HTN (hypertension)     KARTHIK (obstructive sleep apnea)     Psoriatic arthritis ([facility] [address]        Past Surgical History:        Procedure Laterality Date    BLADDER SUSPENSION  2004     SECTION  08/31/1987    x1    CHOLECYSTECTOMY  2000    ENDOMETRIAL ABLATION  2007    HYSTERECTOMY, TOTAL ABDOMINAL  2009    heavy menses    NASAL SEPTUM SURGERY  2005    NERVE SURGERY Bilateral 2019    BILATERAL L4-5 AND L5-S1 LUMBAR FACET INJECTIONS WITH FLUOROSCOPY performed by Jt Pineda MD at [place] [address]         Medications:    Current Outpatient Medications   Medication Sig Dispense Refill    DULoxetine (CYMBALTA) 60 MG extended release capsule Take 1 capsule by mouth daily 30 capsule 2    sulfaSALAzine (AZULFIDINE) 500 MG tablet Take 1 tablet by mouth 2 times daily 60 tablet 2    amitriptyline (ELAVIL) 10 MG tablet Take 3 tablets by mouth nightly 90 tablet 5    metoprolol tartrate (LOPRESSOR) 50 MG tablet TAKE 1 TABLET BY MOUTH TWO TIMES A DAY  180 tablet 1    SUMAtriptan (IMITREX) 100 MG tablet Take 1 tablet by mouth once as needed for Migraine May repeat once after 2 hours if needed. No more than 2 per 24 hour period. 9 tablet 1    ondansetron (ZOFRAN ODT) 8 MG TBDP disintegrating tablet Place 1 tablet under the tongue every 8 hours as needed for Nausea, Vomiting or Other (migraines) 20 tablet 1    pantoprazole (PROTONIX) 40 MG tablet Take 1 tablet by mouth every morning (before breakfast) 30 tablet 5    Cholecalciferol (VITAMIN D3) 2000 units CAPS Take by mouth       No current facility-administered medications for this visit.          OBJECTIVE:    PHYSICAL EXAMINATION:  Due to this being a TeleHealth encounter, evaluation of the following

## 2020-06-08 RX ORDER — PANTOPRAZOLE SODIUM 40 MG/1
TABLET, DELAYED RELEASE ORAL
Qty: 30 TABLET | Refills: 0 | Status: SHIPPED | OUTPATIENT
Start: 2020-06-08 | End: 2020-06-09 | Stop reason: SDUPTHER

## 2020-06-09 RX ORDER — PANTOPRAZOLE SODIUM 40 MG/1
40 TABLET, DELAYED RELEASE ORAL DAILY
Qty: 30 TABLET | Refills: 3 | Status: SHIPPED | OUTPATIENT
Start: 2020-06-09 | End: 2020-07-14 | Stop reason: SDUPTHER

## 2020-07-14 RX ORDER — SULFASALAZINE 500 MG/1
500 TABLET ORAL 2 TIMES DAILY
Qty: 60 TABLET | Refills: 1 | Status: SHIPPED | OUTPATIENT
Start: 2020-07-14 | End: 2020-08-20 | Stop reason: SDUPTHER

## 2020-07-14 RX ORDER — DULOXETIN HYDROCHLORIDE 60 MG/1
60 CAPSULE, DELAYED RELEASE ORAL DAILY
Qty: 30 CAPSULE | Refills: 1 | Status: SHIPPED | OUTPATIENT
Start: 2020-07-14 | End: 2020-07-29 | Stop reason: SDUPTHER

## 2020-07-14 NOTE — TELEPHONE ENCOUNTER
Last seen 4/16/2020  Last labs 4/17/2020  Next visit-not set, called patient left message to call and make a f/u with Dr Lucas Rojo

## 2020-07-14 NOTE — TELEPHONE ENCOUNTER
Provided 60 day supply, she needs to get safety labs for SSZ this month. Also needs to make f/u appt w/ me.

## 2020-07-29 RX ORDER — DULOXETIN HYDROCHLORIDE 60 MG/1
60 CAPSULE, DELAYED RELEASE ORAL DAILY
Qty: 30 CAPSULE | Refills: 0 | Status: SHIPPED | OUTPATIENT
Start: 2020-07-29 | End: 2020-07-31 | Stop reason: SDUPTHER

## 2020-07-30 ENCOUNTER — TELEPHONE (OUTPATIENT)
Dept: RHEUMATOLOGY | Age: 55
End: 2020-07-30

## 2020-07-30 NOTE — TELEPHONE ENCOUNTER
Last appt 4/20   Note on 7/14 Pt needs appt for further refills and message was left for pt that needs appt for refills per [facility]

## 2020-07-30 NOTE — TELEPHONE ENCOUNTER
[facility] IS CALLING ----You sent in script for 30 day supply of Cymbalta for the pt---[facility] is calling her insurance will not pay for 30 only 90 at a time---can you please call them to change---JESSICA Lynch. Thanks.

## 2020-07-31 ENCOUNTER — TELEPHONE (OUTPATIENT)
Dept: INTERNAL MEDICINE CLINIC | Age: 55
End: 2020-07-31

## 2020-07-31 RX ORDER — DULOXETIN HYDROCHLORIDE 60 MG/1
60 CAPSULE, DELAYED RELEASE ORAL DAILY
Qty: 90 CAPSULE | Refills: 0 | Status: SHIPPED | OUTPATIENT
Start: 2020-07-31 | End: 2020-08-03

## 2020-07-31 RX ORDER — DULOXETIN HYDROCHLORIDE 60 MG/1
60 CAPSULE, DELAYED RELEASE ORAL DAILY
Qty: 30 CAPSULE | Refills: 0 | Status: CANCELLED | OUTPATIENT
Start: 2020-07-31 | End: 2020-10-29

## 2020-07-31 NOTE — TELEPHONE ENCOUNTER
FYI - patient states she has new prescription coverage with [employer]. She states daily medications must be for 90 day supply and sent to [facility] on [facility].

## 2020-07-31 NOTE — TELEPHONE ENCOUNTER
Spoke wit pt she says she been on 90mg for a year she thinks another Dr did this Reached out to Dr Hernandez Code to see if can see next Wed 8:20 VV- Pt aware will will get back to her

## 2020-07-31 NOTE — TELEPHONE ENCOUNTER
Spoke with Dr Anu browning for 90 mg and pt has scheduled appt for 8/20 ( VV)   Med pended for Velia

## 2020-07-31 NOTE — TELEPHONE ENCOUNTER
Patient is requesting refills for cymbalta 30 mg and cymbalta 60 mg. She has new prescription insurance with [facility] [address] She states with new coverage she needs prescriptions to be for 90 day supply and sent to [facility] on [facility] in [facility].

## 2020-08-03 ENCOUNTER — VIRTUAL VISIT (OUTPATIENT)
Dept: INTERNAL MEDICINE CLINIC | Age: 55
End: 2020-08-03
Payer: COMMERCIAL

## 2020-08-03 ENCOUNTER — NURSE TRIAGE (OUTPATIENT)
Dept: OTHER | Facility: CLINIC | Age: 55
End: 2020-08-03

## 2020-08-03 PROCEDURE — 99213 OFFICE O/P EST LOW 20 MIN: CPT | Performed by: NURSE PRACTITIONER

## 2020-08-03 RX ORDER — ERYTHROMYCIN 5 MG/G
OINTMENT OPHTHALMIC
Qty: 1 G | Refills: 0 | Status: SHIPPED | OUTPATIENT
Start: 2020-08-03 | End: 2020-08-13

## 2020-08-03 RX ORDER — DULOXETIN HYDROCHLORIDE 30 MG/1
90 CAPSULE, DELAYED RELEASE ORAL DAILY
Qty: 270 CAPSULE | Refills: 0 | Status: SHIPPED | OUTPATIENT
Start: 2020-08-03 | End: 2020-08-19 | Stop reason: SDUPTHER

## 2020-08-03 ASSESSMENT — ENCOUNTER SYMPTOMS
EYE REDNESS: 1
EYE PAIN: 0
PHOTOPHOBIA: 0
EYE ITCHING: 1

## 2020-08-03 NOTE — PROGRESS NOTES
8/3/2020    TELEHEALTH EVALUATION -- Audio/Visual (During PUZRJ-00 public health emergency)    HPI:    Emely Fields (:  1965) has requested an audio/video evaluation for the following concern(s):    Developed bump on her right lower eyelid about 2-3 days ago. Seems to be getting larger. She has had styes in the past.  Most concerning is that she is feels she is developing a new stye and she has mild redness of her eye as well. She denies any eye pain, pain with eye movement, or loss of vision. She denies any injury or trauma. No treatments at home. Review of Systems   Constitutional: Negative for fever. Eyes: Positive for redness and itching. Negative for photophobia, pain and visual disturbance. Prior to Visit Medications    Medication Sig Taking? Authorizing Provider   erythromycin [facility]) 5 MG/GM ophthalmic ointment Apply 0.5 inch ointment 4 times daily for 5 days to right eyelid Yes MELISSA De Jesus - NATALIE   metoprolol tartrate (LOPRESSOR) 50 MG tablet Take 1 tablet by mouth 2 times daily Yes Taiwo Morin DO   amitriptyline (ELAVIL) 10 MG tablet Take 3 tablets by mouth nightly Yes Taiwo Morin DO   pantoprazole (PROTONIX) 40 MG tablet Take 1 tablet by mouth daily Yes Taiwo Morin DO   sulfaSALAzine (AZULFIDINE) 500 MG tablet Take 1 tablet by mouth 2 times daily Yes Alexei Lynne MD   ondansetron (ZOFRAN ODT) 8 MG TBDP disintegrating tablet Place 1 tablet under the tongue every 8 hours as needed for Nausea, Vomiting or Other (migraines) Yes Taiwo Morin, DO   Cholecalciferol (VITAMIN D3) 2000 units CAPS Take by mouth Yes Historical Provider, MD   DULoxetine (CYMBALTA) 30 MG extended release capsule Take 3 capsules by mouth daily  Alexei Lynne MD   SUMAtriptan (IMITREX) 100 MG tablet Take 1 tablet by mouth once as needed for Migraine May repeat once after 2 hours if needed. No more than 2 per 24 hour period.   Taiwo Morin, DO       Social History     Tobacco Use    Smoking status: Never Smoker    Smokeless tobacco: Never Used   Substance Use Topics    Alcohol use: Not on file     Comment: occasional     Drug use: Never            PHYSICAL EXAMINATION:  [ INSTRUCTIONS:  \"[x]\" Indicates a positive item  \"[]\" Indicates a negative item  -- DELETE ALL ITEMS NOT EXAMINED]  Vital Signs: (As obtained by patient/caregiver or practitioner observation)    Blood pressure-  Heart rate-    Respiratory rate-    Temperature-  Pulse oximetry-     Constitutional: [x] Appears well-developed and well-nourished [x] No apparent distress      [] Abnormal-   Mental status  [x] Alert and awake  [x] Oriented to person/place/time [x]Able to follow commands      Eyes:  EOM    [x]  Normal  [] Abnormal-  Sclera  [x]  Normal  [] Abnormal -         Discharge []  None visible  [x] Abnormal -unfortunately, the resolution is not clear enough to discern the stye or conjunctivitis she is reporting    HENT:   [x] Normocephalic, atraumatic. [] Abnormal   [x] Mouth/Throat: Mucous membranes are moist.     External Ears [x] Normal  [] Abnormal-     Neck: [x] No visualized mass     Pulmonary/Chest: [x] Respiratory effort normal.  [x] No visualized signs of difficulty breathing or respiratory distress        [] Abnormal-      Musculoskeletal:   [x] Normal gait with no signs of ataxia         [x] Normal range of motion of neck        [] Abnormal-       Neurological:        [x] No Facial Asymmetry (Cranial nerve 7 motor function) (limited exam to video visit)          [x] No gaze palsy        [] Abnormal-         Skin:        [x] No significant exanthematous lesions or discoloration noted on facial skin         [] Abnormal-            Psychiatric:       [x] Normal Affect [x] No Hallucinations        [] Abnormal-     Other pertinent observable physical exam findings-         ASSESSMENT/PLAN:  1.  Hordeolum externum of right lower eyelid  - erythromycin (ROMYCIN) 5 MG/GM ophthalmic ointment; in-person clinic visit. Patient and provider were located at their individual homes. --Olegario Cowden, APRN - CNP on 8/3/2020 at 11:35 AM    An electronic signature was used to authenticate this note.

## 2020-08-03 NOTE — TELEPHONE ENCOUNTER
Received call from [address] in [facility]. Yesterday woke with stye on the bottom of the right eye- tender to the touch. Still today raised and red. Today both eyes swollen with white or pale yellow drainage in the corner of the eyes. Protocol recommendation shared to be seen today and care advice shared. Call soft transferred to [facility]  in [facility] to schedule appointment. Please do not reply to the triage nurse through this encounter. Any subsequent communication should be directly with the patient.     Reason for Disposition   Eye pain/discomfort and more than mild    Protocols used: EYE PAIN-ADULT-[place]

## 2020-08-04 ENCOUNTER — NURSE TRIAGE (OUTPATIENT)
Dept: OTHER | Facility: CLINIC | Age: 55
End: 2020-08-04

## 2020-08-06 RX ORDER — PANTOPRAZOLE SODIUM 40 MG/1
40 TABLET, DELAYED RELEASE ORAL DAILY
Qty: 90 TABLET | Refills: 3 | Status: SHIPPED | OUTPATIENT
Start: 2020-08-06 | End: 2020-08-14 | Stop reason: SDUPTHER

## 2020-08-10 ENCOUNTER — VIRTUAL VISIT (OUTPATIENT)
Dept: INTERNAL MEDICINE CLINIC | Age: 55
End: 2020-08-10
Payer: COMMERCIAL

## 2020-08-10 PROBLEM — B02.23 POST-HERPETIC POLYNEUROPATHY: Status: ACTIVE | Noted: 2020-08-10

## 2020-08-10 PROBLEM — H00.012 HORDEOLUM EXTERNUM OF RIGHT LOWER EYELID: Status: ACTIVE | Noted: 2020-08-10

## 2020-08-10 PROBLEM — H00.011 HORDEOLUM EXTERNUM OF RIGHT UPPER EYELID: Status: ACTIVE | Noted: 2020-08-10

## 2020-08-10 PROCEDURE — 99213 OFFICE O/P EST LOW 20 MIN: CPT | Performed by: INTERNAL MEDICINE

## 2020-08-10 RX ORDER — LIDOCAINE 50 MG/G
1 PATCH TOPICAL DAILY
Qty: 30 PATCH | Refills: 0 | Status: SHIPPED | OUTPATIENT
Start: 2020-08-10 | End: 2020-09-09

## 2020-08-10 RX ORDER — LIDOCAINE 5% 5 G/100G
1 CREAM TOPICAL
Qty: 90 G | Refills: 1 | Status: SHIPPED | OUTPATIENT
Start: 2020-08-10 | End: 2020-09-18

## 2020-08-10 RX ORDER — GABAPENTIN 100 MG/1
100 CAPSULE ORAL 3 TIMES DAILY
Qty: 90 CAPSULE | Refills: 0 | Status: SHIPPED | OUTPATIENT
Start: 2020-08-10 | End: 2020-08-12 | Stop reason: ALTCHOICE

## 2020-08-10 NOTE — PROGRESS NOTES
Problems Half-Brother     No Known Problems Half-Sister     Other Half-Sibling         skin cancer    Other Half-Sister         blood clots    Substance Abuse Daughter 32        in remission        Allergies   Allergen Reactions    Morphine Other (See Comments)     Chest tightness       Current Outpatient Medications   Medication Sig Dispense Refill    lidocaine (LIDODERM) 5 % Place 1 patch onto the skin daily 12 hours on, 12 hours off. 30 patch 0    Lidocaine 5 % CREA Apply 1 each topically every 3 hours as needed (pain due to shingles) 90 g 1    DULoxetine (CYMBALTA) 30 MG extended release capsule Take 3 capsules by mouth daily 270 capsule 0    metoprolol tartrate (LOPRESSOR) 50 MG tablet Take 1 tablet by mouth 2 times daily 180 tablet 3    amitriptyline (ELAVIL) 10 MG tablet Take 3 tablets by mouth nightly 90 tablet 3    sulfaSALAzine (AZULFIDINE) 500 MG tablet Take 1 tablet by mouth 2 times daily 60 tablet 1    ondansetron (ZOFRAN ODT) 8 MG TBDP disintegrating tablet Place 1 tablet under the tongue every 8 hours as needed for Nausea, Vomiting or Other (migraines) 20 tablet 1    Cholecalciferol (VITAMIN D3) 2000 units CAPS Take by mouth      pantoprazole (PROTONIX) 40 MG tablet Take 1 tablet by mouth daily 90 tablet 3    predniSONE (DELTASONE) 10 MG tablet 4 po qd x 3 days -> 3 po qd x 3 days -> 2 po qd x 3 days -> 1 po qd x 3 days 30 tablet 0    cetirizine (ZYRTEC) 10 MG tablet Take 1 tablet by mouth daily 30 tablet 0    famotidine (PEPCID) 40 MG tablet Take 1 tablet by mouth every evening 30 tablet 3    hydrOXYzine (ATARAX) 25 MG tablet Take 1 tablet by mouth every 8 hours as needed for Itching 30 tablet 1    SUMAtriptan (IMITREX) 100 MG tablet Take 1 tablet by mouth once as needed for Migraine May repeat once after 2 hours if needed. No more than 2 per 24 hour period. 9 tablet 1     No current facility-administered medications for this visit.         Review of Systems -   ROS:  Constitutional negative for fevers, fatigue, unexpected weight changes  HEENT: negative for congestion, ST, ear pain  Eyes: no discharge, no change in vision  Endo: no low blood sugars, no polyurea, polydipsia,   Heart: no chest pain, no palpitations, no LE edema  Pulm: no SOB, wheezing, cough    PHYSICAL EXAMINATION:  [ INSTRUCTIONS:  \"[x]\" Indicates a positive item  \"[]\" Indicates a negative item  -- DELETE ALL ITEMS NOT EXAMINED]  Vital Signs: (As obtained by patient/caregiver or practitioner observation)    Blood pressure-  Heart rate-    Respiratory rate-    Temperature-  Pulse oximetry-     Constitutional:  [x] Appears well-developed and well-nourished [x] No apparent distress     [] Abnormal-   Mental status  [x] Alert and awake  [x] Oriented to person/place/time [x]Able to follow commands      Eyes:  EOM     [x]  Normal  [] Abnormal-  Sclera   [x]  Normal  [x] Abnormal - stye right lower eyelid. Non-vesicular in appearance. Discharge  [x]  None visible  [] Abnormal -    HENT:   [x] Normocephalic, atraumatic. [] Abnormal   [x] Mouth/Throat: Mucous membranes are moist.  [x] Normal hearing    External Ears [x] Normal  [] Abnormal-     Neck: [x] No visualized mass     Pulmonary/Chest:  [x] Respiratory effort normal.  [x] No visualized signs of difficulty breathing or respiratory distress         [] Abnormal-      Musculoskeletal: [x] Normal gait with no signs of ataxia          [x] Normal range of motion of neck         [] Abnormal-     Neurological:         [x] No Facial Asymmetry (Cranial nerve 7 motor function) (limited exam to video visit)           [x] No gaze palsy         [] Abnormal-         Skin:  [x] No significant exanthematous lesions or discoloration noted on facial skin          [x] Abnormal- papular-vesicular rash along T-1 Dermatome R (right UE from axilla-> forearm). 2 red papules on left arm  No lesions on face.           Psychiatric:  [x] Normal Affect [x] Normal Mood        [] Abnormal-     Other pertinent observable physical exam findings-     Due to this being a TeleHealth encounter, evaluation of the following organ systems is limited: Vitals/Constitutional/EENT/Resp/CV/GI//MS/Neuro/Skin/Heme-Lymph-Imm. Assessment and Plan  Post-herpetic polyneuropathy  Increase valsartan from 1000 mg twice daily for 10 days to 1000 mg 3 times daily for 7 days. Discussed postherpetic neuralgia. Discussed Shingrex in 6 months. Add gabapentin 100 mg nightly, titrating dose by 100 mg every 3 days up to 100 mg 3 times daily or 300 mg nightly. Add Lidoderm patches and lidocaine cream.    Hordeolum externum of right lower eyelid  Supportive care with warm compresses. Does not appear to be disseminated zoster. Pursuant to the emergency declaration under the [facility] waiver authority and the [employer] Act, this Virtual  Visit was conducted, with patient's consent, to reduce the patient's risk of exposure to COVID-19 and provide continuity of care for an established patient. Services were provided through a video synchronous discussion virtually to substitute for in-person clinic visit.

## 2020-08-11 ENCOUNTER — TELEPHONE (OUTPATIENT)
Dept: ADMINISTRATIVE | Age: 55
End: 2020-08-11

## 2020-08-12 ENCOUNTER — OFFICE VISIT (OUTPATIENT)
Dept: INTERNAL MEDICINE CLINIC | Age: 55
End: 2020-08-12
Payer: COMMERCIAL

## 2020-08-12 VITALS — OXYGEN SATURATION: 98 % | HEART RATE: 72 BPM | WEIGHT: 194.4 LBS | BODY MASS INDEX: 32.35 KG/M2

## 2020-08-12 PROBLEM — L50.9 URTICARIA: Status: ACTIVE | Noted: 2020-08-12

## 2020-08-12 PROBLEM — B02.23 POST-HERPETIC POLYNEUROPATHY: Status: RESOLVED | Noted: 2020-08-10 | Resolved: 2020-08-12

## 2020-08-12 PROCEDURE — 99213 OFFICE O/P EST LOW 20 MIN: CPT | Performed by: INTERNAL MEDICINE

## 2020-08-12 PROCEDURE — 96372 THER/PROPH/DIAG INJ SC/IM: CPT | Performed by: INTERNAL MEDICINE

## 2020-08-12 RX ORDER — PREDNISONE 10 MG/1
TABLET ORAL
Qty: 30 TABLET | Refills: 0 | Status: SHIPPED | OUTPATIENT
Start: 2020-08-12 | End: 2020-09-01 | Stop reason: SDUPTHER

## 2020-08-12 RX ORDER — HYDROXYZINE HYDROCHLORIDE 25 MG/1
25 TABLET, FILM COATED ORAL EVERY 8 HOURS PRN
Qty: 30 TABLET | Refills: 1 | Status: SHIPPED | OUTPATIENT
Start: 2020-08-12 | End: 2020-08-22

## 2020-08-12 RX ORDER — CETIRIZINE HYDROCHLORIDE 10 MG/1
10 TABLET ORAL DAILY
Qty: 30 TABLET | Refills: 0 | Status: SHIPPED | OUTPATIENT
Start: 2020-08-12 | End: 2020-09-04

## 2020-08-12 RX ORDER — METHYLPREDNISOLONE ACETATE 40 MG/ML
40 INJECTION, SUSPENSION INTRA-ARTICULAR; INTRALESIONAL; INTRAMUSCULAR; SOFT TISSUE ONCE
Status: COMPLETED | OUTPATIENT
Start: 2020-08-12 | End: 2020-08-12

## 2020-08-12 RX ORDER — FAMOTIDINE 40 MG/1
40 TABLET, FILM COATED ORAL EVERY EVENING
Qty: 30 TABLET | Refills: 3 | Status: SHIPPED | OUTPATIENT
Start: 2020-08-12 | End: 2020-09-18

## 2020-08-12 RX ADMIN — METHYLPREDNISOLONE ACETATE 40 MG: 40 INJECTION, SUSPENSION INTRA-ARTICULAR; INTRALESIONAL; INTRAMUSCULAR; SOFT TISSUE at 18:56

## 2020-08-12 ASSESSMENT — ENCOUNTER SYMPTOMS
DIARRHEA: 0
CONSTIPATION: 0
CHEST TIGHTNESS: 0
SHORTNESS OF BREATH: 0

## 2020-08-12 NOTE — PATIENT INSTRUCTIONS
Prednisone taper:        Prednisone 10 mg 4 pills x 3 days -> 3 pills x 3 days -> 2 pills x 3 days -> 1 pill x 3 days      Take Zyrtec 10 mg daily until hives have been gone for at least 1 week. Take Pepcid 40 mg daily until hives have been gone for at least 1 week. Take hydroxyzine 25 mg as needed up to 3 times daily for itching. Continue to use lidocaine cream for itching.

## 2020-08-12 NOTE — ASSESSMENT & PLAN NOTE
Was initially misdiagnosed as shingles based on distribution. Was started on Valtrex by urgent care. May discontinue Valtrex. Will give Depo-Medrol 40 mg IM x1 today in the office and treat with a prednisone taper. Add Zyrtec 10 mg daily and Pepcid 40 mg daily until hives have been gone for 1 week.

## 2020-08-12 NOTE — PROGRESS NOTES
Non-medical: Not on file   Tobacco Use    Smoking status: Never Smoker    Smokeless tobacco: Never Used   Substance and Sexual Activity    Alcohol use: Not on file     Comment: occasional     Drug use: Never    Sexual activity: Not on file   Lifestyle    Physical activity     Days per week: Not on file     Minutes per session: Not on file    Stress: Not on file   Relationships    Social connections     Talks on phone: Not on file     Gets together: Not on file     Attends [employer] service: Not on file     Active member of club or organization: Not on file     Attends meetings of clubs or organizations: Not on file     Relationship status: Not on file    Intimate partner violence     Fear of current or ex partner: Not on file     Emotionally abused: Not on file     Physically abused: Not on file     Forced sexual activity: Not on file   Other Topics Concern    Not on file   Social History Narrative    Not on file     Family History   Problem Relation Age of Onset    Cancer Mother         gallbladder cancer    High Blood Pressure Mother     Alcohol Abuse Mother     High Blood Pressure Father     Cancer Sister         small cell lung cancer (smoker)    Heart Disease Maternal Grandmother     Heart Disease Maternal Grandfather     Stroke Maternal Grandfather     Heart Disease Paternal Grandmother     Heart Disease Paternal Grandfather     No Known Problems Half-Brother     No Known Problems Half-Sister     Other Half-Sibling         skin cancer    Other Half-Sister         blood clots    Substance Abuse Daughter 32        in remission         Outpatient Medications Prior to Visit   Medication Sig Dispense Refill    lidocaine (LIDODERM) 5 % Place 1 patch onto the skin daily 12 hours on, 12 hours off.  30 patch 0    Lidocaine 5 % CREA Apply 1 each topically every 3 hours as needed (pain due to shingles) 90 g 1    pantoprazole (PROTONIX) 40 MG tablet Take 1 tablet by mouth daily 90 tablet 3    DULoxetine (CYMBALTA) 30 MG extended release capsule Take 3 capsules by mouth daily 270 capsule 0    erythromycin (ROMYCIN) 5 MG/GM ophthalmic ointment Apply 0.5 inch ointment 4 times daily for 5 days to right eyelid 1 g 0    metoprolol tartrate (LOPRESSOR) 50 MG tablet Take 1 tablet by mouth 2 times daily 180 tablet 3    amitriptyline (ELAVIL) 10 MG tablet Take 3 tablets by mouth nightly 90 tablet 3    sulfaSALAzine (AZULFIDINE) 500 MG tablet Take 1 tablet by mouth 2 times daily 60 tablet 1    ondansetron (ZOFRAN ODT) 8 MG TBDP disintegrating tablet Place 1 tablet under the tongue every 8 hours as needed for Nausea, Vomiting or Other (migraines) 20 tablet 1    Cholecalciferol (VITAMIN D3) 2000 units CAPS Take by mouth      gabapentin (NEURONTIN) 100 MG capsule Take 1 capsule by mouth 3 times daily for 30 days. Intended supply: 30 days 90 capsule 0    SUMAtriptan (IMITREX) 100 MG tablet Take 1 tablet by mouth once as needed for Migraine May repeat once after 2 hours if needed. No more than 2 per 24 hour period. 9 tablet 1     No facility-administered medications prior to visit. Patient'spast medical history, surgical history, family history, medications,  and allergies  were all reviewed and updated as appropriate today. Review of Systems   Constitutional: Negative for appetite change, fatigue and fever. Respiratory: Negative for chest tightness and shortness of breath. Cardiovascular: Negative for chest pain. Gastrointestinal: Negative for constipation and diarrhea. Skin: Negative for rash. Pulse 72   Wt 194 lb 6.4 oz (88.2 kg)   SpO2 98%   BMI 32.35 kg/m²   Physical Exam  Vitals signs and nursing note reviewed. Constitutional:       Appearance: She is well-developed. She is not toxic-appearing. HENT:      Head: Normocephalic.       Right Ear: Tympanic membrane, ear canal and external ear normal.      Left Ear: Tympanic membrane, ear canal and external ear normal.      Nose: Nose normal.   Eyes:      General:         Right eye: Hordeolum (lower lid, small.) present. No discharge. Extraocular Movements: Extraocular movements intact. Conjunctiva/sclera: Conjunctivae normal.      Pupils: Pupils are equal, round, and reactive to light. Neck:      Thyroid: No thyroid mass or thyromegaly. Vascular: No carotid bruit. Cardiovascular:      Rate and Rhythm: Normal rate and regular rhythm. Pulses:           Dorsalis pedis pulses are 2+ on the right side and 2+ on the left side. Heart sounds: Normal heart sounds. No murmur. Comments: No LE edema  Pulmonary:      Effort: Pulmonary effort is normal.      Breath sounds: Normal breath sounds. Lymphadenopathy:      Cervical: No cervical adenopathy. Skin:     Findings: Rash present. Rash is urticarial.      Comments: Rash involves b/l arms (R>L), abdomen, and legs. Neurological:      Mental Status: She is alert. Psychiatric:         Behavior: Behavior is cooperative. ASSESSMENT/PLAN:    Problem List Items Addressed This Visit     Hordeolum externum of right lower eyelid - Primary     Improving. Continue with supportive care. Urticaria     Was initially misdiagnosed as shingles based on distribution. Was started on Valtrex by urgent care. May discontinue Valtrex. Will give Depo-Medrol 40 mg IM x1 today in the office and treat with a prednisone taper. Add Zyrtec 10 mg daily and Pepcid 40 mg daily until hives have been gone for 1 week.                Current Outpatient Medications   Medication Sig Dispense Refill    predniSONE (DELTASONE) 10 MG tablet 4 po qd x 3 days -> 3 po qd x 3 days -> 2 po qd x 3 days -> 1 po qd x 3 days 30 tablet 0    cetirizine (ZYRTEC) 10 MG tablet Take 1 tablet by mouth daily 30 tablet 0    famotidine (PEPCID) 40 MG tablet Take 1 tablet by mouth every evening 30 tablet 3    hydrOXYzine (ATARAX) 25 MG tablet Take 1 tablet by mouth every 8 hours as needed for Itching 30 tablet 1    lidocaine (LIDODERM) 5 % Place 1 patch onto the skin daily 12 hours on, 12 hours off. 30 patch 0    Lidocaine 5 % CREA Apply 1 each topically every 3 hours as needed (pain due to shingles) 90 g 1    DULoxetine (CYMBALTA) 30 MG extended release capsule Take 3 capsules by mouth daily 270 capsule 0    metoprolol tartrate (LOPRESSOR) 50 MG tablet Take 1 tablet by mouth 2 times daily 180 tablet 3    amitriptyline (ELAVIL) 10 MG tablet Take 3 tablets by mouth nightly 90 tablet 3    sulfaSALAzine (AZULFIDINE) 500 MG tablet Take 1 tablet by mouth 2 times daily 60 tablet 1    ondansetron (ZOFRAN ODT) 8 MG TBDP disintegrating tablet Place 1 tablet under the tongue every 8 hours as needed for Nausea, Vomiting or Other (migraines) 20 tablet 1    Cholecalciferol (VITAMIN D3) 2000 units CAPS Take by mouth      pantoprazole (PROTONIX) 40 MG tablet Take 1 tablet by mouth daily 90 tablet 3    SUMAtriptan (IMITREX) 100 MG tablet Take 1 tablet by mouth once as needed for Migraine May repeat once after 2 hours if needed. No more than 2 per 24 hour period. 9 tablet 1     No current facility-administered medications for this visit. Return if symptoms worsen or fail to improve.

## 2020-08-14 ENCOUNTER — TELEPHONE (OUTPATIENT)
Dept: INTERNAL MEDICINE CLINIC | Age: 55
End: 2020-08-14

## 2020-08-14 RX ORDER — PANTOPRAZOLE SODIUM 40 MG/1
40 TABLET, DELAYED RELEASE ORAL DAILY
Qty: 90 TABLET | Refills: 3 | Status: SHIPPED | OUTPATIENT
Start: 2020-08-14 | End: 2021-08-15 | Stop reason: ALTCHOICE

## 2020-08-14 NOTE — TELEPHONE ENCOUNTER
Protonix 40 mg she needs a PA. Sent to the PA department.  Also sent script to [facility] and gave pt a GoodRx

## 2020-08-14 NOTE — TELEPHONE ENCOUNTER
----- Message from Vinita Bone sent at 8/14/2020  9:45 AM EDT -----  Subject: Message to Provider    QUESTIONS  Information for Provider? patient waiting on prior-auth for medication   called pentathlon its for the stomach please advise.  ---------------------------------------------------------------------------  --------------  CALL BACK INFO  What is the best way for the office to contact you? OK to leave message on   voicemail  Do not leave any message   patient will call back for answer  Preferred Call Back Phone Number? [phone]  ---------------------------------------------------------------------------  --------------  SCRIPT ANSWERS  Relationship to Patient?  Self

## 2020-08-16 NOTE — ASSESSMENT & PLAN NOTE
Increase valsartan from 1000 mg twice daily for 10 days to 1000 mg 3 times daily for 7 days. Discussed postherpetic neuralgia. Discussed Shingrex in 6 months. Add gabapentin 100 mg nightly, titrating dose by 100 mg every 3 days up to 100 mg 3 times daily or 300 mg nightly.   Add Lidoderm patches and lidocaine cream.

## 2020-08-17 ENCOUNTER — TELEPHONE (OUTPATIENT)
Dept: ADMINISTRATIVE | Age: 55
End: 2020-08-17

## 2020-08-17 NOTE — TELEPHONE ENCOUNTER
PA SUBMITTED VIA CMM FOR   Pantoprazole Sodium 40MG dr tablets  Key: [id number] - PA Case ID: [id number]   STATUS: PENDING

## 2020-08-18 RX ORDER — CLONAZEPAM 0.5 MG/1
TABLET ORAL
Qty: 30 TABLET | Refills: 3 | Status: SHIPPED | OUTPATIENT
Start: 2020-08-18 | End: 2021-03-05

## 2020-08-18 NOTE — TELEPHONE ENCOUNTER
Status: PA Response - Favorable   Drug: Pantoprazole Sodium 40MG dr tablets   Form: [employer] Electronic PA Form   Authorization Expiration Date: 2021-08-17

## 2020-08-19 ENCOUNTER — VIRTUAL VISIT (OUTPATIENT)
Dept: INTERNAL MEDICINE CLINIC | Age: 55
End: 2020-08-19
Payer: COMMERCIAL

## 2020-08-19 PROBLEM — H00.012 HORDEOLUM EXTERNUM OF RIGHT LOWER EYELID: Status: RESOLVED | Noted: 2020-08-10 | Resolved: 2020-08-19

## 2020-08-19 PROBLEM — B02.23 POST-HERPETIC POLYNEUROPATHY: Status: RESOLVED | Noted: 2020-08-10 | Resolved: 2020-08-19

## 2020-08-19 PROBLEM — Z63.8 STRESS DUE TO FAMILY TENSION: Status: ACTIVE | Noted: 2020-08-19

## 2020-08-19 PROBLEM — F41.9 ANXIETY: Status: ACTIVE | Noted: 2020-08-19

## 2020-08-19 PROCEDURE — 99213 OFFICE O/P EST LOW 20 MIN: CPT | Performed by: INTERNAL MEDICINE

## 2020-08-19 RX ORDER — DULOXETIN HYDROCHLORIDE 60 MG/1
60 CAPSULE, DELAYED RELEASE ORAL DAILY
Qty: 90 CAPSULE | Refills: 3 | Status: SHIPPED | OUTPATIENT
Start: 2020-08-19 | End: 2021-02-10 | Stop reason: SDUPTHER

## 2020-08-19 RX ORDER — DULOXETIN HYDROCHLORIDE 30 MG/1
30 CAPSULE, DELAYED RELEASE ORAL DAILY
Qty: 90 CAPSULE | Refills: 3 | Status: SHIPPED | OUTPATIENT
Start: 2020-08-19 | End: 2021-01-27 | Stop reason: SDUPTHER

## 2020-08-19 NOTE — ASSESSMENT & PLAN NOTE
Discussed referral to psychology to which patient is agreeable. Referral placed to Dr. Kyle Appiah. Continue Cymbalta 90 mg daily. Klonopin 0.5 mg 1/2-1 every 8 hours as needed for severe situational anxiety. Patient is aware that Klonopin is not indicated for long-term use. She is aware of risks of addiction.

## 2020-08-19 NOTE — PROGRESS NOTES
TELEHEALTH EVALUATION -- Audio/Visual (During AFIJU-20 public health emergency)    HPI    Video Visit done today. Has had a lot of family stress recently. Daughter, who is recovering from substance abuse x 5 years, has been seeing someone who is actively using. Was told that if she was seeing the BF who is using, she would be asked to move out. Daughter also works for patient. Found out about this on Thursday and Thursday evening her other 2 children moved her out. Daughter has apologized. Has not been sleeping due to stress and anxiety. Did take 1 Klonopin last night which did help her sleep. Previously took Klonopin, initially daily as she was instructed to do so by her previous PCP and then the next PCP told her that daily Klonopin was inappropriate and she was able to wean off of it entirely. Hives are getting better- was getting itchy last night but has calmed down a lot compared to last week.      Past Medical History:   Diagnosis Date    Chronic depression     DDD (degenerative disc disease), lumbar     Fibromyalgia     HTN (hypertension)     KARTHIK (obstructive sleep apnea)     Psoriatic arthritis ([facility] [address]        Past Surgical History:   Procedure Laterality Date    BLADDER SUSPENSION  2004     SECTION  08/31/1987    x1    CHOLECYSTECTOMY  2000    ENDOMETRIAL ABLATION  2007    HYSTERECTOMY, TOTAL ABDOMINAL  2009    heavy menses    NASAL SEPTUM SURGERY  2005    NERVE SURGERY Bilateral 2019    BILATERAL L4-5 AND L5-S1 LUMBAR FACET INJECTIONS WITH FLUOROSCOPY performed by Humphrey Faust MD at [place] [address]  2007       Family History   Problem Relation Age of Onset    Cancer Mother         gallbladder cancer    High Blood Pressure Mother     Alcohol Abuse Mother     High Blood Pressure Father    Petr Mccormack Cancer Sister         small cell lung cancer (smoker)    Heart Disease Maternal Grandmother     Heart Disease Maternal Grandfather     Stroke Maternal Grandfather     Heart Disease Paternal Grandmother     Heart Disease Paternal Grandfather     No Known Problems Half-Brother     No Known Problems Half-Sister     Other Half-Sibling         skin cancer    Other Half-Sister         blood clots    Substance Abuse Daughter 32        in remission        Allergies   Allergen Reactions    Morphine Other (See Comments)     Chest tightness       Current Outpatient Medications   Medication Sig Dispense Refill    DULoxetine (CYMBALTA) 30 MG extended release capsule Take 1 capsule by mouth daily Take with 60 mg Cymbalta 90 capsule 3    DULoxetine (CYMBALTA) 60 MG extended release capsule Take 1 capsule by mouth daily Take with 30 mg 90 capsule 3    clonazePAM (KLONOPIN) 0.5 MG tablet 1/2-1 po q 8 hours prn severe anxiety 30 tablet 3    pantoprazole (PROTONIX) 40 MG tablet Take 1 tablet by mouth daily 90 tablet 3    predniSONE (DELTASONE) 10 MG tablet 4 po qd x 3 days -> 3 po qd x 3 days -> 2 po qd x 3 days -> 1 po qd x 3 days 30 tablet 0    cetirizine (ZYRTEC) 10 MG tablet Take 1 tablet by mouth daily 30 tablet 0    famotidine (PEPCID) 40 MG tablet Take 1 tablet by mouth every evening 30 tablet 3    hydrOXYzine (ATARAX) 25 MG tablet Take 1 tablet by mouth every 8 hours as needed for Itching 30 tablet 1    lidocaine (LIDODERM) 5 % Place 1 patch onto the skin daily 12 hours on, 12 hours off. 30 patch 0    Lidocaine 5 % CREA Apply 1 each topically every 3 hours as needed (pain due to shingles) 90 g 1    metoprolol tartrate (LOPRESSOR) 50 MG tablet Take 1 tablet by mouth 2 times daily 180 tablet 3    amitriptyline (ELAVIL) 10 MG tablet Take 3 tablets by mouth nightly 90 tablet 3    sulfaSALAzine (AZULFIDINE) 500 MG tablet Take 1 tablet by mouth 2 times daily 60 tablet 1    SUMAtriptan (IMITREX) 100 MG tablet Take 1 tablet by mouth once as needed for Migraine May repeat once after 2 hours if needed. No more than 2 per 24 hour period.  9 tablet 1    ondansetron (ZOFRAN ODT) 8 MG TBDP disintegrating tablet Place 1 tablet under the tongue every 8 hours as needed for Nausea, Vomiting or Other (migraines) 20 tablet 1    Cholecalciferol (VITAMIN D3) 2000 units CAPS Take by mouth       No current facility-administered medications for this visit. Review of Systems -  ROS:  Constitutional negative for fevers, fatigue, unexpected weight changes  HEENT: negative for congestion, ST, ear pain  Eyes: no discharge, no change in vision  Endo: no low blood sugars, no polyurea, polydipsia,   Heart: no chest pain, no palpitations, no LE edema  Pulm: no SOB, wheezing, cough  GI: no changes in bowel function (no constipation, no diarrhea)  : no dysuria  Psych: positive depression, positive anxiety, no suicidal thoughts, positive for difficulty sleeping  . PHYSICAL EXAMINATION:  [ INSTRUCTIONS:  \"[x]\" Indicates a positive item  \"[]\" Indicates a negative item  -- DELETE ALL ITEMS NOT EXAMINED]      Constitutional:  [x] Appears well-developed and well-nourished [x] No apparent distress     [] Abnormal-   Mental status  [x] Alert and awake  [x] Oriented to person/place/time [x]Able to follow commands      Eyes:  EOM     [x]  Normal  [] Abnormal-  Sclera   [x]  Normal  [] Abnormal -         Discharge  []  None visible  [] Abnormal -    HENT:   [x] Normocephalic, atraumatic.   [] Abnormal   [x] Mouth/Throat: Mucous membranes are moist.  [x] Normal hearing    External Ears [x] Normal  [] Abnormal-     Neck: [x] No visualized mass     Pulmonary/Chest:  [x] Respiratory effort normal.  [x] No visualized signs of difficulty breathing or respiratory distress         [] Abnormal-      Musculoskeletal: [x] Normal gait with no signs of ataxia          [x] Normal range of motion of neck         [] Abnormal-     Neurological:         [x] No Facial Asymmetry (Cranial nerve 7 motor function) (limited exam to video visit)           [x] No gaze palsy         [] Abnormal-         Skin:  [x] No significant exanthematous lesions or discoloration noted on facial skin          [] Abnormal-            Psychiatric:  [x] Normal Affect [] Anxious, depressed Mood        [] Abnormal-     Other pertinent observable physical exam findings-     Due to this being a TeleHealth encounter, evaluation of the following organ systems is limited: Vitals/Constitutional/EENT/Resp/CV/GI//MS/Neuro/Skin/Heme-Lymph-Imm. Assessment and Plan  Anxiety  Acute situational anxiety related to current familial situation. Continue Klonopin 0.5 mg 1/2-1 every 8 hours as needed. Discussed appropriate use of Klonopin and short-term situational anxiety that she is not able to control. Also recommend referral to psychology for which she is agreeable. Referral placed to Dr. Ubaldo Pagan. Continue Cymbalta 90 mg daily. May need to consider increasing to 120 mg daily. Chronic depression  Worse currently related to familial stress. Continue Cymbalta 90 mg daily. Referral offered to psychology and patient is agreeable. Referral placed to Dr. Ubaldo Pagan. Stress due to family tension  Discussed referral to psychology to which patient is agreeable. Referral placed to Dr. Ubaldo Pagan. Continue Cymbalta 90 mg daily. Klonopin 0.5 mg 1/2-1 every 8 hours as needed for severe situational anxiety. Patient is aware that Klonopin is not indicated for long-term use. She is aware of risks of addiction. Urticaria  Flared slightly after incident with her daughter on Thursday night but hives have since calmed down. She will continue with prednisone taper. Advised to continue Pepcid and Zyrtec until hives have been gone for 2 weeks.         Pursuant to the emergency declaration under the [facility] [address] waiver authority and the [employer] Act, this Virtual  Visit was conducted, with patient's consent, to reduce the patient's risk of exposure to COVID-19 and provide continuity of care for an established patient. Services were provided through a video synchronous discussion virtually to substitute for in-person clinic visit.

## 2020-08-19 NOTE — ASSESSMENT & PLAN NOTE
Acute situational anxiety related to current familial situation. Continue Klonopin 0.5 mg 1/2-1 every 8 hours as needed. Discussed appropriate use of Klonopin and short-term situational anxiety that she is not able to control. Also recommend referral to psychology for which she is agreeable. Referral placed to Dr. Elio Davis. Continue Cymbalta 90 mg daily. May need to consider increasing to 120 mg daily.

## 2020-08-20 ENCOUNTER — VIRTUAL VISIT (OUTPATIENT)
Dept: RHEUMATOLOGY | Age: 55
End: 2020-08-20
Payer: COMMERCIAL

## 2020-08-20 PROCEDURE — 99214 OFFICE O/P EST MOD 30 MIN: CPT | Performed by: INTERNAL MEDICINE

## 2020-08-20 RX ORDER — SULFASALAZINE 500 MG/1
500 TABLET ORAL 2 TIMES DAILY
Qty: 60 TABLET | Refills: 2 | Status: SHIPPED | OUTPATIENT
Start: 2020-08-20 | End: 2020-11-12 | Stop reason: SDUPTHER

## 2020-08-20 NOTE — PROGRESS NOTES
meds:  Meloxicam provided short term pain relief but worsened her GERD, she thinks she took Celecoxib for a couple of yrs in her 30s  Celecoxib 100 mg BID: caused GERD  Medrol dose pack: significantly improves joint pain  MTX: per pt oral MTX caused thrush, she thinks this \"worked a little bit\"  SSZ: per pt caused thrush  Humira: per pt this was [employer]" but it stopped working after a few months  Stelara: per pt this worked mainly for her joints - she felt this worked but stopped after two months d/t insurance issues   Flexeril: ineffective for back pain  Gabapentin: does not remember response  Paxil, Effexor    Past medical/surgical history, medications and allergies are reviewed and updated as appropriate. Interval Hx:   Pt states her PsA remains well controlled on SSZ. She denies any joint pain, swelling or morning stiffness. Fingertips occasionally feel \"irritated\". Skin had remained unclear until she developed generalized hives recently. Denies swelling of tongue, lips, wheezing or SOB. Pt attributes her hives to increased stress. She has been started on oral Prednisone, Zyrtec, Atarax by her PCP. She reports improvement in her hives. She reports chronic dryness in her eyes and mouth. 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     Rheumatologic ROS:  Constitutional: denies chronic fatigue, fever/chills, night sweats, unintentional weight loss  Integumentary: +chronic diffuse hair thinning, denies photosensitivity, rash, or Sx of Raynaud's phenomenon  Eyes: +dry eyes lately she feels \"they're irritating\" has not had any recent eye exam, denies redness or pain, visual disturbance, or floaters  Ears: denies hearing loss, tinnitus, vertigo, or recurrent ear infections  Nose: denies nasal ulcers or recurrent sinusitis  Oral cavity: +dry mouth, denies oral ulcers  Cardiovascular: denies CP, palpitations, Hx of pericardial effusion or pericarditis  Respiratory: denies SOB, cough, hemoptysis, or pleurisy  Gastrointestinal: +chronic GERD refer to above HPI, denies chronic diarrhea or bloody stools  Musculoskeletal:  refer to above HPI     Allergies   Allergen Reactions    Morphine Other (See Comments)     Chest tightness       Past Medical History:        Diagnosis Date    Chronic depression     DDD (degenerative disc disease), lumbar     Fibromyalgia     HTN (hypertension)     KARTHIK (obstructive sleep apnea)     Psoriatic arthritis ([address]        Past Surgical History:        Procedure Laterality Date    BLADDER SUSPENSION  2004     SECTION  08/31/1987    x1    CHOLECYSTECTOMY  2000    ENDOMETRIAL ABLATION  2007    HYSTERECTOMY, TOTAL ABDOMINAL  2009    heavy menses    NASAL SEPTUM SURGERY  2005    NERVE SURGERY Bilateral 2019    BILATERAL L4-5 AND L5-S1 LUMBAR FACET INJECTIONS WITH FLUOROSCOPY performed by David Mg MD at [place] [address]         Medications:    Current Outpatient Medications   Medication Sig Dispense Refill    sulfaSALAzine (AZULFIDINE) 500 MG tablet Take 1 tablet by mouth 2 times daily 60 tablet 2    DULoxetine (CYMBALTA) 30 MG extended release capsule Take 1 capsule by mouth daily Take with 60 mg Cymbalta 90 capsule 3    DULoxetine (CYMBALTA) 60 MG extended release capsule Take 1 capsule by mouth daily Take with 30 mg 90 capsule 3    clonazePAM (KLONOPIN) 0.5 MG tablet 1/2-1 po q 8 hours prn severe anxiety 30 tablet 3    pantoprazole (PROTONIX) 40 MG tablet Take 1 tablet by mouth daily 90 tablet 3    predniSONE (DELTASONE) 10 MG tablet 4 po qd x 3 days -> 3 po qd x 3 days -> 2 po qd x 3 days -> 1 po qd x 3 days 30 tablet 0    cetirizine (ZYRTEC) 10 MG tablet Take 1 tablet by mouth daily 30 tablet 0    famotidine (PEPCID) 40 MG tablet Take 1 tablet by mouth every evening 30 tablet 3    hydrOXYzine (ATARAX) 25 MG tablet Take 1 tablet by mouth every 8 hours as needed for serologies, Quantiferon TB, HIV screen     Negative JULIANO, SSA, SSB (3/4/20)     CRP and ESR wnl (7/24/19, 3/4/20)    Imaging:  I personally reviewed interval imaging and discussed w/ the pt in detail which included:    MRI L-spine (7/10/19): Mild neural foraminal narrowing.  No significant spinal canal stenosis.       X-rays (7/24/19):  R hand:  No fracture or dislocation. No underlying degenerative changes. Joint spaces are normal with no significant osteophytes. No inflammatory changes. No erosions. No soft tissue swelling.      L hand:  No fracture or dislocation. No underlying degenerative changes. Joint spaces are normal with no significant osteophytes.  No inflammatory changes. No erosions. No soft tissue swelling.      R knee:  No fracture or dislocation. No underlying degenerative changes. Joint spaces are normal with no significant osteophytes.  No inflammatory changes. No erosions. No soft tissue swelling.      L knee:  No fracture or dislocation. No underlying degenerative changes. Joint spaces are normal with no significant osteophytes. No inflammatory changes. No erosions. No soft tissue swelling.      SI joints:  Normal, symmetric appearance of the b/l SI joints.  No widening or significant sclerosis.  No erosions are appreciated.  Pelvic bones are otherwise unremarkable.  No significant soft tissue findings.      I independently reviewed above X-rays - mild OA changes in the b/l PIP joints otherwise well preserved joint spaces, no significant juxta-articular osteopenia, no erosive changes seen. Yesenia Angle w/ mild medial joint space narrowing, no chondrocalcinosis.  SI joints patent w/o erosive changes, some sclerosis.     MRI R hand (9/3/19)  No erosive changes or synovitis.      MRI pelvis (9/3/19): No evidence of active sacroiliitis. Above results were discussed w/ the pt in detail during today's visit. ASSESSMENT/PLAN:   Inflammatory arthralgias significantly improved since starting SSZ.   Inflammatory markers wnl last month. Discussed importance of safety labs kaz after starting SSZ, pt promised that she will obtain safety labs this wk. Discussed the s/e and risks of SSZ.     Discussed her negative JULIANO, SSA and SSB serologies. Recommended artificial tears for dry eyes and instructed pt to have her eye exam records faxed to our office. Cont OTC Biotene products. Krista Jesus was seen today for follow-up. Diagnoses and all orders for this visit:    Psoriatic arthritis ([address]  -     sulfaSALAzine (AZULFIDINE) 500 MG tablet; Take 1 tablet by mouth 2 times daily    High risk medication use    Fibromyalgia    Encounter for therapeutic drug monitoring    Psoriasis    Sicca complex ([facility] [address]          No orders of the defined types were placed in this encounter.     Outpatient Encounter Medications as of 8/20/2020   Medication Sig Dispense Refill    sulfaSALAzine (AZULFIDINE) 500 MG tablet Take 1 tablet by mouth 2 times daily 60 tablet 2    DULoxetine (CYMBALTA) 30 MG extended release capsule Take 1 capsule by mouth daily Take with 60 mg Cymbalta 90 capsule 3    DULoxetine (CYMBALTA) 60 MG extended release capsule Take 1 capsule by mouth daily Take with 30 mg 90 capsule 3    clonazePAM (KLONOPIN) 0.5 MG tablet 1/2-1 po q 8 hours prn severe anxiety 30 tablet 3    pantoprazole (PROTONIX) 40 MG tablet Take 1 tablet by mouth daily 90 tablet 3    predniSONE (DELTASONE) 10 MG tablet 4 po qd x 3 days -> 3 po qd x 3 days -> 2 po qd x 3 days -> 1 po qd x 3 days 30 tablet 0    cetirizine (ZYRTEC) 10 MG tablet Take 1 tablet by mouth daily 30 tablet 0    famotidine (PEPCID) 40 MG tablet Take 1 tablet by mouth every evening 30 tablet 3    hydrOXYzine (ATARAX) 25 MG tablet Take 1 tablet by mouth every 8 hours as needed for Itching 30 tablet 1    Lidocaine 5 % CREA Apply 1 each topically every 3 hours as needed (pain due to shingles) 90 g 1    metoprolol tartrate (LOPRESSOR) 50 MG tablet Take 1 tablet by mouth 2 times daily 180 tablet 3    amitriptyline (ELAVIL) 10 MG tablet Take 3 tablets by mouth nightly 90 tablet 3    ondansetron (ZOFRAN ODT) 8 MG TBDP disintegrating tablet Place 1 tablet under the tongue every 8 hours as needed for Nausea, Vomiting or Other (migraines) 20 tablet 1    Cholecalciferol (VITAMIN D3) 2000 units CAPS Take by mouth      lidocaine (LIDODERM) 5 % Place 1 patch onto the skin daily 12 hours on, 12 hours off. 30 patch 0    [DISCONTINUED] sulfaSALAzine (AZULFIDINE) 500 MG tablet Take 1 tablet by mouth 2 times daily 60 tablet 1    SUMAtriptan (IMITREX) 100 MG tablet Take 1 tablet by mouth once as needed for Migraine May repeat once after 2 hours if needed. No more than 2 per 24 hour period. 9 tablet 1     No facility-administered encounter medications on file as of 8/20/2020. Return in about 3 months (around 11/20/2020) for lab result discussion and treatment plan, medication monitoring. The risks and benefits of my recommendations, as well as other treatment options, benefits and side effects were discussed with the patient today. Questions were answered. NOTE: This report is transcribed by using voice recognition software dragon. Every effort is made to ensure accuracy; however, inadvertent computerized  transcription errors may be present.

## 2020-09-01 RX ORDER — HYDROXYZINE HYDROCHLORIDE 25 MG/1
25 TABLET, FILM COATED ORAL EVERY 8 HOURS PRN
Qty: 30 TABLET | Refills: 0 | Status: SHIPPED | OUTPATIENT
Start: 2020-09-01 | End: 2020-09-11

## 2020-09-01 RX ORDER — PREDNISONE 10 MG/1
TABLET ORAL
Qty: 30 TABLET | Refills: 0 | Status: SHIPPED | OUTPATIENT
Start: 2020-09-01 | End: 2020-09-18

## 2020-09-15 ENCOUNTER — NURSE TRIAGE (OUTPATIENT)
Dept: OTHER | Facility: CLINIC | Age: 55
End: 2020-09-15

## 2020-09-15 NOTE — TELEPHONE ENCOUNTER
Reason for Disposition   Intermittent burning pains radiating into chest or sour taste in mouth    Answer Assessment - Initial Assessment Questions  1. LOCATION: \"Where does it hurt? \"       Upper stomach pain  2. RADIATION: \"Does the pain shoot anywhere else? \" (e.g., chest, back)      Left shoulder   3. ONSET: \"When did the pain begin? \" (e.g., minutes, hours or days ago)       Three to four weeks   4. SUDDEN: \"Gradual or sudden onset? \"      sudden  5. PATTERN \"Does the pain come and go, or is it constant? \"     - If constant: \"Is it getting better, staying the same, or worsening? \"       (Note: Constant means the pain never goes away completely; most serious pain is constant and it progresses)      - If intermittent: \"How long does it last?\" \"Do you have pain now? \"      (Note: Intermittent means the pain goes away completely between bouts)      Intermittent after eating usually lasts about an hour about fifteen minutes after eating   6. SEVERITY: \"How bad is the pain? \"  (e.g., Scale 1-10; mild, moderate, or severe)     - MILD (1-3): doesn't interfere with normal activities, abdomen soft and not tender to touch      - MODERATE (4-7): interferes with normal activities or awakens from sleep, tender to touch      - SEVERE (8-10): excruciating pain, doubled over, unable to do any normal activities        Severe yesterday lasted for three hours  7. RECURRENT SYMPTOM: \"Have you ever had this type of abdominal pain before? \" If so, ask: \"When was the last time? \" and \"What happened that time? \"       no  8. AGGRAVATING FACTORS: \"Does anything seem to cause this pain? \" (e.g., foods, stress, alcohol)      Eating   9. CARDIAC SYMPTOMS: \"Do you have any of the following symptoms: chest pain, difficulty breathing, sweating, nausea? \"      no  10. OTHER SYMPTOMS: \"Do you have any other symptoms? \" (e.g., fever, vomiting, diarrhea)        No   11. PREGNANCY: \"Is there any chance you are pregnant? \" \"When was your last menstrual period? \"        n/a    Protocols used: ABDOMINAL PAIN - UPPER-ADULT-OH    Pt has hiatal hernia and acid reflux   Caller provided care advice and instructed to call back with worsening symptoms. Please do not respond to the triage nurse through this encounter. Any subsequent communication should be directly with the patient.   Warm transfer to [facility]

## 2020-09-18 ENCOUNTER — OFFICE VISIT (OUTPATIENT)
Dept: INTERNAL MEDICINE CLINIC | Age: 55
End: 2020-09-18
Payer: COMMERCIAL

## 2020-09-18 VITALS
TEMPERATURE: 96.4 F | BODY MASS INDEX: 32.52 KG/M2 | HEART RATE: 62 BPM | DIASTOLIC BLOOD PRESSURE: 82 MMHG | OXYGEN SATURATION: 96 % | SYSTOLIC BLOOD PRESSURE: 132 MMHG | WEIGHT: 195.4 LBS

## 2020-09-18 PROCEDURE — 90750 HZV VACC RECOMBINANT IM: CPT | Performed by: NURSE PRACTITIONER

## 2020-09-18 PROCEDURE — 90471 IMMUNIZATION ADMIN: CPT | Performed by: NURSE PRACTITIONER

## 2020-09-18 PROCEDURE — 90472 IMMUNIZATION ADMIN EACH ADD: CPT | Performed by: NURSE PRACTITIONER

## 2020-09-18 PROCEDURE — 90686 IIV4 VACC NO PRSV 0.5 ML IM: CPT | Performed by: NURSE PRACTITIONER

## 2020-09-18 PROCEDURE — 93000 ELECTROCARDIOGRAM COMPLETE: CPT | Performed by: NURSE PRACTITIONER

## 2020-09-18 PROCEDURE — 99214 OFFICE O/P EST MOD 30 MIN: CPT | Performed by: NURSE PRACTITIONER

## 2020-09-18 RX ORDER — FAMOTIDINE 40 MG/1
40 TABLET, FILM COATED ORAL EVERY EVENING
Qty: 30 TABLET | Refills: 0 | Status: SHIPPED | OUTPATIENT
Start: 2020-09-18 | End: 2020-10-22 | Stop reason: SDUPTHER

## 2020-09-18 RX ORDER — SUCRALFATE 1 G/1
1 TABLET ORAL 4 TIMES DAILY
Qty: 120 TABLET | Refills: 0 | Status: SHIPPED | OUTPATIENT
Start: 2020-09-18 | End: 2020-12-09

## 2020-09-18 ASSESSMENT — ENCOUNTER SYMPTOMS
CHEST TIGHTNESS: 0
COUGH: 0
WHEEZING: 0
VOMITING: 0
ABDOMINAL DISTENTION: 0
DIARRHEA: 0
ANAL BLEEDING: 0
SHORTNESS OF BREATH: 0
ABDOMINAL PAIN: 0
NAUSEA: 0
CONSTIPATION: 0

## 2020-09-18 NOTE — PROGRESS NOTES
9/18/20     Chief Complaint   Patient presents with    Abdominal Pain     15 mins after eating gets abd pain and acid reflux, thinks it's an ulcer    Stye     has had it for a while,just won't seem to go away     HPI     Patient presents today with abominal pain x 3 weeks. Pain is in epigastric region and up into left shoulder. Reports pain as an \"extreme burn\" with acid reflux present. Pain is sporadic not with every meal, but when it does occur it is after eating. Not precipitated by fatty meal.  Patient currently on protonix that helps relieve her \"normal acid reflux symptoms\". Patient does have a hiatal hernia. History of previous EGD in 5 years that was normal. Had unrelated hives and was on famotidine and steroids up until 2 weeks ago. Famotidine helped with her increased acid reflux symptoms. Patient due for shingles and flu vaccine--given today. Patient to follow up this week for mammogram for breast cancer screening. HTN: Bp controlled on lopressor, 132/82 today, taking medication without symptoms. Denies SOB, chest pain, or headaches. Depression/Anxiety: symptoms well controlled on klonipin and cymbalta. Denies SI/HI. Overall patient feeling well. Stye: patient c/o recurrent stye on right lower eye lid. Comes on periodically and goes away with cream patient has from eye doctor.        Allergies   Allergen Reactions    Morphine Other (See Comments)     Chest tightness     Current Outpatient Medications   Medication Sig Dispense Refill    sucralfate (CARAFATE) 1 GM tablet Take 1 tablet by mouth 4 times daily 120 tablet 0    famotidine (PEPCID) 40 MG tablet Take 1 tablet by mouth every evening 30 tablet 0    sulfaSALAzine (AZULFIDINE) 500 MG tablet Take 1 tablet by mouth 2 times daily 60 tablet 2    DULoxetine (CYMBALTA) 30 MG extended release capsule Take 1 capsule by mouth daily Take with 60 mg Cymbalta 90 capsule 3    DULoxetine (CYMBALTA) 60 MG extended release capsule Take 1 capsule by mouth daily Take with 30 mg 90 capsule 3    clonazePAM (KLONOPIN) 0.5 MG tablet 1/2-1 po q 8 hours prn severe anxiety 30 tablet 3    pantoprazole (PROTONIX) 40 MG tablet Take 1 tablet by mouth daily 90 tablet 3    metoprolol tartrate (LOPRESSOR) 50 MG tablet Take 1 tablet by mouth 2 times daily 180 tablet 3    amitriptyline (ELAVIL) 10 MG tablet Take 3 tablets by mouth nightly 90 tablet 3    ondansetron (ZOFRAN ODT) 8 MG TBDP disintegrating tablet Place 1 tablet under the tongue every 8 hours as needed for Nausea, Vomiting or Other (migraines) 20 tablet 1    Cholecalciferol (VITAMIN D3) 2000 units CAPS Take by mouth      SUMAtriptan (IMITREX) 100 MG tablet Take 1 tablet by mouth once as needed for Migraine May repeat once after 2 hours if needed. No more than 2 per 24 hour period. 9 tablet 1     No current facility-administered medications for this visit. Review of Systems   Constitutional: Negative for chills, fatigue and fever. HENT: Negative. Eyes:        C/o recurrent right lower lid stye     Respiratory: Negative for cough, chest tightness, shortness of breath and wheezing. Cardiovascular: Negative for chest pain, palpitations and leg swelling. Gastrointestinal: Negative for abdominal distention, abdominal pain, anal bleeding, constipation, diarrhea, nausea and vomiting. Increased GERD   Genitourinary: Negative. Skin: Negative. Neurological: Negative for dizziness, tremors, light-headedness and headaches. Vitals:    09/18/20 1045   BP: 132/82   Pulse: 62   Temp: 96.4 °F (35.8 °C)   SpO2: 96%   Weight: 195 lb 6.4 oz (88.6 kg)      Physical Exam  Vitals signs reviewed. Constitutional:       General: She is not in acute distress. Appearance: She is well-developed and normal weight. She is not ill-appearing or diaphoretic. HENT:      Head: Normocephalic and atraumatic.    Eyes:      Comments: Right lower lid stye     Neck:      Musculoskeletal: Normal range of recurrent  -patient to follow up with her eye doctor. Need for shingles vaccine  - Zoster recombinant [facility])    Need for influenza vaccination  - INFLUENZA, QUADV, 3 YRS AND OLDER, IM PF, PREFILL SYR OR SDV, 0.5ML (AFLURIA QUADV, PF)    Breast cancer screening by mammogram  Patient aware to schedule routine screening.  - KIERSTEN BARRY DIGITAL SCREEN BILATERAL; Future    Discussed medications with patient, who voiced understanding of their use and indications. All questions answered. Follow up in 6 months or sooner if problems persist or worsen.      Electronically signed by MELISSA Menjivar CNP on 9/18/2020 at 12:22 PM

## 2020-09-25 ENCOUNTER — TELEPHONE (OUTPATIENT)
Dept: INTERNAL MEDICINE CLINIC | Age: 55
End: 2020-09-25

## 2020-09-25 ENCOUNTER — HOSPITAL ENCOUNTER (OUTPATIENT)
Dept: MAMMOGRAPHY | Age: 55
Discharge: HOME | End: 2020-09-25
Payer: COMMERCIAL

## 2020-09-25 PROCEDURE — 77063 BREAST TOMOSYNTHESIS BI: CPT

## 2020-09-25 NOTE — TELEPHONE ENCOUNTER
----- Message from Taye Mac sent at 9/25/2020  1:39 PM EDT -----  Subject: Message to Provider    QUESTIONS  Information for Provider? pt called to find out if her medical records   that office has includes mammogram results/info/films from previous   physician  ---------------------------------------------------------------------------  --------------  [address]  What is the best way for the office to contact you? OK to leave message on   voicemail  Preferred Call Back Phone Number? [phone]  ---------------------------------------------------------------------------  --------------  SCRIPT ANSWERS  Relationship to Patient?  Self

## 2020-10-02 ENCOUNTER — OFFICE VISIT (OUTPATIENT)
Dept: PSYCHOLOGY | Age: 55
End: 2020-10-02
Payer: COMMERCIAL

## 2020-10-02 PROCEDURE — 90791 PSYCH DIAGNOSTIC EVALUATION: CPT | Performed by: PSYCHOLOGIST
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
## 2020-10-02 NOTE — TELEPHONE ENCOUNTER
Pt called back- mammogram was done 9/25/2018 at [facility]. I abstracted the results and now in chart.

## 2020-10-14 PROBLEM — F41.1 GAD (GENERALIZED ANXIETY DISORDER): Status: ACTIVE | Noted: 2020-10-14

## 2020-10-14 PROBLEM — F33.1 MODERATE EPISODE OF RECURRENT MAJOR DEPRESSIVE DISORDER (HCC): Status: ACTIVE | Noted: 2020-10-14

## 2020-10-22 ENCOUNTER — OFFICE VISIT (OUTPATIENT)
Dept: PRIMARY CARE CLINIC | Age: 55
End: 2020-10-22
Payer: COMMERCIAL

## 2020-10-22 PROCEDURE — 99211 OFF/OP EST MAY X REQ PHY/QHP: CPT | Performed by: NURSE PRACTITIONER

## 2020-10-22 NOTE — PROGRESS NOTES
Yolanda Martinez received a viral test for COVID-19. They were educated on isolation and quarantine as appropriate. For any symptoms, they were directed to seek care from their PCP, given contact information to establish with a doctor, directed to an urgent care or the emergency room.

## 2020-10-22 NOTE — PATIENT INSTRUCTIONS

## 2020-10-23 LAB — SARS-COV-2, NAA: NOT DETECTED

## 2020-10-26 ENCOUNTER — TELEPHONE (OUTPATIENT)
Dept: RHEUMATOLOGY | Age: 55
End: 2020-10-26

## 2020-10-26 RX ORDER — GUAIFENESIN AND CODEINE PHOSPHATE 100; 10 MG/5ML; MG/5ML
5 SOLUTION ORAL 2 TIMES DAILY PRN
Qty: 118 ML | Refills: 0 | Status: SHIPPED | OUTPATIENT
Start: 2020-10-26 | End: 2020-11-09

## 2020-10-26 RX ORDER — BENZONATATE 200 MG/1
200 CAPSULE ORAL 3 TIMES DAILY PRN
Qty: 30 CAPSULE | Refills: 0 | Status: SHIPPED | OUTPATIENT
Start: 2020-10-26 | End: 2020-11-02

## 2020-10-26 NOTE — TELEPHONE ENCOUNTER
Patient calling-states her daughter tested positive last week. States she tested last thurs 10/22/2020-came back negative-then she started with symptoms on Friday 10/23/2020-she is having bad headache,bodyaches, scratchy throat,cough,diarrhea and nausea.  Patient wants to know should she retest. Please call her at [phone]

## 2020-10-28 ENCOUNTER — OFFICE VISIT (OUTPATIENT)
Dept: PRIMARY CARE CLINIC | Age: 55
End: 2020-10-28
Payer: COMMERCIAL

## 2020-10-28 PROCEDURE — 99211 OFF/OP EST MAY X REQ PHY/QHP: CPT | Performed by: NURSE PRACTITIONER

## 2020-10-28 NOTE — PATIENT INSTRUCTIONS
You have received a viral test for COVID-19. Below is education on quarantine per the CDC guidelines. For any symptoms, seek care from your PCP, call [phone] to establish care with a doctor, or go directly to an urgent care or the emergency room. Test results will take 2-7 days and will be sent to you in your [employer] account. If you test positive, you will be contacted via phone. If you test negative, the ONLY communication will be through [address] GO TO [email or web address] AND SIGN UP FOR [employer]  (LOWER LEFT OF THE HOME PAGE)  No test is 100%. If you have symptoms, you should follow the guidance of quarantine as previously stated. You can still be contagious if you have symptoms. Your [facility] will reach out to you if you have a positive result. They will provide you with a return to work date and note. If you were tested for a pre-op, then you should remain in quarantine until your procedure. How do I know if I need to be in quarantine? If you live in a community where COVID-19 is or might be spreading (currently, that is virtually everywhere in the [place])  Be alert for symptoms. Watch for fever, cough, shortness of breath, or other symptoms of COVID-19.  Take your temperature if symptoms develop.  Practice social distancing. Maintain 6 feet of distance from others and stay out of crowded places.  Follow CDC guidance if symptoms develop. If you feel healthy but:   Recently had close contact with a person with COVID-19 you need to Quarantine:   Stay home until 14 days after your last exposure.  Check your temperature twice a day and watch for symptoms of COVID-19.  If possible, stay away from people who are at higher-risk for getting very sick from COVID-19.   Stay Home and Monitor Your Health if you:   Have been diagnosed with COVID-19, or   Are waiting for test results, or   Have cough, fever, or shortness of breath, or symptoms of COVID-19      When You Can

## 2020-10-28 NOTE — PROGRESS NOTES
Fareed Ivy received a viral test for COVID-19. They were educated on isolation and quarantine as appropriate. For any symptoms, they were directed to seek care from their PCP, given contact information to establish with a doctor, directed to an urgent care or the emergency room.

## 2020-10-29 LAB — SARS-COV-2, NAA: DETECTED

## 2020-10-30 ENCOUNTER — TELEPHONE (OUTPATIENT)
Dept: INTERNAL MEDICINE CLINIC | Age: 55
End: 2020-10-30

## 2020-10-30 NOTE — TELEPHONE ENCOUNTER
Patient reports mild symptoms only. Denies fever or sob. Managing symptoms with OTC treatment. Advised on quarantine guidelines per the CDC. Reinforced red flag symptoms.

## 2020-10-30 NOTE — TELEPHONE ENCOUNTER
----- Message from Heidi Arroyo sent at 10/30/2020  9:59 AM EDT -----  Subject: Message to Provider    QUESTIONS  Information for Provider? Patient tested positive for COVID   has some specific questions about how long to be off work and what next   steps are. Patient tested on 10/28 results came 10/29.   ---------------------------------------------------------------------------  --------------  [address]  What is the best way for the office to contact you? OK to leave message on   voicemail  Preferred Call Back Phone Number? [phone]  ---------------------------------------------------------------------------  --------------  SCRIPT ANSWERS  Relationship to Patient?  Self

## 2020-11-12 ENCOUNTER — VIRTUAL VISIT (OUTPATIENT)
Dept: RHEUMATOLOGY | Age: 55
End: 2020-11-12

## 2020-11-12 ENCOUNTER — TELEPHONE (OUTPATIENT)
Dept: INTERNAL MEDICINE CLINIC | Age: 55
End: 2020-11-12

## 2020-11-12 PROCEDURE — 99214 OFFICE O/P EST MOD 30 MIN: CPT | Performed by: INTERNAL MEDICINE

## 2020-11-12 RX ORDER — SULFASALAZINE 500 MG/1
500 TABLET ORAL 2 TIMES DAILY
Qty: 60 TABLET | Refills: 1 | Status: SHIPPED | OUTPATIENT
Start: 2020-11-12 | End: 2020-12-31 | Stop reason: SDUPTHER

## 2020-11-12 NOTE — PROGRESS NOTES
TELEHEALTH EVALUATION -- Audio/Visual (During [facility])    Pursuant to the emergency declaration under the [facility] [place], [facility] waiver authority and the [employer] and [employer] Act, this Virtual  Visit was conducted, with patient's consent, to reduce the patient's risk of exposure to COVID-19 and provide continuity of care for an established patient. Services were provided through a video synchronous discussion virtually to substitute for in-person clinic visit. The visit was conducted from the patient's home and my office. RHEUMATOLOGY TELEHEALTH VIDEO VISIT NOTE    SUBJECTIVE:    Background:   Cata Bee (:  1965) has requested an audio/video evaluation for the following rheumatologic concern(s):    Cata Bee is a 54 y.o. female w/ prior Dx of long standing PsO, PsA and fibromyalgia previously followed by rheumatologist (Dr. Manish Donaldson) in [place].   PMHx pertinent for chronic back pain from DDD, spondylosis and facet arthropathy of L-spine (follows w/ Dr. Álvaro Ulloa), KARTHIK noncompliant w/ CPAP, HTN, pre-diabetes, fatty liver, IBS, depression and anxiety.     Per prior rheumatologist, Dr. Aurelio Diaz note from 17: Dx of PsA on Humira \"was doing well initially on Humira, still flares, no synovitis\", Humira was switched to Stelara in 3/17.  PsA was noted to improve on Stelara per rheumatologist note from 17.     Reviewed  Dermatology note from 18, Dx of PsO w/ PsA \"previously on many biologics per Rheumatology\", exam at the time revealed \"palms w/ few pink thin scaling plaques\".      Current rheum meds:  SSZ at 500 mg BID: started in 3/20  Cymbalta 90 mg daily: prescribed by PCP  Elavil 30 mg QHS: prescribed by PCP  OTC Biotene products     Prior rheum meds:  Meloxicam provided short term pain relief but worsened her GERD, she thinks she took Celecoxib for a couple of yrs in her 30s  Celecoxib 100 mg BID: caused GERD  Medrol dose pack: significantly improves joint pain  MTX: per pt oral MTX caused thrush, she thinks this \"worked a little bit\"  SSZ: per pt caused thrush  Humira: per pt this was [employer]" but it stopped working after a few months  Stelara: per pt this worked mainly for her joints - she felt this worked but stopped after two months d/t insurance issues   Flexeril: ineffective for back pain  Gabapentin: does not remember response  Paxil, Effexor    Past medical/surgical history, medications and allergies are reviewed and updated as appropriate. Interval Hx:   Pt states her arthritis remains well controlled on SSZ. She denies any active joint pain, swelling or prolonged mornings stiffness. Pt reports infrequent episodes of arthritis flares. Skin remains clear. She denies any s/e on SSZ. Of note, pt tested positive for COVID-19 approximately 2 wks ago. Pt states she was exposed to her daughter. She reports body aches and loss of her sense of smell. She reports chronic dry mouth and dry eyes. She's using OTC Biotene products.      Rheumatologic ROS:  Constitutional: denies chronic fatigue, fever/chills, night sweats, unintentional weight loss  Integumentary: +chronic diffuse hair thinning, denies photosensitivity, rash, or Sx of Raynaud's phenomenon  Eyes: +dry eyes lately she feels \"they're irritating\" has not had any recent eye exam, denies redness or pain, visual disturbance, or floaters  Nose: denies nasal ulcers or recurrent sinusitis  Oral cavity: +dry mouth, denies oral ulcers  Cardiovascular: denies CP, palpitations, Hx of pericardial effusion or pericarditis  Respiratory: denies SOB, cough, hemoptysis, or pleurisy  Gastrointestinal: +chronic GERD refer to above HPI, denies chronic diarrhea or bloody stools  Musculoskeletal:  refer to above HPI        Allergies   Allergen Reactions    Morphine Other (See Comments)     Chest tightness       Past Medical History:        Diagnosis Date    Chronic depression     DDD (degenerative disc disease), lumbar     Fibromyalgia     HTN (hypertension)     KARTHIK (obstructive sleep apnea)     Psoriatic arthritis ([facility] [address]        Past Surgical History:        Procedure Laterality Date    BLADDER SUSPENSION  2004     SECTION  08/31/1987    x1    CHOLECYSTECTOMY  2000    ENDOMETRIAL ABLATION  2007    HYSTERECTOMY, TOTAL ABDOMINAL  2009    heavy menses    NASAL SEPTUM SURGERY  2005    NERVE SURGERY Bilateral 2019    BILATERAL L4-5 AND L5-S1 LUMBAR FACET INJECTIONS WITH FLUOROSCOPY performed by Thor Jett MD at [place] [address]         Medications:    Current Outpatient Medications   Medication Sig Dispense Refill    sulfaSALAzine (AZULFIDINE) 500 MG tablet Take 1 tablet by mouth 2 times daily 60 tablet 1    famotidine (PEPCID) 40 MG tablet Take 1 tablet by mouth every evening 30 tablet 3    sucralfate (CARAFATE) 1 GM tablet Take 1 tablet by mouth 4 times daily 120 tablet 0    DULoxetine (CYMBALTA) 30 MG extended release capsule Take 1 capsule by mouth daily Take with 60 mg Cymbalta 90 capsule 3    DULoxetine (CYMBALTA) 60 MG extended release capsule Take 1 capsule by mouth daily Take with 30 mg 90 capsule 3    pantoprazole (PROTONIX) 40 MG tablet Take 1 tablet by mouth daily 90 tablet 3    metoprolol tartrate (LOPRESSOR) 50 MG tablet Take 1 tablet by mouth 2 times daily 180 tablet 3    amitriptyline (ELAVIL) 10 MG tablet Take 3 tablets by mouth nightly 90 tablet 3    ondansetron (ZOFRAN ODT) 8 MG TBDP disintegrating tablet Place 1 tablet under the tongue every 8 hours as needed for Nausea, Vomiting or Other (migraines) 20 tablet 1    Cholecalciferol (VITAMIN D3) 2000 units CAPS Take by mouth      clonazePAM (KLONOPIN) 0.5 MG tablet 1/2-1 po q 8 hours prn severe anxiety 30 tablet 3    SUMAtriptan (IMITREX) 100 MG tablet Take 1 tablet by mouth once as needed for Migraine May repeat once after 2 hours if needed. No more than 2 per 24 hour period. 9 tablet 1     No current facility-administered medications for this visit. OBJECTIVE:    PHYSICAL EXAMINATION:  Due to this being a TeleHealth encounter, evaluation of the following organ systems is limited: Vitals/Constitutional/EENT/Resp/CV/GI//MS/Neuro/Skin/Heme-Lymph-Imm. Constitutional: Normal  Level of distress: Normal  Overall appearance: Normal  Psychiatric: Normal, Appropriate mood and affect. Good insight, good judgment. Orientation: Oriented to time, place, person and situation. Eyes: Vision grossly intact, no visible conjunctival injection  Hearing: Grossly intact  Musculoskeletal: (exam limited by TeleHealth encounter), no visible synovitis, full fist formation    DATA:  Labs:   I personally reviewed interval labs and discussed w/ the pt in detail which showed:    Lab Results   Component Value Date    WBC 4.1 09/25/2020    HGB 13.0 09/25/2020    HCT 39.9 09/25/2020    MCV 94.2 09/25/2020     09/25/2020    LYMPHOPCT 28.8 09/25/2020    RBC 4.24 09/25/2020    MCH 30.8 09/25/2020    MCHC 32.7 09/25/2020    RDW 14.6 09/25/2020     Lab Results   Component Value Date     09/25/2020    K 4.4 09/25/2020     09/25/2020    CO2 25 09/25/2020    BUN 14 09/25/2020    CREATININE 0.8 09/25/2020    GLUCOSE 91 09/25/2020    CALCIUM 9.4 09/25/2020    PROT 6.1 (L) 09/25/2020    LABALBU 4.4 09/25/2020    BILITOT 0.4 09/25/2020    ALKPHOS 89 09/25/2020    AST 13 (L) 09/25/2020    ALT 14 09/25/2020    LABGLOM >60 09/25/2020    GFRAA >60 09/25/2020    AGRATIO 2.6 (H) 09/25/2020    GLOB 1.7 09/25/2020       Vitamin D 25 hydroxy (7/10/19): 37.1  TSH (7/10/19) wnl  HgbA1c (7/10/19) 5.9     Labs from 7/24/19:  Negative RF, CCP, HLA B27  Negative JULIANO, SSA, SSB  C3 and C4 wnl  Negative hepatitis B and C serologies, Quantiferon TB, HIV screen     Negative JULIANO, SSA, SSB (3/4/20)     CRP and ESR wnl (7/24/19, 3/4/20)    Imaging:  I personally reviewed interval imaging and discussed w/ the pt in detail which included:    MRI L-spine (7/10/19): Mild neural foraminal narrowing.  No significant spinal canal stenosis.       X-rays (7/24/19):  R hand:  No fracture or dislocation. No underlying degenerative changes. Joint spaces are normal with no significant osteophytes. No inflammatory changes. No erosions. No soft tissue swelling.      L hand:  No fracture or dislocation. No underlying degenerative changes. Joint spaces are normal with no significant osteophytes.  No inflammatory changes. No erosions. No soft tissue swelling.      R knee:  No fracture or dislocation. No underlying degenerative changes. Joint spaces are normal with no significant osteophytes.  No inflammatory changes. No erosions. No soft tissue swelling.      L knee:  No fracture or dislocation. No underlying degenerative changes. Joint spaces are normal with no significant osteophytes. No inflammatory changes. No erosions. No soft tissue swelling.      SI joints:  Normal, symmetric appearance of the b/l SI joints.  No widening or significant sclerosis.  No erosions are appreciated.  Pelvic bones are otherwise unremarkable.  No significant soft tissue findings.      I independently reviewed above X-rays - mild OA changes in the b/l PIP joints otherwise well preserved joint spaces, no significant juxta-articular osteopenia, no erosive changes seen. Garcia Rom w/ mild medial joint space narrowing, no chondrocalcinosis.  SI joints patent w/o erosive changes, some sclerosis.     MRI R hand (9/3/19)  No erosive changes or synovitis.      MRI pelvis (9/3/19): No evidence of active sacroiliitis. Above results were discussed w/ the pt in detail during today's visit. ASSESSMENT/PLAN:   Psoriatic disease well controlled. Cont current dose of SSZ, ordered safety labs to be checked in 4-6 wks. Discussed indications to hold SSZ in the future. Discussed her sicca.   Pt declined trial of Cevimeline/Pilocarpine. She will cont w/ OTC Biotene products. Also recommended Prevident tooth paste. Fibromyalgia well controlled on Duloxetine and Elavil. Wilian Suarez was seen today for follow-up. Diagnoses and all orders for this visit:    Psoriatic arthritis ([address]  -     sulfaSALAzine (AZULFIDINE) 500 MG tablet; Take 1 tablet by mouth 2 times daily    High risk medication use  -     ALT; Future  -     AST; Future  -     CBC Auto Differential; Future  -     Creatinine, Serum; Future    Psoriasis  -     sulfaSALAzine (AZULFIDINE) 500 MG tablet;  Take 1 tablet by mouth 2 times daily    Sicca complex ([address]          Orders Placed This Encounter   Procedures    ALT     Standing Status:   Future     Standing Expiration Date:   5/12/2021    AST     Standing Status:   Future     Standing Expiration Date:   5/12/2021    CBC Auto Differential     Standing Status:   Future     Standing Expiration Date:   5/12/2021    Creatinine, Serum     Standing Status:   Future     Standing Expiration Date:   5/12/2021     Outpatient Encounter Medications as of 11/12/2020   Medication Sig Dispense Refill    sulfaSALAzine (AZULFIDINE) 500 MG tablet Take 1 tablet by mouth 2 times daily 60 tablet 1    famotidine (PEPCID) 40 MG tablet Take 1 tablet by mouth every evening 30 tablet 3    sucralfate (CARAFATE) 1 GM tablet Take 1 tablet by mouth 4 times daily 120 tablet 0    DULoxetine (CYMBALTA) 30 MG extended release capsule Take 1 capsule by mouth daily Take with 60 mg Cymbalta 90 capsule 3    DULoxetine (CYMBALTA) 60 MG extended release capsule Take 1 capsule by mouth daily Take with 30 mg 90 capsule 3    pantoprazole (PROTONIX) 40 MG tablet Take 1 tablet by mouth daily 90 tablet 3    metoprolol tartrate (LOPRESSOR) 50 MG tablet Take 1 tablet by mouth 2 times daily 180 tablet 3    amitriptyline (ELAVIL) 10 MG tablet Take 3 tablets by mouth nightly 90 tablet 3    ondansetron (ZOFRAN ODT) 8 MG TBDP disintegrating tablet Place 1 tablet under the tongue every 8 hours as needed for Nausea, Vomiting or Other (migraines) 20 tablet 1    Cholecalciferol (VITAMIN D3) 2000 units CAPS Take by mouth      [DISCONTINUED] sulfaSALAzine (AZULFIDINE) 500 MG tablet Take 1 tablet by mouth 2 times daily 60 tablet 2    clonazePAM (KLONOPIN) 0.5 MG tablet 1/2-1 po q 8 hours prn severe anxiety 30 tablet 3    SUMAtriptan (IMITREX) 100 MG tablet Take 1 tablet by mouth once as needed for Migraine May repeat once after 2 hours if needed. No more than 2 per 24 hour period. 9 tablet 1     No facility-administered encounter medications on file as of 11/12/2020. Return in about 6 months (around 5/12/2021) for lab result discussion and treatment plan, medication monitoring. Sooner if she develops active disease. The risks and benefits of my recommendations, as well as other treatment options, benefits and side effects were discussed with the patient today. Questions were answered. --Swathi Martinez MD on 11/12/2020 at 9:41 AM    An electronic signature was used to authenticate this note.

## 2020-11-12 NOTE — TELEPHONE ENCOUNTER
Pt called states two week ago had a COVID test done and she received a letter stating she was past her quarantine period.   She had an apt today with Dr Edward Harrison and was changed to a virtual   Her apt.is at 10:40 tomorrow with you and I'm hoping it was Ok to change to a virtual.  If not please give her a call back in morning to change back to a office visit

## 2020-11-13 ENCOUNTER — VIRTUAL VISIT (OUTPATIENT)
Dept: INTERNAL MEDICINE CLINIC | Age: 55
End: 2020-11-13
Payer: COMMERCIAL

## 2020-11-13 PROBLEM — Z86.16 HISTORY OF 2019 NOVEL CORONAVIRUS DISEASE (COVID-19): Status: ACTIVE | Noted: 2020-11-13

## 2020-11-13 PROCEDURE — 99214 OFFICE O/P EST MOD 30 MIN: CPT | Performed by: INTERNAL MEDICINE

## 2020-11-13 RX ORDER — BUSPIRONE HYDROCHLORIDE 5 MG/1
5 TABLET ORAL 3 TIMES DAILY PRN
Qty: 90 TABLET | Refills: 0 | Status: SHIPPED | OUTPATIENT
Start: 2020-11-13 | End: 2020-12-31 | Stop reason: SDUPTHER

## 2020-11-13 RX ORDER — AMITRIPTYLINE HYDROCHLORIDE 50 MG/1
50 TABLET, FILM COATED ORAL NIGHTLY
Qty: 30 TABLET | Refills: 1 | Status: SHIPPED | OUTPATIENT
Start: 2020-11-13 | End: 2020-12-15 | Stop reason: SDUPTHER

## 2020-11-13 NOTE — PROGRESS NOTES
TELEHEALTH EVALUATION -- Audio/Visual (During [facility]-88 public health emergency)    HPI    Seen virtually today for f/u. Had COVID in late-October. Has been back to work this week. Still with a slight cough and lower oxygen levels (95-96%). Had to cancel testing for GI until has negative COVID test. Wondering when she should re-test.     Has been having a lot of anxiety recently- had to tell her daughter, during , that she had to fire her and hire back a previously employee with more experience. Daughter has since found a new job. Does need to get back in touch with Dr. Ana Rosa Dumont. Employer called her while she was ill. Is not sleeping well d/t anxiety. Has needed to take 1/2 Klonopin at work a few times and at night to help her sleep. Is worried about her blood sugars. Has stopped doing keto over worries about her cholesterol. Is due for labs in March.      Past Medical History:   Diagnosis Date    Chronic depression     DDD (degenerative disc disease), lumbar     Fibromyalgia     HTN (hypertension)     KARTHIK (obstructive sleep apnea)     Psoriatic arthritis ([facility] [address]        Past Surgical History:   Procedure Laterality Date    BLADDER SUSPENSION  2004     SECTION  08/31/1987    x1    CHOLECYSTECTOMY  2000    ENDOMETRIAL ABLATION  2007    HYSTERECTOMY, TOTAL ABDOMINAL  2009    heavy menses    NASAL SEPTUM SURGERY  2005    NERVE SURGERY Bilateral 2019    BILATERAL L4-5 AND L5-S1 LUMBAR FACET INJECTIONS WITH FLUOROSCOPY performed by Charlotte Shea MD at [place] [address]         Family History   Problem Relation Age of Onset    Cancer Mother         gallbladder cancer    High Blood Pressure Mother     Alcohol Abuse Mother     High Blood Pressure Father     Cancer Sister         small cell lung cancer (smoker)    Heart Disease Maternal Grandmother     Heart Disease Maternal Grandfather     Stroke Maternal Grandfather     Heart Disease Paternal Grandmother  Heart Disease Paternal Grandfather     No Known Problems Half-Brother     No Known Problems Half-Sister     Other Half-Sibling         skin cancer    Other Half-Sister         blood clots    Substance Abuse Daughter 32        in remission        Allergies   Allergen Reactions    Morphine Other (See Comments)     Chest tightness       Current Outpatient Medications   Medication Sig Dispense Refill    amitriptyline (ELAVIL) 50 MG tablet Take 1 tablet by mouth nightly 30 tablet 1    busPIRone (BUSPAR) 5 MG tablet Take 1 tablet by mouth 3 times daily as needed (anxiety) 90 tablet 0    sulfaSALAzine (AZULFIDINE) 500 MG tablet Take 1 tablet by mouth 2 times daily 60 tablet 1    famotidine (PEPCID) 40 MG tablet Take 1 tablet by mouth every evening 30 tablet 3    sucralfate (CARAFATE) 1 GM tablet Take 1 tablet by mouth 4 times daily 120 tablet 0    DULoxetine (CYMBALTA) 30 MG extended release capsule Take 1 capsule by mouth daily Take with 60 mg Cymbalta 90 capsule 3    DULoxetine (CYMBALTA) 60 MG extended release capsule Take 1 capsule by mouth daily Take with 30 mg 90 capsule 3    pantoprazole (PROTONIX) 40 MG tablet Take 1 tablet by mouth daily 90 tablet 3    metoprolol tartrate (LOPRESSOR) 50 MG tablet Take 1 tablet by mouth 2 times daily 180 tablet 3    ondansetron (ZOFRAN ODT) 8 MG TBDP disintegrating tablet Place 1 tablet under the tongue every 8 hours as needed for Nausea, Vomiting or Other (migraines) 20 tablet 1    Cholecalciferol (VITAMIN D3) 2000 units CAPS Take by mouth      clonazePAM (KLONOPIN) 0.5 MG tablet 1/2-1 po q 8 hours prn severe anxiety 30 tablet 3    SUMAtriptan (IMITREX) 100 MG tablet Take 1 tablet by mouth once as needed for Migraine May repeat once after 2 hours if needed. No more than 2 per 24 hour period. 9 tablet 1     No current facility-administered medications for this visit.         Review of Systems -   ROS:  Constitutional negative for fevers, fatigue, unexpected weight changes  HEENT: negative for congestion, ST, ear pain  Eyes: no discharge, no change in vision  Endo: no low blood sugars, no polyurea, polydipsia,   Heart: no chest pain, no palpitations, no LE edema  Pulm: no SOB, wheezing,positive for cough  GI: no changes in bowel function (no constipation, no diarrhea)  : no dysuria  Psych: no depression, positive for anxiety, no suicidal thoughts        PHYSICAL EXAMINATION:  [ INSTRUCTIONS:  \"[x]\" Indicates a positive item  \"[]\" Indicates a negative item  -- DELETE ALL ITEMS NOT EXAMINED]  Vital Signs: (As obtained by patient/caregiver or practitioner observation)        Constitutional:  [x] Appears well-developed and well-nourished [x] No apparent distress     [] Abnormal-   Mental status  [x] Alert and awake  [x] Oriented to person/place/time [x]Able to follow commands      Eyes:  EOM     [x]  Normal  [] Abnormal-  Sclera   [x]  Normal  [] Abnormal -         Discharge  [x]  None visible  [] Abnormal -    HENT:   [x] Normocephalic, atraumatic.   [] Abnormal   [x] Mouth/Throat: Mucous membranes are moist.  [x] Normal hearing    External Ears [x] Normal  [] Abnormal-     Neck: [x] No visualized mass     Pulmonary/Chest:  [x] Respiratory effort normal.  [x] No visualized signs of difficulty breathing or respiratory distress         [x] Abnormal- mild cough     Musculoskeletal: [x] Normal gait with no signs of ataxia          [x] Normal range of motion of neck         [] Abnormal-     Neurological:         [x] No Facial Asymmetry (Cranial nerve 7 motor function) (limited exam to video visit)           [x] No gaze palsy         [] Abnormal-         Skin:  [x] No significant exanthematous lesions or discoloration noted on facial skin          [] Abnormal-            Psychiatric:  [x] Normal Affect [x] Normal Mood        [] Abnormal-     Other pertinent observable physical exam findings-     Due to this being a TeleHealth encounter, evaluation of the following organ systems is limited: Vitals/Constitutional/EENT/Resp/CV/GI//MS/Neuro/Skin/Heme-Lymph-Imm. Assessment and Plan  Anxiety  Increased recently. Encouraged to reschedule with Dr. Linnette Holden (had to cancel most recent appointment due to [place]). Add BuSpar 5 mg 3 times daily as needed. Continue Cymbalta 90 mg daily. Increase amitriptyline to 50 mg nightly. Migraine with aura and without status migrainosus, not intractable  Doing well on amitriptyline. Amitriptyline dose has been increased from 30 to 50 mg to help with sleep. Mild hyperlipidemia  Last checked in September and is under fair control. Repeat lipid panel in March. Pre-diabetes  Hemoglobin A1c in September 2020 was 6.0%. Encouraged to follow a low-carb diet. Repeat labs in March. History of 2019 novel coronavirus disease (COVID-19)  Patient tested positive for COVID-19 on October 28, 2020. She is symptomatically recovered and is back at work. Discussed donating convalescent serum to [facility]. Patient will call [facility]. In addition, she does need to have a negative Covid test before she can proceed with her GI tests. Advised that some patients may test positive for COVID-19 up to 6 weeks following infection and recommend waiting until early January to retest.        Pursuant to the emergency declaration under the [facility] [place] waiver authority and the [address] and [employer] Act, this Virtual  Visit was conducted, with patient's consent, to reduce the patient's risk of exposure to COVID-19 and provide continuity of care for an established patient. Services were provided through a video synchronous discussion virtually to substitute for in-person clinic visit.

## 2020-11-13 NOTE — PATIENT INSTRUCTIONS
Check with Nathaly regarding donating convalescent serum for COVID. Repeat COVID test in early-January so that you may have your GI testing. The number to call to make the appointment is [phone]  Patient Education        Learning About Low-Carbohydrate Foods  What foods are low in carbohydrate? The foods you eat contain nutrients, such as vitamins and minerals. Carbohydrate is a nutrient. Your body needs the right amount to stay healthy and work as it should. You can use the list below to help you make choices about which foods to eat. Some foods that are lower in carbohydrate include:  Dairy and dairy alternatives  · Cheese  · Cottage cheese  · Cream cheese  · Nut milk (unsweetened)  · Soy milk (unsweetened)  · Yogurt (Greek, plain)  Fruits  · Avocado  · [employer] and other protein foods  · Almonds  · Beef  · Chicken  · Cod  · Eggs  · Halibut  · Peanut butter and other nut butters  · Pistachios  · Pork  · Pumpkin seeds  · Tofu  · Trout  · [facility]  · [place] ([facility])  · Walnuts  Vegetables  · Broccoli  · Carrots  · Cauliflower  · Green beans  · Mushrooms  · Peppers  · Salad greens  · Spinach  · Tomatoes  Work with your doctor to find out how much of this nutrient you need. Depending on your health, you may need more or less of it in your diet. Where can you learn more? Go to https://datatrackerpepiceweb.healthJule [email or web address] org and sign in to your Zlio account. Enter [phone] in the [facility] box to learn more about \"Learning About Low-Carbohydrate Foods. \"     If you do not have an account, please click on the \"Sign Up Now\" link. Current as of: August 22, 2019               Content Version: 12.6  © 7605-7615 LocalView, Incorporated. Care instructions adapted under license by [facility] ([facility]). If you have questions about a medical condition or this instruction, always ask your healthcare professional. [address] any warranty or liability for your use of this information. Patient Education        Learning About Low-Carbohydrate Diets  What is a low-carbohydrate diet? A low-carbohydrate (or \"low-carb\") diet limits foods and drinks that have carbohydrates. This includes grains, fruits, milk and yogurt, and starchy vegetables like potatoes, beans, and corn. It also avoids foods and drinks that have added sugar. Instead, low-carb diets include foods that are high in protein and fat. Why might you follow a low-carb diet? Low-carb diets may be used for a variety of reasons, such as for weight loss. People who have diabetes may use a low-carb diet to help manage their blood sugar levels. What should you do before you start the diet? Talk to your doctor before you try any diet. This is even more important if you have health problems like kidney disease, heart disease, or diabetes. Your doctor may suggest that you meet with a registered dietitian. A dietitian can help you make an eating plan that works for you. What foods do you eat on a low-carb diet? On a low-carb diet, you choose foods that are high in protein and fat. Examples of these are:  · Meat, poultry, and fish. · Eggs. · Nuts, such as walnuts, pecans, almonds, and peanuts. · Peanut butter and other nut butters. · Tofu. · Avocado. · Tana Stare. · Non-starchy vegetables like broccoli, cauliflower, green beans, mushrooms, peppers, lettuce, and spinach. · Unsweetened non-dairy milks like almond milk and coconut milk. · Cheese, cottage cheese, and cream cheese. Current as of: August 22, 2019               Content Version: 12.6  © 0859-8526 Shapeways, Incorporated. Care instructions adapted under license by [facility] ([facility]). If you have questions about a medical condition or this instruction, always ask your healthcare professional. [address]  any warranty or liability for your use of this information.          Patient Education        Learning About the Mediterranean Diet  What is the Mediterranean diet? The Mediterranean diet is a style of eating rather than a diet plan. It features foods eaten in [place], [place], [place] and [place], and other countries along the [facility]. It emphasizes eating foods like fish, fruits, vegetables, beans, high-fiber breads and whole grains, nuts, and olive oil. This style of eating includes limited red meat, cheese, and sweets. Why choose the Mediterranean diet? A Mediterranean-style diet may improve heart health. It contains more fat than other heart-healthy diets. But the fats are mainly from nuts, unsaturated oils (such as fish oils and olive oil), and certain nut or seed oils (such as canola, soybean, or flaxseed oil). These fats may help protect the heart and blood vessels. How can you get started on the Mediterranean diet? Here are some things you can do to switch to a more Mediterranean way of eating. What to eat  · Eat a variety of fruits and vegetables each day, such as grapes, blueberries, tomatoes, broccoli, peppers, figs, olives, spinach, eggplant, beans, lentils, and chickpeas. · Eat a variety of whole-grain foods each day, such as oats, brown rice, and whole wheat bread, pasta, and couscous. · Eat fish at least 2 times a week. Try tuna, salmon, mackerel, lake trout, herring, or sardines. · Eat moderate amounts of low-fat dairy products, such as milk, cheese, or yogurt. · Eat moderate amounts of poultry and eggs. · Choose healthy (unsaturated) fats, such as nuts, olive oil, and certain nut or seed oils like canola, soybean, and flaxseed. · Limit unhealthy (saturated) fats, such as butter, palm oil, and coconut oil. And limit fats found in animal products, such as meat and dairy products made with whole milk. Try to eat red meat only a few times a month in very small amounts. · Limit sweets and desserts to only a few times a week. This includes sugar-sweetened drinks like soda.   The Mediterranean diet may also include red wine with your meal--1 glass each day for women and up to 2 glasses a day for men. Tips for eating at home  · Use herbs, spices, garlic, lemon zest, and citrus juice instead of salt to add flavor to foods. · Add avocado slices to your sandwich instead of moreno. · Have fish for lunch or dinner instead of red meat. Brush the fish with olive oil, and broil or grill it. · Sprinkle your salad with seeds or nuts instead of cheese. · Cook with olive or canola oil instead of butter or oils that are high in saturated fat. · Switch from 2% milk or whole milk to 1% or fat-free milk. · Dip raw vegetables in a vinaigrette dressing or hummus instead of dips made from mayonnaise or sour cream.  · Have a piece of fruit for dessert instead of a piece of cake. Try baked apples, or have some dried fruit. Tips for eating out  · Try broiled, grilled, baked, or poached fish instead of having it fried or breaded. · Ask your  to have your meals prepared with olive oil instead of butter. · Order dishes made with marinara sauce or sauces made from olive oil. Avoid sauces made from cream or mayonnaise. · Choose whole-grain breads, whole wheat pasta and pizza crust, brown rice, beans, and lentils. · Cut back on butter or margarine on bread. Instead, you can dip your bread in a small amount of olive oil. · Ask for a side salad or grilled vegetables instead of french fries or chips. Where can you learn more? Go to https://Halo BeveragesdevinPley.Infotrieve. org and sign in to your [employer] account. Enter [phone] in the [facility] box to learn more about \"Learning About the Mediterranean Diet. \"     If you do not have an account, please click on the \"Sign Up Now\" link. Current as of: August 22, 2019               Content Version: 12.6  © 5726-7191 Sweepery, Incorporated. Care instructions adapted under license by [facility] ([facility]).  If you have questions about a medical condition or this instruction, always ask your healthcare professional. [address] any warranty or liability for your use of this information. Patient Education        Prediabetes: Care Instructions  Overview     Prediabetes is a warning sign that you're at risk for getting type 2 diabetes. It means that your blood sugar is higher than it should be. But it's not high enough to be diabetes. The food you eat naturally turns into sugar. Your body uses the sugar for energy. Normally, an organ called the pancreas makes insulin. And insulin allows the sugar in your blood to get into your body's cells. But sometimes the body can't use insulin the right way. So the sugar stays in your blood instead. This is called insulin resistance. The buildup of sugar in your blood means you have prediabetes. The good news is that you may be able to prevent or delay diabetes. Making small lifestyle changes, like getting active and changing your eating habits, may help you get your blood sugar back to normal. You can work with your doctor to make a treatment plan. Follow-up care is a key part of your treatment and safety. Be sure to make and go to all appointments, and call your doctor if you are having problems. It's also a good idea to know your test results and keep a list of the medicines you take. How can you care for yourself at home? · Watch your weight. A healthy weight helps your body use insulin properly. · Limit the amount of calories, sweets, and unhealthy fat you eat. Ask your doctor if you should see a dietitian. A registered dietitian can help you create meal plans that fit your lifestyle. · Get at least 30 minutes of exercise on most days of the week. Exercise helps control your blood sugar. It also helps you maintain a healthy weight. Walking is a good choice. You also may want to do other activities, such as running, swimming, cycling, or playing tennis or team sports. · Do not smoke. Smoking can make prediabetes worse.  If you need help quitting, talk to your doctor about stop-smoking programs and medicines. These can increase your chances of quitting for good. · If your doctor prescribed medicines, take them exactly as prescribed. Call your doctor if you think you are having a problem with your medicine. You will get more details on the specific medicines your doctor prescribes. When should you call for help? Watch closely for changes in your health, and be sure to contact your doctor if:    · You have any symptoms of diabetes. These may include:  ? Being thirsty more often. ? Urinating more. ? Being hungrier. ? Losing weight. ? Being very tired. ? Having blurry vision.     · You have a wound that will not heal.     · You have an infection that will not go away.     · You have problems with your blood pressure.     · You want more information about diabetes and how you can keep from getting it. Where can you learn more? Go to https://Sumo Logicpejoanieeweb.Modlar. org and sign in to your Newgistics account. Enter I222 in the [employer] box to learn more about \"Prediabetes: Care Instructions. \"     If you do not have an account, please click on the \"Sign Up Now\" link. Current as of: December 20, 2019               Content Version: 12.6  © 5825-4604 JA[employer]CH[employer], Incorporated. Care instructions adapted under license by [facility] ([facility]). If you have questions about a medical condition or this instruction, always ask your healthcare professional. [address] any warranty or liability for your use of this information.

## 2020-11-13 NOTE — ASSESSMENT & PLAN NOTE
Patient tested positive for COVID-19 on October 28, 2020. She is symptomatically recovered and is back at work. Discussed donating convalescent serum to [facility]. Patient will call [facility]. In addition, she does need to have a negative Covid test before she can proceed with her GI tests.   Advised that some patients may test positive for COVID-19 up to 6 weeks following infection and recommend waiting until early January to retest.

## 2020-11-13 NOTE — ASSESSMENT & PLAN NOTE
Doing well on amitriptyline. Amitriptyline dose has been increased from 30 to 50 mg to help with sleep.

## 2020-11-13 NOTE — ASSESSMENT & PLAN NOTE
Hemoglobin A1c in September 2020 was 6.0%. Encouraged to follow a low-carb diet. Repeat labs in March.

## 2020-12-11 ENCOUNTER — HOSPITAL ENCOUNTER (OUTPATIENT)
Age: 55
Discharge: HOME | End: 2020-12-11
Payer: COMMERCIAL

## 2020-12-11 ENCOUNTER — PATIENT MESSAGE (OUTPATIENT)
Dept: RHEUMATOLOGY | Age: 55
End: 2020-12-11

## 2020-12-11 ENCOUNTER — HOSPITAL ENCOUNTER (OUTPATIENT)
Dept: GENERAL RADIOLOGY | Age: 55
Discharge: HOME | End: 2020-12-11
Payer: COMMERCIAL

## 2020-12-11 LAB
ALBUMIN SERPL-MCNC: 3.9 G/DL (ref 3.4–5)
ALP BLD-CCNC: 71 U/L (ref 40–129)
ALT SERPL-CCNC: 17 U/L (ref 10–40)
AST SERPL-CCNC: 19 U/L (ref 15–37)
BILIRUB SERPL-MCNC: <0.2 MG/DL (ref 0–1)
BILIRUBIN DIRECT: <0.2 MG/DL (ref 0–0.3)
BILIRUBIN, INDIRECT: ABNORMAL MG/DL (ref 0–1)
TOTAL PROTEIN: 5.8 G/DL (ref 6.4–8.2)

## 2020-12-11 PROCEDURE — 74240 X-RAY XM UPR GI TRC 1CNTRST: CPT

## 2020-12-11 PROCEDURE — 80076 HEPATIC FUNCTION PANEL: CPT

## 2020-12-11 PROCEDURE — 36415 COLL VENOUS BLD VENIPUNCTURE: CPT

## 2020-12-11 RX ORDER — CEVIMELINE HYDROCHLORIDE 30 MG/1
30 CAPSULE ORAL 3 TIMES DAILY
Qty: 90 CAPSULE | Refills: 2 | Status: SHIPPED | OUTPATIENT
Start: 2020-12-11 | End: 2021-01-10

## 2020-12-15 ENCOUNTER — TELEPHONE (OUTPATIENT)
Dept: INTERNAL MEDICINE CLINIC | Age: 55
End: 2020-12-15

## 2020-12-15 RX ORDER — AMITRIPTYLINE HYDROCHLORIDE 50 MG/1
50 TABLET, FILM COATED ORAL NIGHTLY
Qty: 90 TABLET | Refills: 3 | Status: SHIPPED | OUTPATIENT
Start: 2020-12-15 | End: 2021-04-14

## 2020-12-15 NOTE — TELEPHONE ENCOUNTER
Patient asked that her default pharmacy be changed to [address] [place] [address]. She is requesting a refill of amitriptyline (ELAVIL) 50 MG tablet sent to [facility] for a 90 day supply.

## 2020-12-22 ENCOUNTER — HOSPITAL ENCOUNTER (OUTPATIENT)
Dept: MRI IMAGING | Age: 55
Discharge: HOME | End: 2020-12-22
Payer: COMMERCIAL

## 2020-12-22 PROCEDURE — 74181 MRI ABDOMEN W/O CONTRAST: CPT

## 2020-12-28 ENCOUNTER — VIRTUAL VISIT (OUTPATIENT)
Dept: PSYCHOLOGY | Age: 55
End: 2020-12-28
Payer: COMMERCIAL

## 2020-12-28 DIAGNOSIS — F33.1 MODERATE EPISODE OF RECURRENT MAJOR DEPRESSIVE DISORDER (HCC): Primary | ICD-10-CM

## 2020-12-28 DIAGNOSIS — F41.1 GAD (GENERALIZED ANXIETY DISORDER): ICD-10-CM

## 2020-12-28 PROCEDURE — 90832 PSYTX W PT 30 MINUTES: CPT | Performed by: PSYCHOLOGIST

## 2020-12-28 NOTE — PROGRESS NOTES
TELEHEALTH VISIT -- Audio Only (During CDVIA-76 public health emergency)  }  Pursuant to the emergency declaration under the [address] [place] [address], [facility] waiver authority and the [employer] and [employer] Act, this phone call was conducted, with patient's consent, to reduce the patient's risk of exposure to COVID-19 and provide continuity of care for an established patient. Services were provided through a phone call discussion to substitute for in-person clinic visit. Pt gave verbal informed consent to participate in telehealth services. Consent:  She and/or health care decision maker is aware that that she may receive a bill for this telephone service, depending on her insurance coverage, and has provided verbal consent to proceed: Yes    Conducted a risk-benefit analysis and determined that the patient's presenting problems are consistent with the use of telepsychology. Determined that the patient has sufficient knowledge and skills in the use of technology enabling them to adequately benefit from telepsychology. It was determined that this patient was able to be properly treated without an in-person session. Patient verified that they were currently located at the [facility] address that was provided during registration. Verified the following information:  Patient's identification: Yes  Patient location: [address] [place] [address] Drive  Patient's call back number: [phone]   Patient's emergency contact's name and number, as well as permission to contact them if needed: Extended Emergency Contact Information  Primary Emergency Contact: [address] Phone: [phone]  Relation: Brother/Sister     Provider location: [place], [place]    I affirm this is an episode with a patient who has not had a related appointment within my department in the past 7 days or scheduled within the next 24 hours.         Behavioral Health Consultation Pao Burnham, Ph.D.  Psychologist  12/28/2020  8:04 AM      Time spent with Patient: 30 minutes  This is patient's second  [facility] appointment. Reason for Consult:    Chief Complaint   Patient presents with    Depression       Feedback given to PCP. S:  Pt seen for f/u of depression and anxiety. Pt reported variable mood and sxs w increased psychosocial stressors. Noted she believes her stress did attribute to increased stomach pain. Stated she has \"waves\" of being disappointed about not dating. Notably, was able to tell a man that she wasn't interested in continuing to date him, which represents considerable growth for her. Discussed her pervasive pattern of feeling left out. Wants to focus most on anxiety. Noted she has been shorter-tempered than her typical nature. Stated it is never detrimental to her relationships, but she does feel need to apologize afterward. Notably, all of her kids were able to be together for Eliza wo difficulty. \"they all just seemed to agree to move on. \"     O:  MSE:    Appearance    alert, cooperative  Appetite normal  Sleep disturbance Yes  Fatigue Yes  Loss of pleasure No  Impulsive behavior No  Speech    spontaneous, normal rate, normal volume and well articulated  Mood    Depressed  Affect    depressed affect  Thought Content    intact and cognitive distortions  Thought Process    goal directed and coherent  Associations    logical connections  Insight    Fair  Judgment    Intact  Orientation    oriented to person, place, time, and general circumstances  Memory    recent and remote memory intact  Attention/Concentration    intact  Morbid ideation No  Suicide Assessment    no suicidal ideation    History:  Social History:   Social History     Socioeconomic History    Marital status:      Spouse name: Not on file    Number of children: Not on file    Years of education: Not on file    Highest education level: Not on file   Occupational History  Not on file   Social Needs    Financial resource strain: Not on file    Food insecurity     Worry: Not on file     Inability: Not on file    Transportation needs     Medical: Not on file     Non-medical: Not on file   Tobacco Use    Smoking status: Never Smoker    Smokeless tobacco: Never Used   Substance and Sexual Activity    Alcohol use: Not on file     Comment: occasional     Drug use: Never    Sexual activity: Not on file   Lifestyle    Physical activity     Days per week: Not on file     Minutes per session: Not on file    Stress: Not on file   Relationships    Social connections     Talks on phone: Not on file     Gets together: Not on file     Attends Hindu service: Not on file     Active member of club or organization: Not on file     Attends meetings of clubs or organizations: Not on file     Relationship status: Not on file    Intimate partner violence     Fear of current or ex partner: Not on file     Emotionally abused: Not on file     Physically abused: Not on file     Forced sexual activity: Not on file   Other Topics Concern    Not on file   Social History Narrative    Not on file     TOBACCO:   reports that she has never smoked. She has never used smokeless tobacco.  ETOH:   has no history on file for alcohol. Diagnosis:  1. Moderate episode of recurrent major depressive disorder ([facility] [address]    2. FRANCISCA (generalized anxiety disorder)          Plan:  Pt interventions:  Practiced assertive communication and Trained in strategies for increasing balanced thinking        Documentation was done using voice recognition dragon software. Every effort was made to ensure accuracy; however, inadvertent, unintentional computerized transcription errors may be present.

## 2020-12-31 RX ORDER — SULFASALAZINE 500 MG/1
500 TABLET ORAL 2 TIMES DAILY
Qty: 180 TABLET | Refills: 0 | Status: SHIPPED | OUTPATIENT
Start: 2020-12-31 | End: 2021-05-10 | Stop reason: SDUPTHER

## 2020-12-31 RX ORDER — FAMOTIDINE 40 MG/1
40 TABLET, FILM COATED ORAL EVERY EVENING
Qty: 90 TABLET | Refills: 1 | Status: SHIPPED | OUTPATIENT
Start: 2020-12-31 | End: 2021-03-05

## 2020-12-31 RX ORDER — BUSPIRONE HYDROCHLORIDE 5 MG/1
5 TABLET ORAL 3 TIMES DAILY PRN
Qty: 90 TABLET | Refills: 0 | Status: SHIPPED | OUTPATIENT
Start: 2020-12-31 | End: 2021-01-25

## 2020-12-31 NOTE — TELEPHONE ENCOUNTER
Needs refill on Sulfasalazine 500mg bid. Please send to [facility].  (Rheumatology)   Last visit 11/12/20  Lab 11/13/20  Next visit not scheduled with

## 2020-12-31 NOTE — TELEPHONE ENCOUNTER
Patient called to get refills on Buspirone 5 mg tid sent to [facility] [address] in [place]  Famotidine needs to go [facility]     Needs refill on Sulfasalazine 500mg bid. [facility].  (Rheumatology)

## 2021-01-14 ENCOUNTER — OFFICE VISIT (OUTPATIENT)
Dept: SURGERY | Age: 56
End: 2021-01-14
Payer: COMMERCIAL

## 2021-01-14 VITALS
TEMPERATURE: 96.7 F | DIASTOLIC BLOOD PRESSURE: 80 MMHG | WEIGHT: 199.5 LBS | SYSTOLIC BLOOD PRESSURE: 117 MMHG | BODY MASS INDEX: 33.2 KG/M2

## 2021-01-14 DIAGNOSIS — K44.9 PARAESOPHAGEAL HIATAL HERNIA: Primary | ICD-10-CM

## 2021-01-14 PROCEDURE — 99243 OFF/OP CNSLTJ NEW/EST LOW 30: CPT | Performed by: SURGERY

## 2021-01-14 ASSESSMENT — ENCOUNTER SYMPTOMS
CONSTIPATION: 1
VOICE CHANGE: 1
CHOKING: 1
BACK PAIN: 1
NAUSEA: 1
ABDOMINAL PAIN: 1
DIARRHEA: 1
TROUBLE SWALLOWING: 1
EYES NEGATIVE: 1

## 2021-01-14 NOTE — PROGRESS NOTES
[facility] GENERAL AND LAPAROSCOPIC SURGERY                       PATIENT NAME: Tonny Garcia DATE: 2021    Reason for Consult:  Hiatal Hernia    Requesting Physician / PCP:  Dr. Angelina Sierra / Dr. Ajay Corona:              The patient is a 54 y.o. female who presents with concerns of a hiatal hernia. Has had recurring heartburn, reflux, dysphagia. Has had PPI tx. Has had UGI with mixed / paraesophageal HH noted. EGD done. Has had recurring nausea. No prior gastric surgery. No ulcer. Has had cholecystectomy. Stable weight, some food intolerance. .    Past Medical History:        Diagnosis Date    Chronic depression     DDD (degenerative disc disease), lumbar     Fibromyalgia     HTN (hypertension)     KARTHIK (obstructive sleep apnea)     Psoriatic arthritis ([address]        Past Surgical History:        Procedure Laterality Date    BLADDER SUSPENSION  2004     SECTION  08/31/1987    x1    CHOLECYSTECTOMY  2000    ENDOMETRIAL ABLATION  2007    HYSTERECTOMY, TOTAL ABDOMINAL  2009    heavy menses    NASAL SEPTUM SURGERY  2005    NERVE SURGERY Bilateral 2019    BILATERAL L4-5 AND L5-S1 LUMBAR FACET INJECTIONS WITH FLUOROSCOPY performed by Les Echavarria MD at [place] [address]         Current Medications:   No current facility-administered medications for this visit. Prior to Admission medications    Medication Sig Start Date End Date Taking?  Authorizing Provider   busPIRone (BUSPAR) 5 MG tablet Take 1 tablet by mouth 3 times daily as needed (anxiety) 20 Yes Tawana Avendaño DO   famotidine (PEPCID) 40 MG tablet Take 1 tablet by mouth every evening 20  Yes Tawana Avendaño DO   sulfaSALAzine (AZULFIDINE) 500 MG tablet Take 1 tablet by mouth 2 times daily 20 Yes Marc Reno MD   amitriptyline (ELAVIL) 50 MG tablet Take 1 tablet by mouth nightly 12/15/20  Yes Tawana Avendaño DO sucralfate (CARAFATE) 1 GM tablet TAKE 1 TABLET BY MOUTH 4 TIMES A DAY 12/9/20  Yes Anibal Geronimo DO   DULoxetine (CYMBALTA) 60 MG extended release capsule Take 1 capsule by mouth daily Take with 30 mg 8/19/20  Yes Anibal Geronimo DO   pantoprazole (PROTONIX) 40 MG tablet Take 1 tablet by mouth daily 8/14/20  Yes Anibal Geronimo DO   metoprolol tartrate (LOPRESSOR) 50 MG tablet Take 1 tablet by mouth 2 times daily 7/14/20  Yes Anibal Geronimo DO   ondansetron (ZOFRAN ODT) 8 MG TBDP disintegrating tablet Place 1 tablet under the tongue every 8 hours as needed for Nausea, Vomiting or Other (migraines) 3/9/20  Yes Anibal Geronimo DO   Cholecalciferol (VITAMIN D3) 2000 units CAPS Take by mouth   Yes Historical Provider, MD   DULoxetine (CYMBALTA) 30 MG extended release capsule Take 1 capsule by mouth daily Take with 60 mg Cymbalta 8/19/20 11/17/20  Anibal Geronimo DO   clonazePAM (KLONOPIN) 0.5 MG tablet 1/2-1 po q 8 hours prn severe anxiety 8/18/20 9/18/20  Anibal Geronimo DO   SUMAtriptan (IMITREX) 100 MG tablet Take 1 tablet by mouth once as needed for Migraine May repeat once after 2 hours if needed. No more than 2 per 24 hour period. 3/9/20 3/9/20  Anibal Geronimo DO        Allergies:  Morphine    Social History:    reports that she has never smoked. She has never used smokeless tobacco. She reports that she does not use drugs.     Family History:        Problem Relation Age of Onset    Cancer Mother         gallbladder cancer    High Blood Pressure Mother     Alcohol Abuse Mother     High Blood Pressure Father    Melchor Baron Cancer Sister         small cell lung cancer (smoker)    Heart Disease Maternal Grandmother     Heart Disease Maternal Grandfather     Stroke Maternal Grandfather     Heart Disease Paternal Grandmother     Heart Disease Paternal Grandfather     No Known Problems Half-Brother     No Known Problems Half-Sister     Other Half-Sibling         skin cancer    Other Half-Sister         blood clots    Substance Abuse Daughter 32        in remission        REVIEW OF SYSTEMS:  Review of Systems   Constitutional: Positive for activity change and appetite change. HENT: Positive for trouble swallowing and voice change. Eyes: Negative. Respiratory: Positive for choking. Cardiovascular: Positive for palpitations. Gastrointestinal: Positive for abdominal pain, constipation, diarrhea and nausea. Endocrine: Positive for cold intolerance. Genitourinary: Negative. Musculoskeletal: Positive for back pain. Skin: Negative. Allergic/Immunologic: Positive for environmental allergies and immunocompromised state. Neurological: Positive for headaches. Hematological: Negative. Psychiatric/Behavioral: The patient is nervous/anxious. PHYSICAL EXAM:  VITALS:  Temp 96.7 °F (35.9 °C)   Wt 199 lb 8 oz (90.5 kg)   BMI 33.20 kg/m²   CONSTITUTIONAL:  alert, no apparent distress and mildly obese  EYES:  sclera clear  ENT:  normocepalic, without obvious abnormality  NECK:  supple, symmetrical, trachea midline and no carotid bruits  LUNGS:  clear to auscultation  CARDIOVASCULAR:  regular rate and rhythm and no murmur noted  ABDOMEN:  scars noted are healed, normal bowel sounds, soft, non-distended, non-tender, voluntary guarding absent, no masses palpated, no hepatosplenomegally and hernia absent  MUSCULOSKELETAL:  0+ pitting edema lower extremities  NEUROLOGIC:  Mental Status Exam:  Level of Alertness:   awake  Orientation:   person, place, time  SKIN:  no bruising or bleeding, normal skin color, texture, turgor and no redness, warmth, or swelling      Radiology Review:   UGI  EXAMINATION:   CT OF THE ABDOMEN AND PELVIS WITHOUT CONTRAST 7/24/2019 7:30 pm       TECHNIQUE:   CT of the abdomen and pelvis was performed without the administration of   intravenous contrast. Multiplanar reformatted images are provided for review.    Dose modulation, iterative reconstruction, and/or weight based adjustment of   the mA/kV was utilized to reduce the radiation dose to as low as reasonably   achievable.       COMPARISON:   None.       HISTORY:   ORDERING SYSTEM PROVIDED HISTORY: Acute LUQ pain   TECHNOLOGIST PROVIDED HISTORY:   Additional Contrast?->Radiologist Recommendation   Reason for exam:->LUQ pain and tenderness. enlarged spleen on exam   Reason for Exam: LUQ pain and tenderness. enlarged spleen on exam   Acuity: Acute   Type of Exam: Initial   Relevant Medical/Surgical History: LUQ pain and tenderness. enlarged spleen   on exam       FINDINGS:   Lower Chest: Motion artifact degrades the lung bases       Bandlike opacities are seen in the lower lobes bilaterally, with bandlike   morphology favoring atelectasis or scar.       Moderate size hiatal hernia seen. Lawerance Reichmann is nonspecific thickening at the GE   junction       Organs: No perisplenic fluid. .  No splenomegaly.  Spleen measures 8.3 cm in   length.       Adrenal glands appear normal       No stones or hydronephrosis on right.       No stones or hydronephrosis on left.  There are clips from prior   cholecystectomy.  No intrahepatic ductal dilatation.  No perihepatic fluid.       No peripancreatic inflammatory change       GI/Bowel: Large stool load is seen throughout the colon.  A few colonic   diverticula are seen.  No significant pericolonic stranding.  No pericolonic   abscess       Appendix is identified and normal in caliber       Pelvis: Bladder is incompletely distended, accentuating its wall thickness.    No free fluid in pelvis.  No pelvic adenopathy.       Peritoneum/Retroperitoneum: No retroperitoneal adenopathy.  No aortic aneurysm       Bones/Soft Tissues: Tiny periumbilical hernia containing fat is seen.       No acute bony abnormality           Impression   Large stool load with scattered diverticula.  No significant pericolonic   stranding       Moderate size hiatal hernia         EXAMINATION:   DOUBLE CONTRAST UPPER GI SERIES       12/11/2020       TECHNIQUE:   Double contrast upper GI series was performed with barium and air contrast.       FLUOROSCOPY DOSE AND TYPE OR TIME AND EXPOSURES:   2 minutes 30 seconds, 20 exposures       COMPARISON:   None       HISTORY:   ORDERING SYSTEM PROVIDED HISTORY: Abdominal pain, epigastric   TECHNOLOGIST PROVIDED HISTORY:   Reason for Exam: Patient reports pain/attack after eating. Pain is always   associated with eating. Pain in stomach and up to left shoulder. No surgery   to stomach. Had gallbladder removed. Has hiatal hernia   Acuity: Acute   Type of Exam: Initial       FINDINGS:   The esophagus has a smooth mucosa. There are no esophageal strictures,   ulcers, or masses. Motility appears normal.       There is a large mixed axial and paraesophageal hiatal hernia. The stomach   has a smooth mucosa with no ulcers or masses. The pyloric channel is smooth. The duodenal bulb has a normal contour. The duodenal loop has a normal fold   pattern           Impression   Large mixed type hiatal hernia. Normal appearance of the esophagus and   duodenum           IMPRESSION/RECOMMENDATIONS:    Paraesophageal hiatal hernia    Imaging reviewed, and d/w pt. Repair D/W pt, recommend proceeding, R/B/A reviewed, handouts reviewed. All questions have been answered, and they agree to proceed. Schedule as outpt at [facility].       Felicitas Mishra

## 2021-01-25 RX ORDER — BUSPIRONE HYDROCHLORIDE 5 MG/1
TABLET ORAL
Qty: 90 TABLET | Refills: 0 | Status: SHIPPED | OUTPATIENT
Start: 2021-01-25 | End: 2021-01-27 | Stop reason: SDUPTHER

## 2021-02-01 ENCOUNTER — TELEPHONE (OUTPATIENT)
Dept: ADMINISTRATIVE | Age: 56
End: 2021-02-01

## 2021-02-01 NOTE — TELEPHONE ENCOUNTER
[facility] received a call from:    Name of Caller: Kayy Wong    Relationship to patient:[employer]    Organization name: [employer] contact number: [phone] opt [phone]    Reason for call: drug interaction    Ref [id number]    Serotonin syndrome issue - this is their last attempt and this med will be put on hocked until the receive a respoionse

## 2021-02-01 NOTE — TELEPHONE ENCOUNTER
Ana concerned because concurrent use of Amitriptyline and Cymbalta increases the risk of serotonin syndrome. They would like to make sure Dr Argentina Simmons is aware and ok with this being filled. New reference number [id number].

## 2021-02-05 NOTE — PROGRESS NOTES
Name_______________________________________Printed:____________________  Date and time of surgery____2/12/2021   1030____________________Arrival Time:____0900   MAIN____________   1. The instructions given regarding when and if a patient needs to stop oral intake prior to surgery varies. Follow the specific instructions you were given                  _X__Nothing to eat or to drink after Midnight the night before.                   ____Carbo loading or ERAS instructions will be given to select patients-if you have been given those instructions -please do the following                           The evening before your surgery after dinner before midnight drink 40 ounces of gatorade. If you are diabetic use sugar free. The morning of surgery drink 40 ounces of water. This needs to be finished 3 hours prior to your surgery start time. 2. Take the following pills with a small sip of water on the morning of surgery___METOPROLOL, PROTONIX, BUSPAR________________________________________________                  Do not take blood pressure medications ending in pril or sartan the usha prior to surgery or the morning of surgery_   3. Aspirin, Ibuprofen, Advil, Naproxen, Vitamin E and other Anti-inflammatory products and supplements should be stopped for 5 -7days before surgery or as directed by your physician. 4. Check with your Doctor regarding stopping Plavix, Coumadin,Eliquis, Lovenox,Effient,Pradaxa,Xarelto, Fragmin or other blood thinners and follow their instructions. 5. Do not smoke, and do not drink any alcoholic beverages 24 hours prior to surgery. This includes NA Beer. Refrain from the usage of any recreational drugs. 6. You may brush your teeth and gargle the morning of surgery. DO NOT SWALLOW WATER   7. You MUST make arrangements for a responsible adult to stay on site while you are here and take you home after your surgery. You will not be allowed to leave alone or drive yourself home.   It is strongly suggested someone stay with you the first 24 hrs. Your surgery will be cancelled if you do not have a ride home. 8. A parent/legal guardian must accompany a child scheduled for surgery and plan to stay at the hospital until the child is discharged. Please do not bring other children with you. 9. Please wear simple, loose fitting clothing to the hospital.  Sharita Ferguson not bring valuables (money, credit cards, checkbooks, etc.) Do not wear any makeup (including no eye makeup) or nail polish on your fingers or toes. 10. DO NOT wear any jewelry or piercings on day of surgery. All body piercing jewelry must be removed. 11. If you have ___dentures, they will be removed before going to the OR; we will provide you a container. If you wear ___contact lenses or _X__glasses, they will be removed; please bring a case for them. 12. Please see your family doctor/pediatrician for a history & physical and/or concerning medications. Bring any test results/reports from your physician's office. PCP__________________Phone___________H&P Appt. Date________             13 If you  have a Living Will and Durable Power of  for Healthcare, please bring in a copy. 15. Notify your Surgeon if you develop any illness between now and surgery  time, cough, cold, fever, sore throat, nausea, vomiting, etc.  Please notify your surgeon if you experience dizziness, shortness of breath or blurred vision between now & the time of your surgery             15. DO NOT shave your operative site 96 hours prior to surgery. For face & neck surgery, men may use an electric razor 48 hours prior to surgery. 16. Shower the night before or morning of surgery using an antibacterial soap or as you have been instructed. 17. To provide excellent care visitors will be limited to one in the room at any given time. 18.  Please bring picture ID and insurance card.              19. Visit our web site for additional information:  [email or web address]              20.During flu season no children under the age of 15 are permitted in the hospital for the safety of all patients. 21. If you take a long acting insulin in the evening only  take half of your usual  dose the night  before your procedure              22. If you use a c-pap please bring DOS if staying overnight,             23.For your convenience [facility] has a pharmacy on site to fill your prescriptions. 24. If you use oxygen and have a portable tank please bring it  with you the DOS             25. Bring a complete list of all your medications with name and dose include any supplements. 26. Other__________________________________________   *Please call pre admission testing if you any further questions   Terence Sood   [address]    [place] [id number] [place]  [phone]   [facility]    __ Laquita Alken _____  _X_ Scheduled _2/8/2021__ Where __[place]_   __ Other __________      [id number] POLICY(subject to change)    There is a one visitor policy at [facility] for all surgeries and endoscopies. Whether the visitor can stay or will be asked to wait in the car will depend on the current policy and if social distancing can be maintained. The policy is subject to change at any time. Please make sure the visitor has a cell phone that is on,charged and able to accept calls, as this may be the way that the staff communicates with them. Pain management is NO VISITOR policyThe patients ride is expected to remain in the car with a cell phone for communication. If the ride is leaving the hospital grounds please make sure they are back in time for pickup. Have the patient inform the staff on arrival what their rides plans are while the patient is in the facility. At the MAIN there is one visitor allowed. Please note that the visitor policy is subject to change. All above information reviewed with patient in person or by phone. Patient verbalizes understanding. All questions and concerns addressed.                                                                                                  Patient/Rep____PATIENT________________                                                                                                                                    PRE OP INSTRUCTIONS

## 2021-02-08 ENCOUNTER — OFFICE VISIT (OUTPATIENT)
Dept: PRIMARY CARE CLINIC | Age: 56
End: 2021-02-08
Payer: COMMERCIAL

## 2021-02-08 DIAGNOSIS — Z20.828 EXPOSURE TO SARS-ASSOCIATED CORONAVIRUS: Primary | ICD-10-CM

## 2021-02-08 PROCEDURE — 99211 OFF/OP EST MAY X REQ PHY/QHP: CPT | Performed by: NURSE PRACTITIONER

## 2021-02-08 NOTE — PROGRESS NOTES
Rin Cohn received a viral test for COVID-19. They were educated on isolation and quarantine as appropriate. For any symptoms, they were directed to seek care from their PCP, given contact information to establish with a doctor, directed to an urgent care or the emergency room.

## 2021-02-08 NOTE — PATIENT INSTRUCTIONS

## 2021-02-09 LAB — SARS-COV-2: NOT DETECTED

## 2021-02-10 ENCOUNTER — PATIENT MESSAGE (OUTPATIENT)
Dept: INTERNAL MEDICINE CLINIC | Age: 56
End: 2021-02-10

## 2021-02-10 DIAGNOSIS — F41.9 ANXIETY: ICD-10-CM

## 2021-02-10 DIAGNOSIS — F32.A CHRONIC DEPRESSION: ICD-10-CM

## 2021-02-10 RX ORDER — DULOXETIN HYDROCHLORIDE 60 MG/1
60 CAPSULE, DELAYED RELEASE ORAL DAILY
Qty: 90 CAPSULE | Refills: 3 | Status: SHIPPED | OUTPATIENT
Start: 2021-02-10 | End: 2022-03-09 | Stop reason: SDUPTHER

## 2021-02-10 NOTE — TELEPHONE ENCOUNTER
From: Camryn Wilkins  To: Dwain Suazo DO  Sent: 2/10/2021 10:03 AM EST  Subject: Prescription Question    [facility] is requesting a 90 day prescription for 60mg Cymbalta generic be sent to [phone]. They claim that they've requested it several times. Thank you.

## 2021-02-12 ENCOUNTER — ANESTHESIA (OUTPATIENT)
Dept: OPERATING ROOM | Age: 56
End: 2021-02-12
Payer: COMMERCIAL

## 2021-02-12 ENCOUNTER — ANESTHESIA EVENT (OUTPATIENT)
Dept: OPERATING ROOM | Age: 56
End: 2021-02-12
Payer: COMMERCIAL

## 2021-02-12 ENCOUNTER — HOSPITAL ENCOUNTER (OUTPATIENT)
Age: 56
Setting detail: OBSERVATION
Discharge: HOME | End: 2021-02-13
Attending: SURGERY | Admitting: SURGERY
Payer: COMMERCIAL

## 2021-02-12 VITALS
TEMPERATURE: 95.5 F | SYSTOLIC BLOOD PRESSURE: 107 MMHG | RESPIRATION RATE: 5 BRPM | OXYGEN SATURATION: 83 % | DIASTOLIC BLOOD PRESSURE: 60 MMHG

## 2021-02-12 DIAGNOSIS — K44.9 PARAESOPHAGEAL HIATAL HERNIA: Primary | ICD-10-CM

## 2021-02-12 LAB
ABO/RH: NORMAL
ANION GAP SERPL CALCULATED.3IONS-SCNC: 11 MMOL/L (ref 3–16)
ANTIBODY SCREEN: NORMAL
BUN BLDV-MCNC: 12 MG/DL (ref 7–20)
CALCIUM SERPL-MCNC: 9.2 MG/DL (ref 8.3–10.6)
CHLORIDE BLD-SCNC: 105 MMOL/L (ref 99–110)
CO2: 24 MMOL/L (ref 21–32)
CREAT SERPL-MCNC: 0.8 MG/DL (ref 0.6–1.1)
GFR AFRICAN AMERICAN: >60
GFR NON-AFRICAN AMERICAN: >60
GLUCOSE BLD-MCNC: 116 MG/DL (ref 70–99)
HCT VFR BLD CALC: 40.3 % (ref 36–48)
HEMOGLOBIN: 13.1 G/DL (ref 12–16)
MCH RBC QN AUTO: 30.5 PG (ref 26–34)
MCHC RBC AUTO-ENTMCNC: 32.6 G/DL (ref 31–36)
MCV RBC AUTO: 93.4 FL (ref 80–100)
PDW BLD-RTO: 14.1 % (ref 12.4–15.4)
PLATELET # BLD: 337 K/UL (ref 135–450)
PMV BLD AUTO: 7.4 FL (ref 5–10.5)
POTASSIUM SERPL-SCNC: 4.2 MMOL/L (ref 3.5–5.1)
RBC # BLD: 4.31 M/UL (ref 4–5.2)
SODIUM BLD-SCNC: 140 MMOL/L (ref 136–145)
WBC # BLD: 3.9 K/UL (ref 4–11)

## 2021-02-12 PROCEDURE — 6360000002 HC RX W HCPCS: Performed by: NURSE ANESTHETIST, CERTIFIED REGISTERED

## 2021-02-12 PROCEDURE — 2720000010 HC SURG SUPPLY STERILE: Performed by: SURGERY

## 2021-02-12 PROCEDURE — 6360000002 HC RX W HCPCS: Performed by: ANESTHESIOLOGY

## 2021-02-12 PROCEDURE — 94150 VITAL CAPACITY TEST: CPT

## 2021-02-12 PROCEDURE — 3700000000 HC ANESTHESIA ATTENDED CARE: Performed by: SURGERY

## 2021-02-12 PROCEDURE — 85027 COMPLETE CBC AUTOMATED: CPT

## 2021-02-12 PROCEDURE — 2500000003 HC RX 250 WO HCPCS: Performed by: NURSE ANESTHETIST, CERTIFIED REGISTERED

## 2021-02-12 PROCEDURE — 6370000000 HC RX 637 (ALT 250 FOR IP)

## 2021-02-12 PROCEDURE — 2500000003 HC RX 250 WO HCPCS: Performed by: SURGERY

## 2021-02-12 PROCEDURE — C9113 INJ PANTOPRAZOLE SODIUM, VIA: HCPCS | Performed by: SURGERY

## 2021-02-12 PROCEDURE — 2580000003 HC RX 258: Performed by: ANESTHESIOLOGY

## 2021-02-12 PROCEDURE — 6360000002 HC RX W HCPCS: Performed by: SURGERY

## 2021-02-12 PROCEDURE — 6360000002 HC RX W HCPCS

## 2021-02-12 PROCEDURE — 86850 RBC ANTIBODY SCREEN: CPT

## 2021-02-12 PROCEDURE — 3600000015 HC SURGERY LEVEL 5 ADDTL 15MIN: Performed by: SURGERY

## 2021-02-12 PROCEDURE — 6370000000 HC RX 637 (ALT 250 FOR IP): Performed by: SURGERY

## 2021-02-12 PROCEDURE — 3600000005 HC SURGERY LEVEL 5 BASE: Performed by: SURGERY

## 2021-02-12 PROCEDURE — 7100000001 HC PACU RECOVERY - ADDTL 15 MIN: Performed by: SURGERY

## 2021-02-12 PROCEDURE — 2580000003 HC RX 258: Performed by: SURGERY

## 2021-02-12 PROCEDURE — 43282 LAP PARAESOPH HER RPR W/MESH: CPT | Performed by: SURGERY

## 2021-02-12 PROCEDURE — 80048 BASIC METABOLIC PNL TOTAL CA: CPT

## 2021-02-12 PROCEDURE — 94760 N-INVAS EAR/PLS OXIMETRY 1: CPT

## 2021-02-12 PROCEDURE — 3700000001 HC ADD 15 MINUTES (ANESTHESIA): Performed by: SURGERY

## 2021-02-12 PROCEDURE — 86901 BLOOD TYPING SEROLOGIC RH(D): CPT

## 2021-02-12 PROCEDURE — 86900 BLOOD TYPING SEROLOGIC ABO: CPT

## 2021-02-12 PROCEDURE — 2709999900 HC NON-CHARGEABLE SUPPLY: Performed by: SURGERY

## 2021-02-12 PROCEDURE — G0378 HOSPITAL OBSERVATION PER HR: HCPCS

## 2021-02-12 PROCEDURE — C1781 MESH (IMPLANTABLE): HCPCS | Performed by: SURGERY

## 2021-02-12 PROCEDURE — 36415 COLL VENOUS BLD VENIPUNCTURE: CPT

## 2021-02-12 PROCEDURE — 6370000000 HC RX 637 (ALT 250 FOR IP): Performed by: ANESTHESIOLOGY

## 2021-02-12 PROCEDURE — 7100000000 HC PACU RECOVERY - FIRST 15 MIN: Performed by: SURGERY

## 2021-02-12 PROCEDURE — 88302 TISSUE EXAM BY PATHOLOGIST: CPT

## 2021-02-12 DEVICE — MESH HERN W7XL10CM SYN TISS REINF BIOABSRB DISP BIO-A: Type: IMPLANTABLE DEVICE | Site: ABDOMEN | Status: FUNCTIONAL

## 2021-02-12 RX ORDER — SODIUM CHLORIDE, SODIUM LACTATE, POTASSIUM CHLORIDE, CALCIUM CHLORIDE 600; 310; 30; 20 MG/100ML; MG/100ML; MG/100ML; MG/100ML
INJECTION, SOLUTION INTRAVENOUS CONTINUOUS PRN
Status: COMPLETED | OUTPATIENT
Start: 2021-02-12 | End: 2021-02-12

## 2021-02-12 RX ORDER — HYDROMORPHONE HYDROCHLORIDE 1 MG/ML
0.5 INJECTION, SOLUTION INTRAMUSCULAR; INTRAVENOUS; SUBCUTANEOUS
Status: DISCONTINUED | OUTPATIENT
Start: 2021-02-12 | End: 2021-02-13 | Stop reason: HOSPADM

## 2021-02-12 RX ORDER — PROPOFOL 10 MG/ML
INJECTION, EMULSION INTRAVENOUS PRN
Status: DISCONTINUED | OUTPATIENT
Start: 2021-02-12 | End: 2021-02-12 | Stop reason: SDUPTHER

## 2021-02-12 RX ORDER — BUPIVACAINE HYDROCHLORIDE 5 MG/ML
INJECTION, SOLUTION EPIDURAL; INTRACAUDAL
Status: COMPLETED | OUTPATIENT
Start: 2021-02-12 | End: 2021-02-12

## 2021-02-12 RX ORDER — METOCLOPRAMIDE HYDROCHLORIDE 5 MG/ML
10 INJECTION INTRAMUSCULAR; INTRAVENOUS EVERY 6 HOURS
Status: COMPLETED | OUTPATIENT
Start: 2021-02-12 | End: 2021-02-13

## 2021-02-12 RX ORDER — DEXAMETHASONE SODIUM PHOSPHATE 4 MG/ML
INJECTION, SOLUTION INTRA-ARTICULAR; INTRALESIONAL; INTRAMUSCULAR; INTRAVENOUS; SOFT TISSUE PRN
Status: DISCONTINUED | OUTPATIENT
Start: 2021-02-12 | End: 2021-02-12 | Stop reason: SDUPTHER

## 2021-02-12 RX ORDER — VASOPRESSIN 20 U/ML
INJECTION PARENTERAL PRN
Status: DISCONTINUED | OUTPATIENT
Start: 2021-02-12 | End: 2021-02-12 | Stop reason: SDUPTHER

## 2021-02-12 RX ORDER — HYDROMORPHONE HCL 110MG/55ML
0.5 PATIENT CONTROLLED ANALGESIA SYRINGE INTRAVENOUS EVERY 5 MIN PRN
Status: DISCONTINUED | OUTPATIENT
Start: 2021-02-12 | End: 2021-02-12 | Stop reason: HOSPADM

## 2021-02-12 RX ORDER — LIDOCAINE HYDROCHLORIDE 10 MG/ML
1 INJECTION, SOLUTION EPIDURAL; INFILTRATION; INTRACAUDAL; PERINEURAL
Status: DISCONTINUED | OUTPATIENT
Start: 2021-02-12 | End: 2021-02-12 | Stop reason: HOSPADM

## 2021-02-12 RX ORDER — PROMETHAZINE HYDROCHLORIDE 25 MG/ML
6.25 INJECTION, SOLUTION INTRAMUSCULAR; INTRAVENOUS ONCE
Status: DISCONTINUED | OUTPATIENT
Start: 2021-02-12 | End: 2021-02-12

## 2021-02-12 RX ORDER — SODIUM CHLORIDE 0.9 % (FLUSH) 0.9 %
10 SYRINGE (ML) INJECTION PRN
Status: DISCONTINUED | OUTPATIENT
Start: 2021-02-12 | End: 2021-02-13 | Stop reason: HOSPADM

## 2021-02-12 RX ORDER — GLYCOPYRROLATE 0.2 MG/ML
INJECTION INTRAMUSCULAR; INTRAVENOUS PRN
Status: DISCONTINUED | OUTPATIENT
Start: 2021-02-12 | End: 2021-02-12 | Stop reason: SDUPTHER

## 2021-02-12 RX ORDER — SUCCINYLCHOLINE CHLORIDE 20 MG/ML
INJECTION INTRAMUSCULAR; INTRAVENOUS PRN
Status: DISCONTINUED | OUTPATIENT
Start: 2021-02-12 | End: 2021-02-12 | Stop reason: SDUPTHER

## 2021-02-12 RX ORDER — HYDROCODONE BITARTRATE AND ACETAMINOPHEN 5; 325 MG/1; MG/1
1 TABLET ORAL
Status: DISCONTINUED | OUTPATIENT
Start: 2021-02-12 | End: 2021-02-12 | Stop reason: HOSPADM

## 2021-02-12 RX ORDER — ROCURONIUM BROMIDE 10 MG/ML
INJECTION, SOLUTION INTRAVENOUS PRN
Status: DISCONTINUED | OUTPATIENT
Start: 2021-02-12 | End: 2021-02-12 | Stop reason: SDUPTHER

## 2021-02-12 RX ORDER — OXYCODONE HYDROCHLORIDE 5 MG/1
5 TABLET ORAL EVERY 4 HOURS PRN
Status: DISCONTINUED | OUTPATIENT
Start: 2021-02-12 | End: 2021-02-13 | Stop reason: HOSPADM

## 2021-02-12 RX ORDER — SODIUM CHLORIDE 9 MG/ML
INJECTION, SOLUTION INTRAVENOUS CONTINUOUS
Status: DISCONTINUED | OUTPATIENT
Start: 2021-02-12 | End: 2021-02-12

## 2021-02-12 RX ORDER — ACETAMINOPHEN 325 MG/1
650 TABLET ORAL EVERY 6 HOURS
Status: DISCONTINUED | OUTPATIENT
Start: 2021-02-12 | End: 2021-02-13 | Stop reason: HOSPADM

## 2021-02-12 RX ORDER — HYDROMORPHONE HCL 110MG/55ML
0.25 PATIENT CONTROLLED ANALGESIA SYRINGE INTRAVENOUS EVERY 5 MIN PRN
Status: DISCONTINUED | OUTPATIENT
Start: 2021-02-12 | End: 2021-02-12 | Stop reason: HOSPADM

## 2021-02-12 RX ORDER — ONDANSETRON 2 MG/ML
4 INJECTION INTRAMUSCULAR; INTRAVENOUS
Status: DISCONTINUED | OUTPATIENT
Start: 2021-02-12 | End: 2021-02-12 | Stop reason: HOSPADM

## 2021-02-12 RX ORDER — KETOROLAC TROMETHAMINE 30 MG/ML
INJECTION, SOLUTION INTRAMUSCULAR; INTRAVENOUS PRN
Status: DISCONTINUED | OUTPATIENT
Start: 2021-02-12 | End: 2021-02-12 | Stop reason: SDUPTHER

## 2021-02-12 RX ORDER — LIDOCAINE HYDROCHLORIDE 20 MG/ML
INJECTION, SOLUTION EPIDURAL; INFILTRATION; INTRACAUDAL; PERINEURAL PRN
Status: DISCONTINUED | OUTPATIENT
Start: 2021-02-12 | End: 2021-02-12 | Stop reason: SDUPTHER

## 2021-02-12 RX ORDER — ONDANSETRON 2 MG/ML
INJECTION INTRAMUSCULAR; INTRAVENOUS PRN
Status: DISCONTINUED | OUTPATIENT
Start: 2021-02-12 | End: 2021-02-12 | Stop reason: SDUPTHER

## 2021-02-12 RX ORDER — HYDROMORPHONE HCL 110MG/55ML
PATIENT CONTROLLED ANALGESIA SYRINGE INTRAVENOUS PRN
Status: DISCONTINUED | OUTPATIENT
Start: 2021-02-12 | End: 2021-02-12 | Stop reason: SDUPTHER

## 2021-02-12 RX ORDER — PANTOPRAZOLE SODIUM 40 MG/10ML
40 INJECTION, POWDER, LYOPHILIZED, FOR SOLUTION INTRAVENOUS DAILY
Status: DISCONTINUED | OUTPATIENT
Start: 2021-02-12 | End: 2021-02-13 | Stop reason: HOSPADM

## 2021-02-12 RX ORDER — APREPITANT 40 MG/1
40 CAPSULE ORAL ONCE
Status: COMPLETED | OUTPATIENT
Start: 2021-02-12 | End: 2021-02-12

## 2021-02-12 RX ORDER — PROMETHAZINE HYDROCHLORIDE 25 MG/ML
6.25 INJECTION, SOLUTION INTRAMUSCULAR; INTRAVENOUS ONCE
Status: COMPLETED | OUTPATIENT
Start: 2021-02-12 | End: 2021-02-12

## 2021-02-12 RX ORDER — SODIUM CHLORIDE 0.9 % (FLUSH) 0.9 %
10 SYRINGE (ML) INJECTION EVERY 12 HOURS SCHEDULED
Status: DISCONTINUED | OUTPATIENT
Start: 2021-02-12 | End: 2021-02-13 | Stop reason: HOSPADM

## 2021-02-12 RX ORDER — MIDAZOLAM HYDROCHLORIDE 1 MG/ML
INJECTION INTRAMUSCULAR; INTRAVENOUS PRN
Status: DISCONTINUED | OUTPATIENT
Start: 2021-02-12 | End: 2021-02-12 | Stop reason: SDUPTHER

## 2021-02-12 RX ORDER — HYDROCODONE BITARTRATE AND ACETAMINOPHEN 5; 325 MG/1; MG/1
1 TABLET ORAL EVERY 4 HOURS PRN
Qty: 25 TABLET | Refills: 0 | Status: SHIPPED | OUTPATIENT
Start: 2021-02-12 | End: 2021-02-19

## 2021-02-12 RX ORDER — ACETAMINOPHEN 325 MG/1
650 TABLET ORAL ONCE
Status: COMPLETED | OUTPATIENT
Start: 2021-02-12 | End: 2021-02-12

## 2021-02-12 RX ORDER — MAGNESIUM SULFATE HEPTAHYDRATE 500 MG/ML
INJECTION, SOLUTION INTRAMUSCULAR; INTRAVENOUS PRN
Status: DISCONTINUED | OUTPATIENT
Start: 2021-02-12 | End: 2021-02-12 | Stop reason: SDUPTHER

## 2021-02-12 RX ORDER — PROMETHAZINE HYDROCHLORIDE 25 MG/ML
INJECTION, SOLUTION INTRAMUSCULAR; INTRAVENOUS
Status: COMPLETED
Start: 2021-02-12 | End: 2021-02-12

## 2021-02-12 RX ORDER — MAGNESIUM HYDROXIDE 1200 MG/15ML
LIQUID ORAL CONTINUOUS PRN
Status: COMPLETED | OUTPATIENT
Start: 2021-02-12 | End: 2021-02-12

## 2021-02-12 RX ORDER — KETOROLAC TROMETHAMINE 15 MG/ML
15 INJECTION, SOLUTION INTRAMUSCULAR; INTRAVENOUS EVERY 6 HOURS PRN
Status: DISCONTINUED | OUTPATIENT
Start: 2021-02-12 | End: 2021-02-13 | Stop reason: HOSPADM

## 2021-02-12 RX ORDER — DEXTROSE, SODIUM CHLORIDE, AND POTASSIUM CHLORIDE 5; .45; .15 G/100ML; G/100ML; G/100ML
INJECTION INTRAVENOUS CONTINUOUS
Status: DISCONTINUED | OUTPATIENT
Start: 2021-02-12 | End: 2021-02-13 | Stop reason: HOSPADM

## 2021-02-12 RX ORDER — HYDROMORPHONE HYDROCHLORIDE 1 MG/ML
0.25 INJECTION, SOLUTION INTRAMUSCULAR; INTRAVENOUS; SUBCUTANEOUS
Status: DISCONTINUED | OUTPATIENT
Start: 2021-02-12 | End: 2021-02-13 | Stop reason: HOSPADM

## 2021-02-12 RX ORDER — NEOSTIGMINE METHYLSULFATE 1 MG/ML
INJECTION, SOLUTION INTRAVENOUS PRN
Status: DISCONTINUED | OUTPATIENT
Start: 2021-02-12 | End: 2021-02-12 | Stop reason: SDUPTHER

## 2021-02-12 RX ADMIN — Medication 10 ML: at 21:50

## 2021-02-12 RX ADMIN — POTASSIUM CHLORIDE, DEXTROSE MONOHYDRATE AND SODIUM CHLORIDE: 150; 5; 450 INJECTION, SOLUTION INTRAVENOUS at 22:02

## 2021-02-12 RX ADMIN — HYDROMORPHONE HYDROCHLORIDE 0.5 MG: 2 INJECTION, SOLUTION INTRAMUSCULAR; INTRAVENOUS; SUBCUTANEOUS at 13:17

## 2021-02-12 RX ADMIN — ACETAMINOPHEN 650 MG: 325 TABLET ORAL at 21:48

## 2021-02-12 RX ADMIN — METOCLOPRAMIDE 10 MG: 5 INJECTION, SOLUTION INTRAMUSCULAR; INTRAVENOUS at 15:10

## 2021-02-12 RX ADMIN — PHENYLEPHRINE HYDROCHLORIDE 100 MCG: 10 INJECTION INTRAVENOUS at 10:50

## 2021-02-12 RX ADMIN — PHENYLEPHRINE HYDROCHLORIDE 100 MCG: 10 INJECTION INTRAVENOUS at 11:56

## 2021-02-12 RX ADMIN — HYDROMORPHONE HYDROCHLORIDE 0.5 MG: 2 INJECTION, SOLUTION INTRAMUSCULAR; INTRAVENOUS; SUBCUTANEOUS at 10:29

## 2021-02-12 RX ADMIN — DEXAMETHASONE SODIUM PHOSPHATE 8 MG: 4 INJECTION, SOLUTION INTRAMUSCULAR; INTRAVENOUS at 10:41

## 2021-02-12 RX ADMIN — Medication 3 MG: at 12:49

## 2021-02-12 RX ADMIN — ONDANSETRON 4 MG: 2 INJECTION INTRAMUSCULAR; INTRAVENOUS at 12:36

## 2021-02-12 RX ADMIN — ROCURONIUM BROMIDE 10 MG: 10 INJECTION, SOLUTION INTRAVENOUS at 10:35

## 2021-02-12 RX ADMIN — HYDROMORPHONE HYDROCHLORIDE 0.5 MG: 2 INJECTION, SOLUTION INTRAMUSCULAR; INTRAVENOUS; SUBCUTANEOUS at 13:29

## 2021-02-12 RX ADMIN — Medication 10 ML: at 22:00

## 2021-02-12 RX ADMIN — MAGNESIUM SULFATE HEPTAHYDRATE 1 G: 500 INJECTION, SOLUTION INTRAMUSCULAR; INTRAVENOUS at 10:45

## 2021-02-12 RX ADMIN — ROCURONIUM BROMIDE 40 MG: 10 INJECTION, SOLUTION INTRAVENOUS at 10:45

## 2021-02-12 RX ADMIN — PHENYLEPHRINE HYDROCHLORIDE 100 MCG: 10 INJECTION INTRAVENOUS at 10:59

## 2021-02-12 RX ADMIN — SODIUM CHLORIDE: 9 INJECTION, SOLUTION INTRAVENOUS at 09:38

## 2021-02-12 RX ADMIN — PHENYLEPHRINE HYDROCHLORIDE 100 MCG: 10 INJECTION INTRAVENOUS at 11:22

## 2021-02-12 RX ADMIN — VASOPRESSIN 1 UNITS: 20 INJECTION INTRAVENOUS at 11:16

## 2021-02-12 RX ADMIN — VASOPRESSIN 0.5 UNITS: 20 INJECTION INTRAVENOUS at 11:03

## 2021-02-12 RX ADMIN — ONDANSETRON 4 MG: 2 INJECTION INTRAMUSCULAR; INTRAVENOUS at 10:41

## 2021-02-12 RX ADMIN — MIDAZOLAM 2 MG: 1 INJECTION INTRAMUSCULAR; INTRAVENOUS at 10:29

## 2021-02-12 RX ADMIN — Medication 10 ML: at 21:27

## 2021-02-12 RX ADMIN — CEFAZOLIN SODIUM 2000 MG: 10 INJECTION, POWDER, FOR SOLUTION INTRAVENOUS at 10:26

## 2021-02-12 RX ADMIN — KETOROLAC TROMETHAMINE 15 MG: 15 INJECTION, SOLUTION INTRAMUSCULAR; INTRAVENOUS at 21:49

## 2021-02-12 RX ADMIN — GLYCOPYRROLATE 0.2 MG: 0.2 INJECTION, SOLUTION INTRAMUSCULAR; INTRAVENOUS at 10:29

## 2021-02-12 RX ADMIN — PROMETHAZINE HYDROCHLORIDE 6.25 MG: 25 INJECTION INTRAMUSCULAR; INTRAVENOUS at 13:09

## 2021-02-12 RX ADMIN — GLYCOPYRROLATE 0.2 MG: 0.2 INJECTION, SOLUTION INTRAMUSCULAR; INTRAVENOUS at 10:51

## 2021-02-12 RX ADMIN — HYDROMORPHONE HYDROCHLORIDE 0.5 MG: 1 INJECTION, SOLUTION INTRAMUSCULAR; INTRAVENOUS; SUBCUTANEOUS at 19:34

## 2021-02-12 RX ADMIN — POTASSIUM CHLORIDE, DEXTROSE MONOHYDRATE AND SODIUM CHLORIDE: 150; 5; 450 INJECTION, SOLUTION INTRAVENOUS at 13:15

## 2021-02-12 RX ADMIN — SODIUM CHLORIDE: 9 INJECTION, SOLUTION INTRAVENOUS at 11:21

## 2021-02-12 RX ADMIN — ACETAMINOPHEN 650 MG: 325 TABLET ORAL at 09:38

## 2021-02-12 RX ADMIN — LIDOCAINE HYDROCHLORIDE 50 MG: 20 INJECTION, SOLUTION EPIDURAL; INFILTRATION; INTRACAUDAL; PERINEURAL at 10:35

## 2021-02-12 RX ADMIN — PHENYLEPHRINE HYDROCHLORIDE 100 MCG: 10 INJECTION INTRAVENOUS at 10:53

## 2021-02-12 RX ADMIN — METOCLOPRAMIDE 10 MG: 5 INJECTION, SOLUTION INTRAMUSCULAR; INTRAVENOUS at 21:49

## 2021-02-12 RX ADMIN — OXYCODONE 5 MG: 5 TABLET ORAL at 21:49

## 2021-02-12 RX ADMIN — PROPOFOL 150 MG: 10 INJECTION, EMULSION INTRAVENOUS at 10:35

## 2021-02-12 RX ADMIN — PANTOPRAZOLE SODIUM 40 MG: 40 INJECTION, POWDER, FOR SOLUTION INTRAVENOUS at 15:10

## 2021-02-12 RX ADMIN — SUCCINYLCHOLINE CHLORIDE 100 MG: 20 INJECTION, SOLUTION INTRAMUSCULAR; INTRAVENOUS at 10:36

## 2021-02-12 RX ADMIN — PHENYLEPHRINE HYDROCHLORIDE 100 MCG: 10 INJECTION INTRAVENOUS at 11:51

## 2021-02-12 RX ADMIN — APREPITANT 40 MG: 40 CAPSULE ORAL at 09:38

## 2021-02-12 RX ADMIN — KETOROLAC TROMETHAMINE 30 MG: 60 INJECTION, SOLUTION INTRAMUSCULAR at 12:36

## 2021-02-12 RX ADMIN — PHENYLEPHRINE HYDROCHLORIDE 100 MCG: 10 INJECTION INTRAVENOUS at 10:56

## 2021-02-12 RX ADMIN — PROMETHAZINE HYDROCHLORIDE 6.25 MG: 25 INJECTION, SOLUTION INTRAMUSCULAR; INTRAVENOUS at 13:09

## 2021-02-12 RX ADMIN — VASOPRESSIN 1 UNITS: 20 INJECTION INTRAVENOUS at 11:49

## 2021-02-12 RX ADMIN — GLYCOPYRROLATE 0.5 MG: 0.2 INJECTION, SOLUTION INTRAMUSCULAR; INTRAVENOUS at 12:49

## 2021-02-12 ASSESSMENT — PAIN DESCRIPTION - PAIN TYPE
TYPE: SURGICAL PAIN

## 2021-02-12 ASSESSMENT — PULMONARY FUNCTION TESTS
PIF_VALUE: 20
PIF_VALUE: 20
PIF_VALUE: 17
PIF_VALUE: 26
PIF_VALUE: 20
PIF_VALUE: 33
PIF_VALUE: 48
PIF_VALUE: 23
PIF_VALUE: 22
PIF_VALUE: 11
PIF_VALUE: 19
PIF_VALUE: 12
PIF_VALUE: 17
PIF_VALUE: 33
PIF_VALUE: 32
PIF_VALUE: 39
PIF_VALUE: 19
PIF_VALUE: 33
PIF_VALUE: 2
PIF_VALUE: 0
PIF_VALUE: 32
PIF_VALUE: 22
PIF_VALUE: 33
PIF_VALUE: 21
PIF_VALUE: 26
PIF_VALUE: 24
PIF_VALUE: 22
PIF_VALUE: 33
PIF_VALUE: 22
PIF_VALUE: 25
PIF_VALUE: 32
PIF_VALUE: 22
PIF_VALUE: 25
PIF_VALUE: 20
PIF_VALUE: 20
PIF_VALUE: 36
PIF_VALUE: 0
PIF_VALUE: 22
PIF_VALUE: 1
PIF_VALUE: 23
PIF_VALUE: 31
PIF_VALUE: 25
PIF_VALUE: 32
PIF_VALUE: 21
PIF_VALUE: 33
PIF_VALUE: 24
PIF_VALUE: 23
PIF_VALUE: 20
PIF_VALUE: 32
PIF_VALUE: 20
PIF_VALUE: 1
PIF_VALUE: 17
PIF_VALUE: 22
PIF_VALUE: 18
PIF_VALUE: 32
PIF_VALUE: 23
PIF_VALUE: 21
PIF_VALUE: 29
PIF_VALUE: 22
PIF_VALUE: 29
PIF_VALUE: 24
PIF_VALUE: 20
PIF_VALUE: 32
PIF_VALUE: 1
PIF_VALUE: 21
PIF_VALUE: 23
PIF_VALUE: 0
PIF_VALUE: 32
PIF_VALUE: 20
PIF_VALUE: 20
PIF_VALUE: 21
PIF_VALUE: 23
PIF_VALUE: 24

## 2021-02-12 ASSESSMENT — PAIN SCALES - GENERAL
PAINLEVEL_OUTOF10: 0
PAINLEVEL_OUTOF10: 3
PAINLEVEL_OUTOF10: 7
PAINLEVEL_OUTOF10: 0

## 2021-02-12 ASSESSMENT — PAIN DESCRIPTION - ONSET
ONSET: SUDDEN
ONSET: SUDDEN

## 2021-02-12 ASSESSMENT — PAIN DESCRIPTION - LOCATION
LOCATION: ABDOMEN

## 2021-02-12 ASSESSMENT — PAIN DESCRIPTION - PROGRESSION: CLINICAL_PROGRESSION: NOT CHANGED

## 2021-02-12 ASSESSMENT — PAIN DESCRIPTION - FREQUENCY: FREQUENCY: INTERMITTENT

## 2021-02-12 ASSESSMENT — PAIN DESCRIPTION - DESCRIPTORS: DESCRIPTORS: DISCOMFORT

## 2021-02-12 ASSESSMENT — PAIN - FUNCTIONAL ASSESSMENT: PAIN_FUNCTIONAL_ASSESSMENT: 0-10

## 2021-02-12 NOTE — H&P
[facility]      I have reviewed the history and physical and examined the patient and find no relevant changes. I have reviewed with the patient and/or family the risks, benefits, and alternatives to the procedure.     Bernadette Valerio MD  2/12/2021

## 2021-02-12 NOTE — PROGRESS NOTES
1430: Pt to floor from PACU. Drowsy. VSS. Responds to voice. Assessment complete. Lap sites x5 to abdomen, CD&I and approximated. The care plan and education has been reviewed and mutually agreed upon with the patient. Pt needs expressed, call light within reach, will continue to monitor. 1845: Pt voided 250 mL's. Ambulated with patient around unit, tolerated well.

## 2021-02-12 NOTE — CARE COORDINATION
Patient admitted as Observation/OPIB with an anticipated short hospitalization length of stay. Chart reviewed and it appears that patient has minimal needs for discharge at this time. Discussed with patients nurse and requested that case management be notified if discharge needs are identified. *Case management will continue to follow progress and update discharge plan as needed.     Electronically signed by FRANSICO Alvares on 2/12/2021 at 3:30 PM

## 2021-02-12 NOTE — PROGRESS NOTES
Incentive Spirometry education and demonstration completed by Respiratory Therapy Yes      Response to education: Very Good     Teaching Time: 5 minutes    Minimum Predicted Vital Capacity - 570 mL. Patient's Actual Vital Capacity - 300 mL.  Turning over to Nursing for routine follow-up No.    Comments: continue spirometer q2h w/a    Electronically signed by Danielle Piña RCP on 2/12/2021 at 5:28 PM

## 2021-02-12 NOTE — BRIEF OP NOTE
Brief Postoperative Note      Patient: Ania Ennis  YOB: 1965  MRN: [id number]    Date of Procedure: 2/12/2021    Pre-Op Diagnosis: K44.9  PARAESOPHAGEAL HIATAL HERNIA    Post-Op Diagnosis: Same       Procedure(s):  LAPAROSCOPIC PARAESOPHAGEAL HIATAL HERNIA REPAIR, NISSEN FUNDOPLICATION    Surgeon(s):  Sarah Barker MD    Assistant:  Surgical Assistant: Tiff Hooks; Fabien Blanchard RN; [facility]    Anesthesia: General    Estimated Blood Loss (mL): Minimal    Complications: None    Specimens:   ID Type Source Tests Collected by Time Destination   A : A) [facility] Sarah Barker MD 2/12/2021 1237        Implants:  Implant Name Type Inv. Item Serial No.  Lot No. LRB No. Used Action   MESH TREV [id number] SYN TISS REINF BIOABSRB DISP BIO-A  MESH TREV [id number] SYN TISS REINF BIOABSRB DISP BIO-A   [employer] [id number] N/A 1 Implanted         Drains:   Urethral Catheter Non-latex 16 fr (Active)       Findings: Large paraesophageal HH reduced and repaired, Nissen completed.     Electronically signed by Sarah Barker MD on 2/12/2021 at 12:46 PM

## 2021-02-12 NOTE — ANESTHESIA PRE PROCEDURE
Department of Anesthesiology  Preprocedure Note       Name:  Francisco Enrique   Age:  64 y.o.  :  1965                                          MRN:  [id number]         Date:  2021      Surgeon: Brenda Linares):  Cj Renee MD    Procedure: Procedure(s):  LAPAROSCOPIC PARAESOPHAGEAL HIATAL HERNIA REPAIR, NISSEN FUNDOPLICATION    Medications prior to admission:   Prior to Admission medications    Medication Sig Start Date End Date Taking? Authorizing Provider   DULoxetine (CYMBALTA) 60 MG extended release capsule Take 1 capsule by mouth daily Take with 30 mg 2/10/21  Yes Mynor Parry DO   busPIRone (BUSPAR) 5 MG tablet TAKE 1 TABLET BY MOUTH 3 TIMES A DAY AS NEEDED FOR ANXIETY 21  Yes Mynor Parry DO   DULoxetine (CYMBALTA) 30 MG extended release capsule Take 1 capsule by mouth daily Take with 60 mg Cymbalta 21 Yes Mynor Parry DO   metoprolol tartrate (LOPRESSOR) 50 MG tablet Take 1 tablet by mouth 2 times daily 21  Yes Mynor Parry DO   famotidine (PEPCID) 40 MG tablet Take 1 tablet by mouth every evening 20  Yes Mynor Parry DO   sulfaSALAzine (AZULFIDINE) 500 MG tablet Take 1 tablet by mouth 2 times daily 20 Yes Jakob Sahu MD   amitriptyline (ELAVIL) 50 MG tablet Take 1 tablet by mouth nightly 12/15/20  Yes Mynor Parry DO   sucralfate (CARAFATE) 1 GM tablet TAKE 1 TABLET BY MOUTH 4 TIMES A DAY 20  Yes Mynor Parry DO   pantoprazole (PROTONIX) 40 MG tablet Take 1 tablet by mouth daily 20  Yes Mynor Parry DO   Cholecalciferol (VITAMIN D3) 2000 units CAPS Take by mouth   Yes Historical Provider, MD   clonazePAM (KLONOPIN) 0.5 MG tablet 1/2-1 po q 8 hours prn severe anxiety 20  Mynor Parry DO   SUMAtriptan (IMITREX) 100 MG tablet Take 1 tablet by mouth once as needed for Migraine May repeat once after 2 hours if needed. No more than 2 per 24 hour period.  3/9/20 3/9/20 Alecia Huang DO   ondansetron (ZOFRAN ODT) 8 MG TBDP disintegrating tablet Place 1 tablet under the tongue every 8 hours as needed for Nausea, Vomiting or Other (migraines) 3/9/20   Alecia Huang DO       Current medications:    Current Facility-Administered Medications   Medication Dose Route Frequency Provider Last Rate Last Admin    HYDROcodone-acetaminophen (NORCO) 5-325 MG per tablet 1 tablet  1 tablet Oral Once PRN Alicia Yousif MD        ondansetron [facility] injection 4 mg  4 mg Intravenous Once PRN Alicia Yousif MD        HYDROmorphone (DILAUDID) injection 0.25 mg  0.25 mg Intravenous Q5 Min PRN Alicia Yousif MD        HYDROmorphone (DILAUDID) injection 0.5 mg  0.5 mg Intravenous Q5 Min PRN Alicia Yousif MD           Allergies: Allergies   Allergen Reactions    Morphine Other (See Comments)     Chest tightness       Problem List:    Patient Active Problem List   Diagnosis Code    DDD (degenerative disc disease), lumbar M51.36    Fibromyalgia M79.7    Psoriatic arthritis ([facility] [address] L40.50    Pre-diabetes R73.03    Essential hypertension I10    KARTHIK (obstructive sleep apnea) G47.33    Chronic depression F32.9    Vitamin D insufficiency E55.9    Mild hyperlipidemia E78.5    Fatty liver K76.0    Irritable bowel syndrome with constipation K58.1    Neck pain M54.2    Epigastric pain R10.13    Pharyngitis due to Streptococcus species J02.0    Migraine with aura and without status migrainosus, not intractable G43. 109    Dry eyes H04.123    Blurry vision H53.8    Dry mouth R68.2    Urticaria L50.9    Anxiety F41.9    Stress due to family tension Z63.8    Moderate episode of recurrent major depressive disorder (HCC) F33.1    FRANCISCA (generalized anxiety disorder) F41.1    History of 2019 novel coronavirus disease (COVID-19) Z86.16       Past Medical History:        Diagnosis Date    Chronic depression     DDD (degenerative disc disease), lumbar     Fibromyalgia  HTN (hypertension)     KARTHIK (obstructive sleep apnea)     Psoriatic arthritis ([facility] [address]        Past Surgical History:        Procedure Laterality Date    BLADDER SUSPENSION  2004     SECTION  08/31/1987    x1    CHOLECYSTECTOMY  2000    ENDOMETRIAL ABLATION  2007    HYSTERECTOMY, TOTAL ABDOMINAL  2009    heavy menses    NASAL SEPTUM SURGERY  2005    NERVE SURGERY Bilateral 2019    BILATERAL L4-5 AND L5-S1 LUMBAR FACET INJECTIONS WITH FLUOROSCOPY performed by Emerald Oliver MD at [place] [address]       Social History:    Social History     Tobacco Use    Smoking status: Never Smoker    Smokeless tobacco: Never Used   Substance Use Topics    Alcohol use: Yes     Comment: occasional                                 Counseling given: Not Answered      Vital Signs (Current):   Vitals:    21 0844 21 09   BP:  (!) 157/77   Pulse:  73   Resp:  16   Temp:  98.8 °F (37.1 °C)   TempSrc:  Temporal   SpO2:  98%   Weight: 195 lb (88.5 kg) 198 lb 6.4 oz (90 kg)   Height: 5' 5\" (1.651 m) 5' 5\" (1.651 m)                                              BP Readings from Last 3 Encounters:   21 (!) 157/77   21 117/80   20 132/82       NPO Status: Time of last liquid consumption:                         Time of last solid consumption:                         Date of last liquid consumption: 21                        Date of last solid food consumption: 21    BMI:   Wt Readings from Last 3 Encounters:   21 198 lb 6.4 oz (90 kg)   21 199 lb 8 oz (90.5 kg)   20 195 lb 6.4 oz (88.6 kg)     Body mass index is 33.02 kg/m².     CBC:   Lab Results   Component Value Date    WBC 3.9 2021    RBC 4.31 2021    HGB 13.1 2021    HCT 40.3 2021    MCV 93.4 2021    RDW 14.1 2021     2021       CMP:   Lab Results   Component Value Date     2020    K 4.4 2020     2020 CO2 25 09/25/2020    BUN 14 09/25/2020    CREATININE 0.8 09/25/2020    GFRAA >60 09/25/2020    AGRATIO 2.6 09/25/2020    LABGLOM >60 09/25/2020    GLUCOSE 91 09/25/2020    PROT 5.8 12/11/2020    CALCIUM 9.4 09/25/2020    BILITOT <0.2 12/11/2020    ALKPHOS 71 12/11/2020    AST 19 12/11/2020    ALT 17 12/11/2020       POC Tests: No results for input(s): POCGLU, POCNA, POCK, POCCL, POCBUN, POCHEMO, POCHCT in the last 72 hours. Coags: No results found for: PROTIME, INR, APTT    HCG (If Applicable): No results found for: PREGTESTUR, PREGSERUM, HCG, HCGQUANT     ABGs: No results found for: PHART, PO2ART, IQV8NGU, YPQ0GBH, BEART, C5AVSWLJ     Type & Screen (If Applicable):  No results found for: LABABO, LABRH    Drug/Infectious Status (If Applicable):  No results found for: HIV, HEPCAB    COVID-19 Screening (If Applicable):   Lab Results   Component Value Date    COVID19 Not Detected 02/08/2021    COVID19 DETECTED 10/28/2020         Anesthesia Evaluation  Patient summary reviewed no history of anesthetic complications:   Airway: Mallampati: II  TM distance: >3 FB   Neck ROM: full  Mouth opening: > = 3 FB Dental: normal exam         Pulmonary: breath sounds clear to auscultation  (+) sleep apnea: on noncompliant,                            ROS comment: Uses albuterol 1-2 times per year- when laughs too much   Cardiovascular:  Exercise tolerance: good (>4 METS),   (+) hypertension:, dysrhythmias (occ. palpitations, chronic, stable, no associated diziness or syncope):,         Rhythm: regular  Rate: normal                    Neuro/Psych:   (+) neuromuscular disease (fibromyalgia):, headaches: migraine headaches, psychiatric history:             ROS comment: fibromyalgia GI/Hepatic/Renal:   (+) GERD:, liver disease (fatty liver):,          ROS comment: No gerd sx today, no n/v today.    Endo/Other:    (+) : arthritis (psoraiatic):., .                 Abdominal:           Vascular:

## 2021-02-12 NOTE — PLAN OF CARE
Problem: Pain:  Goal: Pain level will decrease  Description: Pain level will decrease  Outcome: Ongoing  Goal: Control of acute pain  Description: Control of acute pain  Outcome: Ongoing  Goal: Control of chronic pain  Description: Control of chronic pain  Outcome: Ongoing     Problem: HEMODYNAMIC STATUS  Goal: Patient has stable vital signs and fluid balance  Outcome: Ongoing     Problem: OXYGENATION/RESPIRATORY FUNCTION  Goal: Patient will achieve/maintain normal respiratory rate/effort  Outcome: Ongoing     Problem: MOBILITY  Goal: Early mobilization is achieved  Outcome: Ongoing     Problem: ELIMINATION  Goal: Elimination patterns are normal or improving  Description: Elimination patterns return to pre-surgery normal patterns  Outcome: Ongoing     Problem: SKIN INTEGRITY  Goal: Skin integrity is maintained or improved  Outcome: Ongoing

## 2021-02-13 VITALS
OXYGEN SATURATION: 95 % | TEMPERATURE: 97.7 F | SYSTOLIC BLOOD PRESSURE: 132 MMHG | RESPIRATION RATE: 12 BRPM | HEART RATE: 88 BPM | HEIGHT: 65 IN | WEIGHT: 198.4 LBS | BODY MASS INDEX: 33.05 KG/M2 | DIASTOLIC BLOOD PRESSURE: 75 MMHG

## 2021-02-13 LAB
BASOPHILS ABSOLUTE: 0.1 K/UL (ref 0–0.2)
BASOPHILS RELATIVE PERCENT: 0.4 %
EOSINOPHILS ABSOLUTE: 0 K/UL (ref 0–0.6)
EOSINOPHILS RELATIVE PERCENT: 0 %
HCT VFR BLD CALC: 38.9 % (ref 36–48)
HEMOGLOBIN: 12.8 G/DL (ref 12–16)
LYMPHOCYTES ABSOLUTE: 0.9 K/UL (ref 1–5.1)
LYMPHOCYTES RELATIVE PERCENT: 6.8 %
MCH RBC QN AUTO: 31.1 PG (ref 26–34)
MCHC RBC AUTO-ENTMCNC: 32.8 G/DL (ref 31–36)
MCV RBC AUTO: 94.9 FL (ref 80–100)
MONOCYTES ABSOLUTE: 0.6 K/UL (ref 0–1.3)
MONOCYTES RELATIVE PERCENT: 4.6 %
NEUTROPHILS ABSOLUTE: 11.5 K/UL (ref 1.7–7.7)
NEUTROPHILS RELATIVE PERCENT: 88.2 %
PDW BLD-RTO: 14.7 % (ref 12.4–15.4)
PLATELET # BLD: 348 K/UL (ref 135–450)
PMV BLD AUTO: 7.2 FL (ref 5–10.5)
RBC # BLD: 4.1 M/UL (ref 4–5.2)
WBC # BLD: 13.1 K/UL (ref 4–11)

## 2021-02-13 PROCEDURE — 96372 THER/PROPH/DIAG INJ SC/IM: CPT

## 2021-02-13 PROCEDURE — 96375 TX/PRO/DX INJ NEW DRUG ADDON: CPT

## 2021-02-13 PROCEDURE — 85025 COMPLETE CBC W/AUTO DIFF WBC: CPT

## 2021-02-13 PROCEDURE — 6370000000 HC RX 637 (ALT 250 FOR IP): Performed by: SURGERY

## 2021-02-13 PROCEDURE — C9113 INJ PANTOPRAZOLE SODIUM, VIA: HCPCS | Performed by: SURGERY

## 2021-02-13 PROCEDURE — G0378 HOSPITAL OBSERVATION PER HR: HCPCS

## 2021-02-13 PROCEDURE — 96374 THER/PROPH/DIAG INJ IV PUSH: CPT

## 2021-02-13 PROCEDURE — 96376 TX/PRO/DX INJ SAME DRUG ADON: CPT

## 2021-02-13 PROCEDURE — 99024 POSTOP FOLLOW-UP VISIT: CPT | Performed by: SURGERY

## 2021-02-13 PROCEDURE — 36415 COLL VENOUS BLD VENIPUNCTURE: CPT

## 2021-02-13 PROCEDURE — 2580000003 HC RX 258: Performed by: SURGERY

## 2021-02-13 PROCEDURE — 6360000002 HC RX W HCPCS: Performed by: SURGERY

## 2021-02-13 RX ADMIN — ACETAMINOPHEN 650 MG: 325 TABLET ORAL at 02:30

## 2021-02-13 RX ADMIN — Medication 10 ML: at 13:45

## 2021-02-13 RX ADMIN — PANTOPRAZOLE SODIUM 40 MG: 40 INJECTION, POWDER, FOR SOLUTION INTRAVENOUS at 09:01

## 2021-02-13 RX ADMIN — KETOROLAC TROMETHAMINE 15 MG: 15 INJECTION, SOLUTION INTRAMUSCULAR; INTRAVENOUS at 07:30

## 2021-02-13 RX ADMIN — ENOXAPARIN SODIUM 40 MG: 40 INJECTION, SOLUTION INTRAVENOUS; SUBCUTANEOUS at 09:00

## 2021-02-13 RX ADMIN — HYDROMORPHONE HYDROCHLORIDE 0.5 MG: 1 INJECTION, SOLUTION INTRAMUSCULAR; INTRAVENOUS; SUBCUTANEOUS at 07:31

## 2021-02-13 RX ADMIN — KETOROLAC TROMETHAMINE 15 MG: 15 INJECTION, SOLUTION INTRAMUSCULAR; INTRAVENOUS at 13:44

## 2021-02-13 RX ADMIN — OXYCODONE 5 MG: 5 TABLET ORAL at 12:24

## 2021-02-13 RX ADMIN — OXYCODONE 5 MG: 5 TABLET ORAL at 02:30

## 2021-02-13 RX ADMIN — ACETAMINOPHEN 650 MG: 325 TABLET ORAL at 14:05

## 2021-02-13 RX ADMIN — ACETAMINOPHEN 650 MG: 325 TABLET ORAL at 09:01

## 2021-02-13 RX ADMIN — METOCLOPRAMIDE 10 MG: 5 INJECTION, SOLUTION INTRAMUSCULAR; INTRAVENOUS at 02:29

## 2021-02-13 ASSESSMENT — PAIN SCALES - GENERAL
PAINLEVEL_OUTOF10: 0
PAINLEVEL_OUTOF10: 5
PAINLEVEL_OUTOF10: 7
PAINLEVEL_OUTOF10: 5
PAINLEVEL_OUTOF10: 4
PAINLEVEL_OUTOF10: 4

## 2021-02-13 ASSESSMENT — PAIN DESCRIPTION - DESCRIPTORS
DESCRIPTORS: DISCOMFORT

## 2021-02-13 ASSESSMENT — PAIN DESCRIPTION - LOCATION
LOCATION: ABDOMEN

## 2021-02-13 ASSESSMENT — PAIN DESCRIPTION - PAIN TYPE
TYPE: SURGICAL PAIN

## 2021-02-13 ASSESSMENT — PAIN DESCRIPTION - FREQUENCY
FREQUENCY: INTERMITTENT
FREQUENCY: INTERMITTENT

## 2021-02-13 ASSESSMENT — PAIN DESCRIPTION - PROGRESSION: CLINICAL_PROGRESSION: NOT CHANGED

## 2021-02-13 NOTE — OP NOTE
[facility]                     [address] [facility], [address] [place] [address]                                OPERATIVE REPORT    PATIENT NAME: Edwin Allen                   :        1965  MED REC NO:   [id number]                          ROOM:       4529  ACCOUNT NO:   [de-identified]                           ADMIT DATE: 2021  PROVIDER:     Lieutenant Kristal MD    DATE OF PROCEDURE:  2021    PREOPERATIVE DIAGNOSIS:  Paraesophageal hiatal hernia. POSTOPERATIVE DIAGNOSIS:  Paraesophageal hiatal hernia. PROCEDURE:  Laparoscopic paraesophageal hiatal hernia repair, [place] Bio-A  mesh placement, and Nissen fundoplication. SURGEON:  Lieutenant Kristal MD    ANESTHESIA:  General plus local at port sites as well. ESTIMATED BLOOD LOSS:  Minimal.    COMPLICATIONS:  None. SPECIMENS:  Hernia sac. INDICATION:  The patient is a 59-year-old female presenting with  paraesophageal hiatal hernia for repair today. She has GERD symptoms  and most predominantly dysphagia and odynophagia. The patient was met  in the preoperative holding bay. All questions regarding surgery were  answered and she consents to proceed. Antibiotics were ordered and  administered. ADDITIONAL DETAILS OF SURGERY:  The patient was brought to the operating  room, placed on the operating table in supine position. Compression  boots were placed. General anesthetic was administered. Lithotomy  position was established and the abdomen was prepped and draped  sterilely. Time-out was done. Supraumbilical 5-mm direct entry view  port was placed and the abdominal cavity was insufflated to 15 mmHg  pressure. Right lateral abdominal 5-mm port, right paramedian 5-mm  port, left paramedian 11-mm port, and left lateral abdominal 5-mm port  were placed under direct vision. Liver retractor was then placed and  the hiatus was widely exposed.   There was a large hiatal hernia present  with approximately half the stomach up in the chest including a large  amount of perigastric omental fat, a large hernia sac and the posterior  hernia fat as well from the retroperitoneum. Dissection began with the  gastrohepatic ligament and opening the plain up to the right crura. The  right crura was dissected arching over the top. The plane of the hernia  sac was entered and it was taken on the left side as well. Adhesions to  the left diaphragm were then taken down and we entered the left chest  and reflected the remainder of the hernia sac and stomach down at this  time. Attention was then turned back to the right side and the crura  was dissected further up in the mediastinum and the remainder of the  right-sided dissection was completed. Posterior vagus nerve was  identified down inferiorly on this side as was the anterior vagus nerve  on the top side and carefully preserved. Mediastinal dissection was  carried out mobilizing the esophagus way high at the top of the thoracic  inlet. It was firmly adherent down to the aorta posteriorly and this  was all dissected up and freed to obtain intra-abdominal esophageal  length. Once all this was done, about 4 cm of intra-abdominal esophagus  was obtained. Short gastric vessels were taken down and the posterior attachments to  the crura and the herniated retroperitoneal fat were then all pulled  down into the abdomen as well. The posterior window was adequate size  for the eventual fundoplication to be done. Again, both nerves were  checked and were carefully preserved. We checked up in the mediastinum a final time and all appeared  hemostatic and the esophagus was mobilized completely. The hiatal hernia repair was then performed. Interrupted 0-Ethibond  sutures were placed using the AutoSuture suturing device, assuring each  one with a tie-knot clip.   These were all placed posteriorly and done to  the size of 56-Gambian bougie with the ability to admit a finger  laparoscopic grasper through the hiatus as well. Following this, the  [place] Bio-A mesh was cut to size and sutured into position to reinforce  repair. This was positioned and sewn inferiorly to the common crura  closure, to the right crura, to the left crura with the 0-Ethibond  suture secured with the tie-knot clip at each site. The Nissen fundoplication [id number] wrap was then performed. The  fundus was passed retroesophageally and over the 56-Yor[place] bougie, the  wrap was performed by taking a bite of the left side of the fundus, the  area of the GE junction, and the fundus passed retroesophageally. Each  0-Ethibond was secured with the tie-knot clip. A total of four sutures  were placed. The wrap was 2.5 cm in length. The surgical sites were all re-inspected and all appeared hemostatic. The liver retractor was removed. The undersurface of the liver was  rechecked and this appeared fine as well. The 11-mm port was removed and the fascia was closed at this site with  the PMI catheter and 0 Vicryl ties x2. All ports were removed. The  skin was closed at all sites with 4-0 Monocryl suture. The hernia sac  was sent to pathology. Dermabond glue was placed on the skin incisions  and the patient was taken to the recovery room in stable condition  postop.         Suad Krishnamurthy MD    D: 02/12/2021 13:07:55       T: 02/12/2021 13:18:45     [email or web address]  Job#: [id number]     Doc#: [id number]    CC:  MD Desirae Fagan MD

## 2021-02-13 NOTE — PROGRESS NOTES
Christy Watts is a 64 y.o. female patient. Current Facility-Administered Medications   Medication Dose Route Frequency Provider Last Rate Last Admin    dextrose 5 % and 0.45 % NaCl with KCl 20 mEq infusion   Intravenous Continuous Phani Terrell MD 75 mL/hr at 02/12/21 2202 New Bag at 02/12/21 2202    sodium chloride flush 0.9 % injection 10 mL  10 mL Intravenous 2 times per day Phani Terrell MD   10 mL at 02/12/21 2127    sodium chloride flush 0.9 % injection 10 mL  10 mL Intravenous PRN Phani Terrell MD   10 mL at 02/12/21 2200    enoxaparin (LOVENOX) injection 40 mg  40 mg Subcutaneous Daily Phani Terrell MD   40 mg at 02/13/21 0900    acetaminophen (TYLENOL) tablet 650 mg  650 mg Oral Q6H Phani Terrell MD   650 mg at 02/13/21 0901    oxyCODONE (ROXICODONE) immediate release tablet 5 mg  5 mg Oral Q4H PRN Phani Terrell MD   5 mg at 02/13/21 0230    HYDROmorphone HCl PF (DILAUDID) injection 0.25 mg  0.25 mg Intravenous Q3H PRN Phani Terrell MD        Or    HYDROmorphone HCl PF (DILAUDID) injection 0.5 mg  0.5 mg Intravenous Q3H PRN Phani Terrell MD   0.5 mg at 02/13/21 0731    pantoprazole (PROTONIX) injection 40 mg  40 mg Intravenous Daily Phani Terrell MD   40 mg at 02/13/21 0901    ketorolac (TORADOL) injection 15 mg  15 mg Intravenous Q6H PRN Phani Terrell MD   15 mg at 02/13/21 0730     Allergies   Allergen Reactions    Morphine Other (See Comments)     Chest tightness     Active Problems:    Paraesophageal hiatal hernia  Resolved Problems:    * No resolved hospital problems. *    Blood pressure 128/72, pulse 84, temperature 98.1 °F (36.7 °C), temperature source Oral, resp. rate 14, height 5' 5\" (1.651 m), weight 198 lb 6.4 oz (90 kg), SpO2 95 %. Subjective:  Symptoms:  Stable. Diet:  Adequate intake. Activity level: Normal.    Pain:  She complains of pain that is mild. Objective:  General Appearance:  Comfortable. Vital signs: (most recent): Blood pressure 128/72, pulse 84, temperature 98.1 °F (36.7 °C), temperature source Oral, resp. rate 14, height 5' 5\" (1.651 m), weight 198 lb 6.4 oz (90 kg), SpO2 95 %. Output: Producing urine. Lungs:  Normal effort and normal respiratory rate. Abdomen: Abdomen is soft and non-distended. (Incisions intact). Neurological: Patient is alert and oriented to person, place and time. Skin:  Warm. no leg tenderness    Assessment & Plan POD # 1 S/P lap HH repair. Doing well. Had O2 sats of 89% on RA this am. She appears comfortable. Will advance to full liquids. Incentive spirometry/ambulate today. Monitor O2 sats. Possible pm discharge.      Sathya Powers MD  2/13/2021

## 2021-02-13 NOTE — PROGRESS NOTES
Incentive Spirometry education and demonstration completed by Respiratory Therapy Yes     Response to education: Very Good     Teaching Time: 5 minutes    Minimum Predicted Vital Capacity - 570mL. Patient's Actual Vital Capacity - 1000 mL. Turning over to Nursing for routine follow-up Yes.     Comments: continue spirometer q2h w/a    Electronically signed by Isaias Vigil RCP on 2/12/2021 at 9:03 PM2y

## 2021-02-13 NOTE — PLAN OF CARE
Problem: Pain:  Goal: Pain level will decrease  Description: Pain level will decrease  Outcome: Completed  Goal: Control of acute pain  Description: Control of acute pain  Outcome: Completed  Goal: Control of chronic pain  Description: Control of chronic pain  Outcome: Completed     Problem: HEMODYNAMIC STATUS  Goal: Patient has stable vital signs and fluid balance  Outcome: Completed     Problem: OXYGENATION/RESPIRATORY FUNCTION  Goal: Patient will achieve/maintain normal respiratory rate/effort  Outcome: Completed     Problem: MOBILITY  Goal: Early mobilization is achieved  Outcome: Completed     Problem: ELIMINATION  Goal: Elimination patterns are normal or improving  Description: Elimination patterns return to pre-surgery normal patterns  Outcome: Completed     Problem: SKIN INTEGRITY  Goal: Skin integrity is maintained or improved  Outcome: Completed

## 2021-02-13 NOTE — PROGRESS NOTES
Shift assessment completed. VSS. 5 lap sites CDI and well approximated. Pt. Reports that she walked earlier in the day. Encouraged increased ambulation, use of IS coughing & deep breathing. Reports tolerating clears and passing gas. Reviewed POC & evening meds. No questions, given per order, see MAR. The care plan and education has been reviewed and mutually agreed upon with the patient.

## 2021-02-13 NOTE — PROGRESS NOTES
0930  Am assessment complete. Patient is hopeful to go home today. Sats are 89% on RA while awake. Placed on 2 liters of oxygen. Encouraged frequent IS use. Ambulated with patient in the hallway. Patient tolerated it well. The care plan and education has been reviewed and mutually agreed upon with the patient. 10:23 AM  Sats up to 95% on RA after ambulating and using the IS.     3:50 PM  Patient has been up ambulating the hallway frequently and using her IS. Maintaining Sats >92% on RA. OK for discharge to home. Reviewed discharge instructions with patient. Patient verbalized understanding and denies any questions. Script for pain medication given to patient. 3:53 PM  Patient discharged to home with all belongings.

## 2021-02-13 NOTE — CARE COORDINATION
Discharge Planning Note:    Patient admitted as Observation/OPIB with an anticipated short hospitalization length of stay. Chart reviewed and it appears that patient has minimal needs for discharge at this time. Nursing can notify case management if dc needs are identified. DC order on chart. Case management will continue to follow progress and update discharge plan as needed.     Kathleen Harvey RN BSN  Case Management  [phone]

## 2021-02-15 ENCOUNTER — CARE COORDINATION (OUTPATIENT)
Dept: CASE MANAGEMENT | Age: 56
End: 2021-02-15

## 2021-02-15 ENCOUNTER — TELEPHONE (OUTPATIENT)
Dept: SURGERY | Age: 56
End: 2021-02-15

## 2021-02-15 NOTE — DISCHARGE SUMMARY
Surgery Discharge Summary    Patient Identification  Francisco Mercedes is a 64 y.o. female. :  1965  Admit Date:  2021    Discharge date:   2021  4:01 PM                                   Disposition: home    Discharge Diagnoses:   Patient Active Problem List   Diagnosis    DDD (degenerative disc disease), lumbar    Fibromyalgia    [facility] [address]    Pre-diabetes    Essential hypertension    KARTHIK (obstructive sleep apnea)    Chronic depression    Vitamin D insufficiency    Mild hyperlipidemia    Fatty liver    Irritable bowel syndrome with constipation    Neck pain    Epigastric pain    Pharyngitis due to Streptococcus species    Migraine with aura and without status migrainosus, not intractable    Dry eyes    Blurry vision    Dry mouth    Urticaria    Anxiety    Stress due to family tension    Moderate episode of recurrent major depressive disorder ([facility] [address]    FRANCISCA (generalized anxiety disorder)    History of 2019 novel coronavirus disease (COVID-19)    Paraesophageal hiatal hernia       CONDITION: excellent    Consults: none    Surgery: Laparoscopic paraesophageal hiatal hernia repair    Patient Instructions: Activity: no heavy lifting, pushing, pulling for 6 weeks, no driving for 1 week or while on analgesics  Diet: As tolerated  Follow-up with Dr. Dora Earl in 2 weeks.   May shower    Discharge Medications:      Feng Nelson\"   Home Medication Instructions BRAD:[id number]    Printed on:02/15/21 [id number]   Medication Information                      amitriptyline (ELAVIL) 50 MG tablet  Take 1 tablet by mouth nightly             busPIRone (BUSPAR) 5 MG tablet  TAKE 1 TABLET BY MOUTH 3 TIMES A DAY AS NEEDED FOR ANXIETY             Cholecalciferol (VITAMIN D3) 2000 units CAPS  Take by mouth             clonazePAM (KLONOPIN) 0.5 MG tablet  1/2-1 po q 8 hours prn severe anxiety             DULoxetine (CYMBALTA) 30 MG extended release capsule  Take 1 capsule by mouth daily Take with 60 mg Cymbalta             DULoxetine (CYMBALTA) 60 MG extended release capsule  Take 1 capsule by mouth daily Take with 30 mg             famotidine (PEPCID) 40 MG tablet  Take 1 tablet by mouth every evening             HYDROcodone-acetaminophen (NORCO) 5-325 MG per tablet  Take 1 tablet by mouth every 4 hours as needed for Pain for up to 7 days. metoprolol tartrate (LOPRESSOR) 50 MG tablet  Take 1 tablet by mouth 2 times daily             ondansetron (ZOFRAN ODT) 8 MG TBDP disintegrating tablet  Place 1 tablet under the tongue every 8 hours as needed for Nausea, Vomiting or Other (migraines)             pantoprazole (PROTONIX) 40 MG tablet  Take 1 tablet by mouth daily             sucralfate (CARAFATE) 1 GM tablet  TAKE 1 TABLET BY MOUTH 4 TIMES A DAY             sulfaSALAzine (AZULFIDINE) 500 MG tablet  Take 1 tablet by mouth 2 times daily             SUMAtriptan (IMITREX) 100 MG tablet  Take 1 tablet by mouth once as needed for Migraine May repeat once after 2 hours if needed. No more than 2 per 24 hour period. HPI and Hospital Course:     Pt admitted and underwent a lap HH repair. She tolerated this surgery and the post op period well. She is discharged to home with normal a temperature and vitals, tolerating an oral diet well, and having normal bowel activity. Follow up is planned, and all questions have been answered.     [facility] and Laparoscopic Surgery

## 2021-02-15 NOTE — CARE COORDINATION
[facility] Transitions Initial Follow Up Call    Call within 2 business days of discharge: Yes    Patient: Francisco Mercedes Patient : 1965   MRN: [id number]  Reason for Admission:   Discharge Date: 21 RARS: No data recorded    Last Discharge [facility]       Complaint Diagnosis Description Type Department Provider    21  Paraesophageal hiatal hernia Admission (Discharged) Eliana Linda MD           Non-face-to-face services provided:  Obtained and reviewed discharge summary and/or continuity of care documents      Challenges to be reviewed by the provider   Additional needs identified to be addressed with provider No  none    Discussed COVID-19 related testing which was not done at this time. Test results were not done. Patient informed of results, if available? No          Method of communication with provider : none    Advance Care Planning:   Does patient have an Advance Directive:  not on file; education provided. Was this a readmission? No  Patient stated reason for admission: scheduled surgery  Patients top risk factors for readmission: medical condition    Care Transition Nurse (CTN) contacted the patient by telephone to perform post hospital discharge assessment. Verified name and  with patient as identifiers. Provided introduction to self, and explanation of the CTN role. CTN reviewed discharge instructions, medical action plan and red flags with patient who verbalized understanding. Patient given an opportunity to ask questions and does not have any further questions or concerns at this time. Were discharge instructions available to patient? Yes. Reviewed appropriate site of care based on symptoms and resources available to patient including: When to call 911. The patient agrees to contact the PCP office for questions related to their healthcare.      Medication reconciliation was performed with patient, who verbalizes understanding of administration of home medications. Advised obtaining a 90-day supply of all daily and as-needed medications. Covid Risk Education    Patient has following risk factors of: no known risk factors. Education provided regarding infection prevention, and signs and symptoms of COVID-19 and when to seek medical attention with patient who verbalized understanding. Discussed exposure protocols and quarantine From CDC: Are you at higher risk for severe illness?   and given an opportunity for questions and concerns. The patient agrees to contact the COVID-19 hotline [phone] or PCP office for questions related to COVID-19. For more information on steps you can take to protect yourself, see CDC's How to Protect Yourself     Discussed follow-up appointments. If no appointment was previously scheduled, appointment scheduling offered: Yes. Is follow up appointment scheduled within 7 days of discharge? No  Non-[facility] follow up appointment(s): no    Plan for follow-up call in 5-7 days based on severity of symptoms and risk factors. Plan for next call: self management-s/p surgery     Pt states doing well, no issues or concerns except norco was causing her a headache so called MD office,now alternating ibuprofen and tylenol. Incisions are CDI. F/U appt listed below. Agreed to more CTC f/u calls. CTN provided contact information for future needs.     Follow Up  Future Appointments   Date Time Provider Precious Valladares   2/25/2021 10:00 AM Phani Terrell MD [facility]       Jemma Sims RN

## 2021-02-15 NOTE — TELEPHONE ENCOUNTER
Pt called this morning stating that the Norco is causing bad headaches when she takes it. She asked if she can take Ibuprofen. Gave her instructions to stop the Norco and to take ibuprofen 800 mg every 6 hrs and tylenol 1000 mg every 3 hrs alternating.

## 2021-02-18 ENCOUNTER — TELEPHONE (OUTPATIENT)
Dept: SURGERY | Age: 56
End: 2021-02-18

## 2021-02-18 NOTE — TELEPHONE ENCOUNTER
Talked to [facility], said this is normal. Slow deep breaths to help stretch the diaphragm because the area is tight from surgery and use ice on the area as well. Informed pt, said her sister told her the same thing - this is what [facility] told them before leaving the hospital.   She will call if any more concerns.

## 2021-02-22 ENCOUNTER — CARE COORDINATION (OUTPATIENT)
Dept: CASE MANAGEMENT | Age: 56
End: 2021-02-22

## 2021-02-22 NOTE — CARE COORDINATION
[facility] Transitions Follow Up Call    2021    Patient: Arnold Abbott  Patient : 1965   MRN: [id number]  Reason for Admission:   Discharge Date: 21 RARS: No data recorded       Spoke with: Arnold Abbott    Pt states doing  better, pain much improved. F/U appt listed below.  Agreed to more CTC f/u calls      Follow Up  Future Appointments   Date Time Provider Precious Valladares   2021 10:00 AM Bernadine Park MD [facility] SURG KIERA Contreras, RN

## 2021-02-25 ENCOUNTER — OFFICE VISIT (OUTPATIENT)
Dept: SURGERY | Age: 56
End: 2021-02-25

## 2021-02-25 VITALS
SYSTOLIC BLOOD PRESSURE: 106 MMHG | DIASTOLIC BLOOD PRESSURE: 60 MMHG | WEIGHT: 192 LBS | TEMPERATURE: 96.5 F | BODY MASS INDEX: 31.95 KG/M2

## 2021-02-25 DIAGNOSIS — K44.9 PARAESOPHAGEAL HIATAL HERNIA: Primary | ICD-10-CM

## 2021-02-25 PROCEDURE — 99024 POSTOP FOLLOW-UP VISIT: CPT | Performed by: SURGERY

## 2021-02-25 NOTE — PROGRESS NOTES
[facility]          PATIENT NAME: Christy Watts     TODAY'S DATE: 2/25/2021    SUBJECTIVE:    Pt doing well post op.      OBJECTIVE:  VITALS:  Temp 96.5 °F (35.8 °C)   Wt 192 lb (87.1 kg)   BMI 31.95 kg/m²     CONSTITUTIONAL:  awake and alert    ABDOMEN:  normal bowel sounds, soft, non-distended, non-tender, incisions healed, no S/S of infection     Data:    Radiology Review:  None      ASSESSMENT AND PLAN:  S/P Lap paraesophageal HH repair    Doing well, adv diet  Diet and activity reviewed  Will follow up prn    Phani Terrell

## 2021-03-01 ENCOUNTER — CARE COORDINATION (OUTPATIENT)
Dept: CASE MANAGEMENT | Age: 56
End: 2021-03-01

## 2021-03-05 ENCOUNTER — HOSPITAL ENCOUNTER (OUTPATIENT)
Dept: CT IMAGING | Age: 56
Discharge: HOME | End: 2021-03-05
Payer: COMMERCIAL

## 2021-03-05 ENCOUNTER — TELEPHONE (OUTPATIENT)
Dept: INTERNAL MEDICINE CLINIC | Age: 56
End: 2021-03-05

## 2021-03-05 ENCOUNTER — VIRTUAL VISIT (OUTPATIENT)
Dept: INTERNAL MEDICINE CLINIC | Age: 56
End: 2021-03-05
Payer: COMMERCIAL

## 2021-03-05 ENCOUNTER — HOSPITAL ENCOUNTER (OUTPATIENT)
Age: 56
Discharge: HOME | End: 2021-03-05
Payer: COMMERCIAL

## 2021-03-05 DIAGNOSIS — R05.9 COUGH: ICD-10-CM

## 2021-03-05 DIAGNOSIS — R07.1 INSPIRATORY PAIN: ICD-10-CM

## 2021-03-05 DIAGNOSIS — Z87.19 S/P HERNIA REPAIR: ICD-10-CM

## 2021-03-05 DIAGNOSIS — Z98.890 S/P HERNIA REPAIR: ICD-10-CM

## 2021-03-05 DIAGNOSIS — Z86.16 HISTORY OF COVID-19: ICD-10-CM

## 2021-03-05 DIAGNOSIS — R91.1 PULMONARY NODULE: ICD-10-CM

## 2021-03-05 DIAGNOSIS — R06.02 SOB (SHORTNESS OF BREATH): Primary | ICD-10-CM

## 2021-03-05 DIAGNOSIS — R06.02 SOB (SHORTNESS OF BREATH): ICD-10-CM

## 2021-03-05 DIAGNOSIS — R06.02 SHORTNESS OF BREATH: ICD-10-CM

## 2021-03-05 DIAGNOSIS — R93.89 ABNORMAL CT OF THE CHEST: Primary | ICD-10-CM

## 2021-03-05 PROCEDURE — 99214 OFFICE O/P EST MOD 30 MIN: CPT | Performed by: NURSE PRACTITIONER

## 2021-03-05 PROCEDURE — 6360000004 HC RX CONTRAST MEDICATION: Performed by: INTERNAL MEDICINE

## 2021-03-05 PROCEDURE — 71260 CT THORAX DX C+: CPT

## 2021-03-05 RX ADMIN — IOPAMIDOL 75 ML: 755 INJECTION, SOLUTION INTRAVENOUS at 17:52

## 2021-03-05 NOTE — TELEPHONE ENCOUNTER
Had hiatal hernia repair 3 weeks ago. Since having COVID in October 2020 has had times where she could not take a deep breath. Still is feeling like she cannot get a deep breath. Breathing is about the same since before surgery. Has also developed a dry cough. No fever, no new drainage but has had chronic drainanage since having COVID. Discussed results of CT with patient over the phone. Check PET scan, PFT's. Recommend waiting an additional 3 weeks prior to having done to avoid false positive results following surgery. Will need to see me after tests. Edgardo Morales- can you please call her next week and make sure that she got everything scheduled and get her set up to see me in person please. Thanks!

## 2021-03-05 NOTE — PROGRESS NOTES
3/5/2021    TELEHEALTH EVALUATION -- Audio/Visual (During DYMNN-65 public health emergency)    HPI:    Francisco Enrique (:  1965) has requested an audio/video evaluation for the following concern(s):    VV for 3 day history of dry cough some mild SOB, progressing. Denies fever, chills, myalgias, sinus drainage, sinus pressure, sneezing, change in taste or smell, fatigue. Some burning with deep inspiration. Has had to use her inhaler a few times in the past few days with some relief, she reports this feels like a mild asthma exacerbation but a little different. Had surgery a few weeks ago for [facility] repair. Does not feel she has fully recovered from surgery in regards to lungs. Pt denies a personal or family history of DVT or PE. She is watching her pulse ox at home running 89-95%     Had covid in November - feeling much better but reports she is not sure she fully recovered from this. Review of Systems  Negative other than HPI    Prior to Visit Medications    Medication Sig Taking? Authorizing Provider   sulfaSALAzine (AZULFIDINE) 500 MG tablet Take 1 tablet by mouth 2 times daily Yes Jakob Sahu MD   SUMAtriptan (IMITREX) 100 MG tablet Take 1 tablet by mouth once as needed for Migraine May repeat once after 2 hours if needed. No more than 2 per 24 hour period.  Yes Mynor Parry, DO   DULoxetine (CYMBALTA) 60 MG extended release capsule Take 1 capsule by mouth daily Take with 30 mg  Mynor Parry DO   busPIRone (BUSPAR) 5 MG tablet TAKE 1 TABLET BY MOUTH 3 TIMES A DAY AS NEEDED FOR ANXIETY  Mynor Parry,    DULoxetine (CYMBALTA) 30 MG extended release capsule Take 1 capsule by mouth daily Take with 60 mg Cymbalta  Mynor Parry,    metoprolol tartrate (LOPRESSOR) 50 MG tablet Take 1 tablet by mouth 2 times daily  Mynor Parry, DO   amitriptyline (ELAVIL) 50 MG tablet Take 1 tablet by mouth nightly  Mynor Parry DO   pantoprazole (PROTONIX) 40 MG tablet Take 1 tablet by mouth daily  Tawana Avendaño DO   ondansetron (ZOFRAN ODT) 8 MG TBDP disintegrating tablet Place 1 tablet under the tongue every 8 hours as needed for Nausea, Vomiting or Other (migraines)  Tawana Avendaño DO   Cholecalciferol (VITAMIN D3) 2000 units CAPS Take by mouth  Historical Provider, MD     Social History     Tobacco Use    Smoking status: Never Smoker    Smokeless tobacco: Never Used   Substance Use Topics    Alcohol use: Yes     Comment: occasional     Drug use: Never            PHYSICAL EXAMINATION:  [ INSTRUCTIONS:  \"[x]\" Indicates a positive item  \"[]\" Indicates a negative item  -- DELETE ALL ITEMS NOT EXAMINED]  Vital Signs: (As obtained by patient/caregiver or practitioner observation)    Patient-Reported Vitals 3/5/2021   Patient-Reported Weight 190   Patient-Reported Height -   Patient-Reported Systolic 828   Patient-Reported Diastolic 60   Patient-Reported Pulse 76   Patient-Reported Temperature 97.3   Patient-Reported SpO2 90       Physical Exam  Constitutional:       General: She is not in acute distress. Appearance: Normal appearance. She is not ill-appearing. HENT:      Head: Normocephalic and atraumatic. Pulmonary:      Effort: Pulmonary effort is normal. No respiratory distress. Comments: Appears comfortable and without dyspnea during VV  Neurological:      General: No focal deficit present. Mental Status: She is alert and oriented to person, place, and time. Mental status is at baseline. Psychiatric:         Mood and Affect: Mood normal.         Behavior: Behavior normal.        Other pertinent observable physical exam findings-     Due to this being a TeleHealth encounter, evaluation of the following organ systems is limited: Vitals/Constitutional/EENT/Resp/CV/GI//MS/Neuro/Skin/Heme-Lymph-Imm.     ASSESSMENT/PLAN:  SOB (shortness of breath)/ Inspiratory pain/ Cough/ S/P hernia repair  Stable, uncontrolled  Given recent surgery and some mild pain on inspiration checking stat CT for possible PE  - CT CHEST PULMONARY EMBOLISM W CONTRAST; Future  - Renal Function Panel; Future    If + for PE will start xarelto   If - for PE will start steroids for asthma exacerbation     Reviewed strict criteria to seek emergency medical attention. An  electronic signature was used to authenticate this note. --Pricila Peraza, MELISSA - CNP on 3/5/2021 at 1:00 PM        Pursuant to the emergency declaration under the [address] [place] [address], [facility] waiver authority and the [employer] Act, this Virtual  Visit was conducted, with patient's consent, to reduce the patient's risk of exposure to COVID-19 and provide continuity of care for an established patient. Services were provided through a video synchronous discussion virtually to substitute for in-person clinic visit.

## 2021-03-10 NOTE — TELEPHONE ENCOUNTER
Pt has everything scheduled and apt for Dr [facility] in April.

## 2021-03-24 ENCOUNTER — HOSPITAL ENCOUNTER (OUTPATIENT)
Age: 56
End: 2021-03-24
Payer: COMMERCIAL

## 2021-03-25 ENCOUNTER — TELEPHONE (OUTPATIENT)
Dept: INTERNAL MEDICINE CLINIC | Age: 56
End: 2021-03-25

## 2021-03-25 NOTE — TELEPHONE ENCOUNTER
[facility] for [address] [place] [address] Unit calling---they need you to fax the pt order for her CT of [place] they do not have access to [facility]---please fax to [phone]---pt is leda for Monday--Thanks.

## 2021-03-29 ENCOUNTER — HOSPITAL ENCOUNTER (OUTPATIENT)
Dept: PET IMAGING | Age: 56
Discharge: HOME | End: 2021-03-29
Payer: COMMERCIAL

## 2021-03-29 VITALS — WEIGHT: 190 LBS | BODY MASS INDEX: 31.65 KG/M2 | HEIGHT: 65 IN

## 2021-03-29 DIAGNOSIS — R93.89 ABNORMAL CT OF THE CHEST: ICD-10-CM

## 2021-03-29 DIAGNOSIS — R91.1 PULMONARY NODULE: ICD-10-CM

## 2021-03-29 PROCEDURE — 3430000000 HC RX DIAGNOSTIC RADIOPHARMACEUTICAL: Performed by: INTERNAL MEDICINE

## 2021-03-29 PROCEDURE — A9552 F18 FDG: HCPCS | Performed by: INTERNAL MEDICINE

## 2021-03-29 PROCEDURE — 78815 PET IMAGE W/CT SKULL-THIGH: CPT

## 2021-03-29 RX ORDER — FLUDEOXYGLUCOSE F 18 200 MCI/ML
12.08 INJECTION, SOLUTION INTRAVENOUS
Status: COMPLETED | OUTPATIENT
Start: 2021-03-29 | End: 2021-03-29

## 2021-03-29 RX ADMIN — FLUDEOXYGLUCOSE F 18 12.08 MILLICURIE: 200 INJECTION, SOLUTION INTRAVENOUS at 08:06

## 2021-04-09 ENCOUNTER — OFFICE VISIT (OUTPATIENT)
Dept: INTERNAL MEDICINE CLINIC | Age: 56
End: 2021-04-09
Payer: COMMERCIAL

## 2021-04-09 VITALS
WEIGHT: 196.4 LBS | SYSTOLIC BLOOD PRESSURE: 98 MMHG | OXYGEN SATURATION: 95 % | BODY MASS INDEX: 32.68 KG/M2 | HEART RATE: 80 BPM | DIASTOLIC BLOOD PRESSURE: 70 MMHG

## 2021-04-09 DIAGNOSIS — Q24.8 PERICARDIAL CYST: Primary | ICD-10-CM

## 2021-04-09 DIAGNOSIS — M54.2 NECK PAIN: ICD-10-CM

## 2021-04-09 PROCEDURE — 99214 OFFICE O/P EST MOD 30 MIN: CPT | Performed by: INTERNAL MEDICINE

## 2021-04-09 RX ORDER — METHOCARBAMOL 750 MG/1
750 TABLET, FILM COATED ORAL 3 TIMES DAILY PRN
Qty: 30 TABLET | Refills: 1 | Status: SHIPPED | OUTPATIENT
Start: 2021-04-09 | End: 2021-04-30

## 2021-04-09 NOTE — PROGRESS NOTES
(See Comments)     Bad headache      Past Medical History:   Diagnosis Date    Chronic depression     DDD (degenerative disc disease), lumbar     Fibromyalgia     HTN (hypertension)     KARTHIK (obstructive sleep apnea)     Psoriatic arthritis ([facility] [address]       Past Surgical History:   Procedure Laterality Date    BLADDER SUSPENSION  2004     SECTION  08/31/1987    x1    CHOLECYSTECTOMY  2000    ENDOMETRIAL ABLATION  2007    HIATAL HERNIA REPAIR N/A 2021    LAPAROSCOPIC PARAESOPHAGEAL HIATAL HERNIA REPAIR, NISSEN FUNDOPLICATION performed by Reji Montano MD at [facility] [address], TOTAL ABDOMINAL  2009    heavy menses    NASAL SEPTUM SURGERY  2005    NERVE SURGERY Bilateral 2019    BILATERAL L4-5 AND L5-S1 LUMBAR FACET INJECTIONS WITH FLUOROSCOPY performed by Colby Mcginnis MD at [place] [address]        Social History     Socioeconomic History    Marital status:      Spouse name: Not on file    Number of children: Not on file    Years of education: Not on file    Highest education level: Not on file   Occupational History    Not on file   Social Needs    Financial resource strain: Not on file    Food insecurity     Worry: Not on file     Inability: Not on file    Transportation needs     Medical: Not on file     Non-medical: Not on file   Tobacco Use    Smoking status: Never Smoker    Smokeless tobacco: Never Used   Substance and Sexual Activity    Alcohol use: Yes     Comment: occasional     Drug use: Never    Sexual activity: Not on file   Lifestyle    Physical activity     Days per week: Not on file     Minutes per session: Not on file    Stress: Not on file   Relationships    Social connections     Talks on phone: Not on file     Gets together: Not on file     Attends Yazidi service: Not on file     Active member of club or organization: Not on file     Attends meetings of clubs or organizations: Not on file     Relationship status: Not on file    Intimate partner violence     Fear of current or ex partner: Not on file     Emotionally abused: Not on file     Physically abused: Not on file     Forced sexual activity: Not on file   Other Topics Concern    Not on file   Social History Narrative    Not on file     Family History   Problem Relation Age of Onset    Cancer Mother         gallbladder cancer    High Blood Pressure Mother     Alcohol Abuse Mother     High Blood Pressure Father     Cancer Sister         small cell lung cancer (smoker)    Heart Disease Maternal Grandmother     Heart Disease Maternal Grandfather     Stroke Maternal Grandfather     Heart Disease Paternal Grandmother     Heart Disease Paternal Grandfather     No Known Problems Half-Brother     No Known Problems Half-Sister     Other Half-Sibling         skin cancer    Other Half-Sister         blood clots    Substance Abuse Daughter 32        in remission         Outpatient Medications Prior to Visit   Medication Sig Dispense Refill    DULoxetine (CYMBALTA) 60 MG extended release capsule Take 1 capsule by mouth daily Take with 30 mg 90 capsule 3    busPIRone (BUSPAR) 5 MG tablet TAKE 1 TABLET BY MOUTH 3 TIMES A DAY AS NEEDED FOR ANXIETY 90 tablet 3    DULoxetine (CYMBALTA) 30 MG extended release capsule Take 1 capsule by mouth daily Take with 60 mg Cymbalta 90 capsule 3    metoprolol tartrate (LOPRESSOR) 50 MG tablet Take 1 tablet by mouth 2 times daily 180 tablet 3    amitriptyline (ELAVIL) 50 MG tablet Take 1 tablet by mouth nightly 90 tablet 3    pantoprazole (PROTONIX) 40 MG tablet Take 1 tablet by mouth daily 90 tablet 3    Cholecalciferol (VITAMIN D3) 2000 units CAPS Take by mouth      sulfaSALAzine (AZULFIDINE) 500 MG tablet Take 1 tablet by mouth 2 times daily 180 tablet 0    SUMAtriptan (IMITREX) 100 MG tablet Take 1 tablet by mouth once as needed for Migraine May repeat once after 2 hours if needed.  No more than 2 per 24 hour period. 9 tablet 1    ondansetron (ZOFRAN ODT) 8 MG TBDP disintegrating tablet Place 1 tablet under the tongue every 8 hours as needed for Nausea, Vomiting or Other (migraines) 20 tablet 1     No facility-administered medications prior to visit. Patient'spast medical history, surgical history, family history, medications,  and allergies  were all reviewed and updated as appropriate today. Review of Systems   Constitutional: Negative for appetite change, fatigue and fever. Respiratory: Negative for chest tightness and shortness of breath. Cardiovascular: Negative for chest pain. Gastrointestinal: Negative for constipation and diarrhea. Musculoskeletal: Positive for arthralgias and neck pain. Skin: Negative for rash. Neurological: Negative for weakness and numbness. BP 98/70   Pulse 80   Wt 196 lb 6.4 oz (89.1 kg)   SpO2 95%   BMI 32.68 kg/m²   Physical Exam  Vitals signs and nursing note reviewed. Constitutional:       Appearance: She is well-developed. She is not toxic-appearing. HENT:      Head: Normocephalic. Right Ear: Tympanic membrane, ear canal and external ear normal.      Left Ear: Tympanic membrane, ear canal and external ear normal.   Eyes:      General: No scleral icterus. Extraocular Movements: Extraocular movements intact. Conjunctiva/sclera: Conjunctivae normal.      Pupils: Pupils are equal, round, and reactive to light. Neck:      Thyroid: No thyroid mass or thyromegaly. Vascular: No carotid bruit. Cardiovascular:      Rate and Rhythm: Normal rate and regular rhythm. Pulses:           Dorsalis pedis pulses are 2+ on the right side and 2+ on the left side. Heart sounds: Normal heart sounds. No murmur. Comments: No LE edema  Pulmonary:      Effort: Pulmonary effort is normal.      Breath sounds: Normal breath sounds. Musculoskeletal:      Cervical back: She exhibits tenderness.  She exhibits normal range of motion, no swelling, no edema and no spasm (increased muslce tension). Lymphadenopathy:      Cervical: No cervical adenopathy. Neurological:      General: No focal deficit present. Mental Status: She is alert and oriented to person, place, and time. Psychiatric:         Mood and Affect: Mood normal.         Behavior: Behavior normal. Behavior is cooperative. ASSESSMENT/PLAN:    Problem List Items Addressed This Visit     Neck pain      Suspect shoulder pain is referred from her neck. Add Robaxin. Consider referral to physical therapy. Relevant Medications    methocarbamol (ROBAXIN-750) 750 MG tablet    Pericardial cyst - Primary      PET scan on 3/29/2021 was negative for malignancy. Check echocardiogram as spot is concerning for pericardial cyst.  Referral placed to CT surgery for further evaluation.          Relevant Orders    ECHO Complete 2D W Doppler W Color    Leonor Verde MD, Cardiothoracic Surgery, [facility]          Current Outpatient Medications   Medication Sig Dispense Refill    methocarbamol (ROBAXIN-750) 750 MG tablet Take 1 tablet by mouth 3 times daily as needed (pain and spasm) 30 tablet 1    DULoxetine (CYMBALTA) 60 MG extended release capsule Take 1 capsule by mouth daily Take with 30 mg 90 capsule 3    busPIRone (BUSPAR) 5 MG tablet TAKE 1 TABLET BY MOUTH 3 TIMES A DAY AS NEEDED FOR ANXIETY 90 tablet 3    DULoxetine (CYMBALTA) 30 MG extended release capsule Take 1 capsule by mouth daily Take with 60 mg Cymbalta 90 capsule 3    metoprolol tartrate (LOPRESSOR) 50 MG tablet Take 1 tablet by mouth 2 times daily 180 tablet 3    pantoprazole (PROTONIX) 40 MG tablet Take 1 tablet by mouth daily 90 tablet 3    Cholecalciferol (VITAMIN D3) 2000 units CAPS Take by mouth      amitriptyline (ELAVIL) 50 MG tablet TAKE 1 TABLET BY MOUTH NIGHTLY 90 tablet 1    sulfaSALAzine (AZULFIDINE) 500 MG tablet Take 1 tablet by mouth 2 times daily 180 tablet 0    SUMAtriptan

## 2021-04-18 PROBLEM — Q24.8 PERICARDIAL CYST: Status: ACTIVE | Noted: 2021-04-18

## 2021-04-18 ASSESSMENT — ENCOUNTER SYMPTOMS
DIARRHEA: 0
CONSTIPATION: 0
CHEST TIGHTNESS: 0
SHORTNESS OF BREATH: 0

## 2021-04-18 NOTE — ASSESSMENT & PLAN NOTE
PET scan on 3/29/2021 was negative for malignancy. Check echocardiogram as spot is concerning for pericardial cyst.  Referral placed to CT surgery for further evaluation.

## 2021-04-29 NOTE — PROGRESS NOTES
Consultation H&P    Date of Consultation:  2021    PCP:  Ruddy Ruby DO          Chief Complaint: pericardial cyst    History of Present Illness: We are asked to see this patient in consultation by Dr. Jameel Coppola regarding pericardial cyst.  Lorie Vanegas is a 64 y.o. female who underwent hiatal hernia repair 2021. Developed cough. CT chest 3/5/21 showed no PE, however incidentally noted solid 2.1cm retrosternal nodule. No associated symptoms. Past Medical History:  Past Medical History:   Diagnosis Date    Chronic depression     DDD (degenerative disc disease), lumbar     Fatty liver     MRI 3/2021 mild    Fibromyalgia     Hiatal hernia     HTN (hypertension)     Obesity (BMI 30.0-34. 9)     BMI 32.68     KARTHIK (obstructive sleep apnea)     has mask, doesn't wear it    Pneumonia     in her 35s had pna B 7 yrs in a row    Psoriatic arthritis ([facility] [address]        Past Surgical History:  Past Surgical History:   Procedure Laterality Date    BLADDER SUSPENSION  2004     SECTION  08/31/1987    x1    CHOLECYSTECTOMY      ENDOMETRIAL ABLATION      HIATAL HERNIA REPAIR N/A 2021    LAPAROSCOPIC PARAESOPHAGEAL HIATAL HERNIA REPAIR, NISSEN FUNDOPLICATION performed by Valencia Mera MD at [facility] beds at the [facility] [address], [address]  2009    heavy menses    NASAL SEPTUM SURGERY  2005    NERVE SURGERY Bilateral 2019    BILATERAL L4-5 AND L5-S1 LUMBAR FACET INJECTIONS WITH FLUOROSCOPY performed by Bettye Bernstein MD at [place] [address]         Home Medications:   Prior to Admission medications    Medication Sig Start Date End Date Taking?  Authorizing Provider   amitriptyline (ELAVIL) 50 MG tablet TAKE 1 TABLET BY MOUTH NIGHTLY 21  Yes Lizeth De Anda DO   DULoxetine (CYMBALTA) 60 MG extended release capsule Take 1 capsule by mouth daily Take with 30 mg  Patient taking differently: Take 60 mg by mouth nightly  2/10/21 Yes Angela Rogers DO   busPIRone (BUSPAR) 5 MG tablet TAKE 1 TABLET BY MOUTH 3 TIMES A DAY AS NEEDED FOR ANXIETY 1/27/21  Yes Angela Rogers DO   metoprolol tartrate (LOPRESSOR) 50 MG tablet Take 1 tablet by mouth 2 times daily 1/27/21  Yes Angela Rogers DO   pantoprazole (PROTONIX) 40 MG tablet Take 1 tablet by mouth daily 8/14/20  Yes Angela Rogers DO   Cholecalciferol (VITAMIN D3) 2000 units CAPS Take by mouth   Yes Historical Provider, MD   DULoxetine (CYMBALTA) 30 MG extended release capsule Take 1 capsule by mouth daily Take with 60 mg Cymbalta  Patient taking differently: Take 30 mg by mouth daily In the morning 1/27/21 4/27/21  Angela Rogers DO   sulfaSALAzine (AZULFIDINE) 500 MG tablet Take 1 tablet by mouth 2 times daily 12/31/20 3/5/21  Ingris Powers MD   SUMAtriptan (IMITREX) 100 MG tablet Take 1 tablet by mouth once as needed for Migraine May repeat once after 2 hours if needed. No more than 2 per 24 hour period. 3/9/20 3/5/21  Angela Rogers DO        Allergies:     Allergies   Allergen Reactions    Morphine Other (See Comments)     Chest tightness    Norco [Hydrocodone-Acetaminophen] Other (See Comments)     Bad headache        Social History:    Working:  for dental practice  Caffeine: 1 cup coffee 4 days a week  Lifestyle: son lives with her  Social History     Socioeconomic History    Marital status:      Spouse name: Not on file    Number of children: Not on file    Years of education: Not on file    Highest education level: Not on file   Occupational History    Not on file   Social Needs    Financial resource strain: Not on file    Food insecurity     Worry: Not on file     Inability: Not on file   [employer] needs     Medical: Not on file     Non-medical: Not on file   Tobacco Use    Smoking status: Never Smoker    Smokeless tobacco: Never Used   Substance and Sexual Activity    Alcohol use: Yes     Comment: every month or so   Mika Bolton Drug use: Yes     Types: Marijuana     Comment: last 2019    Sexual activity: Not on file   Lifestyle    Physical activity     Days per week: Not on file     Minutes per session: Not on file    Stress: Not on file   Relationships    Social connections     Talks on phone: Not on file     Gets together: Not on file     Attends Mandaeism service: Not on file     Active member of club or organization: Not on file     Attends meetings of clubs or organizations: Not on file     Relationship status: Not on file    Intimate partner violence     Fear of current or ex partner: Not on file     Emotionally abused: Not on file     Physically abused: Not on file     Forced sexual activity: Not on file   Other Topics Concern    Not on file   Social History Narrative    Not on file       Family History:      Problem Relation Age of Onset    Cancer Mother         gallbladder cancer    High Blood Pressure Mother     Alcohol Abuse Mother     High Blood Pressure Father     Cancer Sister         small cell lung cancer (smoker)    Heart Disease Maternal Grandmother     Heart Disease Maternal Grandfather     Stroke Maternal Grandfather     Heart Disease Paternal Grandmother     Heart Disease Paternal Grandfather     No Known Problems Half-Brother     No Known Problems Half-Sister     Other Half-Sibling         skin cancer    Other Half-Sister         blood clots    Substance Abuse Daughter 32        in remission        Review of Systems:  Reviewed, negative unless noted  Constitutional: weight change, weakness, impairment of ADLs  Eyes: eyestrain, redness, discharges  ENMT: post nasal drip, sinus pain, discharge   Cardiovascular: faintness, vertigo, color changes in fingers/toes  Respiratory: cough, sputum, hemoptysis  GI: excessive thirst, changes in bowel habits, abdominal swelling  : painful urination, pyuria, incontinence  Musculoskeletal: cramps, swelling, limitation of motor activity  Integumentary: cyanosis, changes in skin, dryness  Neurological: paralysis, tingling, tremors  Psychiatric: restlessness, irritability, mood swings  Endocrine: heat/cold intolerance, excessive sweating, hair loss  Hematologic/lymphatic: swollen glands, anemia, easy bruising/bleeding      Physical Examination:    /86 (Site: Right Upper Arm, Position: Sitting)   Pulse 95   Temp 97.1 °F (36.2 °C) (Infrared)   Ht 5' 5\" (1.651 m)   Wt 194 lb 6.4 oz (88.2 kg)   SpO2 97%   BMI 32.35 kg/m²      BP RUE: as above BP LUE: 124/82  Weight: 194 lb 6.4 oz (88.2 kg)   Hand dominance: right  General appearance: NAD, well nourished, overweight  Eyes: anicteric, PERRLA  ENMT: no scars or lesions, no nasal deformity, normal dentition, no cyanosis of oral mucosa  Neck: no masses, no thyroid enlargement, no JVD. Respiratory: effort is unlabored, symmetric, no crackles, wheezes or rubs. No palpable/percussable abnormalities. Cardiovascular: regular, no murmur. PMI normal, no thrill. No carotid bruits. No edema or varicosities. Abdominal aorta cannot be appreciated given body habitus. Pulses:    carotid brachial radial femoral popliteal DP PT   RIGHT   2+       LEFT   2+       GI: abdomen soft, nondistended, well healed surgical incisions  Lymphatic: no cervical/supraclavicular adenopathy  Musculoskeletal: strength and tone normal. Full ROM. No scoliosis. Extremities: warm and pink. No clubbing or petechiae. Skin: no dermatitis or ulceration. No nodularity or induration. Neuro: CN grossly intact. Sensation and motor function grossly intact. Psychiatric: oriented, appropriate mood/affect. MEDICAL DECISION MAKING/TESTING  Studies personally reviewed. Echo: 4/30/21  -Left ventricular systolic function is normal with ejection fraction   estimated at 55-60 %.   -No regional wall motion abnormalities are noted. -There is mild concentric left ventricular hypertrophy. -Grade I diastolic dysfunction with normal LV filling pressures.    -Aortic valve appears very mildly sclerotic but opens adequately. -Mild tricuspid regurgitation.   -Systolic pulmonary artery pressure (SPAP) is normal and estimated at 23   mmHg (right atrial pressure 3 mmHg). -IVC is normal in size (< 2.1 cm) and collapses > 50% with respiration   consistent with normal RA pressure (3 mmHg). CT chest:   3/5/21  Mediastinum: Thyroid gland appears normal.  Trace pericardial fluid is seen. No intimal flap noted.  There is mild wall thickening at the GE junction. Trace fluid is seen at the GE junction.  Postsurgical change from Nissen   fundoplication is seen       No embolus is seen in the central, right, or left main pulmonary artery.  No   embolus is seen in the segmental branches.  Subsegmental branches are not   well evaluated secondary to motion       No intimal flap seen in aorta       There is a solid nodule seen in the retrosternal region, measuring 2.1 cm x   1.2 cm.       Lungs/pleura: No pneumothorax noted.  Bandlike opacity seen in the right   upper lobe.  Bandlike opacity seen in the right lower lobe.  Trace pleural   fluid is seen on the right.       Bandlike opacity seen in the left lower lobe.  Trace pleural fluid seen on   the left.       Upper Abdomen: Postsurgical changes are seen at the GE junction.       Soft Tissues/Bones: No acute bone or soft tissue abnormality. PET/CT: 3/29/21  HEAD/NECK: Normal physiologic activity within the brain.  No cervical   lymphadenopathy.       CHEST: 2.3 x 1.7 cm oval-shaped nodule in the anterior mediastinum that is   stable from recent CT.  Attenuation measures 58 HU, similar to attenuation on   the prior contrast enhanced study showing that there is no enhancement of   this lesion.  There is no hypermetabolism.  No additional findings of   mediastinal lymphadenopathy.  Band of atelectasis in the left lower lobe.    Mild cardiomegaly.       ABDOMEN/PELVIS: There is normal physiologic excretion by the kidneys into the bladder.       BONES/SOFT TISSUE: No abnormal hypermetabolism in the axial or appendicular   skeleton.       INCIDENTAL CT FINDINGS: No other significant findings.  Prior surgical   changes are noted at the GE junction.  The patient has undergone   cholecystectomy.  Surgical change of hysterectomy also noted. CT abd:    12/24/09.  Moderate free pelvic fluid with hematocrit level consistent  with blood.  This may be post-traumatic or due to ovarian  cyst/follicle rupture. 2.  Otherwise unremarkable non-contrast CT.    2/15/10 No renal or ureteral stones are present. There is no  hydronephrosis. Kidneys are unremarkable in appearance. There  are clips from previous cholecystectomy. Noncontrast enhanced  images of the liver, spleen and pancreas are normal. Views of  the pelvis show normal appearance of the appendix. There is a  small amount of free fluid in the pelvis. Ovaries are within  normal limits in size. 7/30/11 Hiatal hernia. 4 cm left ovarian cyst. Previous cholecystectomy. Normal appendix. No acute inflammatory process. No ureteral stones or obstruction. 10/29/12  Normal noncontrast CT abdomen and pelvis. 2/28/18 1. No evidence of acute abdominal or pelvic pathologic process. 2. Moderate sliding-type hiatal hernia.     7/24/19  Large stool load with scattered diverticula.  No significant pericolonic   stranding       Moderate size hiatal hernia       Labs:   CBC:   Lab Results   Component Value Date    WBC 13.1 02/13/2021    HGB 12.8 02/13/2021    HCT 38.9 02/13/2021    MCV 94.9 02/13/2021     02/13/2021     BMP:   Lab Results   Component Value Date     02/12/2021    K 4.2 02/12/2021     02/12/2021    CO2 24 02/12/2021    BUN 12 02/12/2021    CREATININE 0.8 02/12/2021    CALCIUM 9.2 02/12/2021     Cardiac Enzymes: No results found for: CKTOTAL, CKMB, CKMBINDEX, TROPONINI  PT/INR: No results found for: PROTIME, INR  APTT: No results found for: APTT  Liver Profile:  Lab Results   Component Value Date    AST 19 12/11/2020    ALT 17 12/11/2020    BILIDIR <0.2 12/11/2020    BILITOT <0.2 12/11/2020    ALKPHOS 71 12/11/2020    LABALBU 3.9 12/11/2020     Lab Results   Component Value Date    CHOL 194 09/25/2020    HDL 46 09/25/2020    TRIG 61 09/25/2020     TSH:  No results found for: TSH  HgbA1c:  Lab Results   Component Value Date    LABA1C 6.0 09/25/2020     UA:   Lab Results   Component Value Date    COLORU DK YELLOW 08/13/2019    PHUR 7.0 08/13/2019    WBCUA None seen 08/13/2019    RBCUA 0-2 08/13/2019    CLARITYU Clear 08/13/2019    SPECGRAV 1.022 08/13/2019    LEUKOCYTESUR Negative 08/13/2019    UROBILINOGEN 2.0 08/13/2019    BILIRUBINUR Negative 08/13/2019    BLOODU Negative 08/13/2019    GLUCOSEU Negative 08/13/2019    AMORPHOUS 2+ 08/13/2019       History obtained: chart, pt    Risk factors:     Diagnosis: anterior mediastinal mass     Plan:   - anterior mediastinal mass  In retrosternal fat pad. Solid. Most likely thymic, nodule/involution/thymoma, though asymptomatic. Could be lymphatic or adipose in origin. No prior CT chest and previous abd imaging doesn't reach quite high enough for comparison so cannot assess chronicity. No spiculation or hypermetabolism on PET. Discussed options of reimaging v. resection for definitive diagnosis. R VATS approach/possible thoracotomy/sternotomy reviewed along with typical periop/postop course. Risks, benefits and postoperative complications discussed including bleeding, infection, postop pulmonary and cardiac issues. Pt very comfortable with reimaging 6 months. If at any point this grows, causes symptoms or concern, can proceed with resection.       Kayla Matias MD  4/29/2021 11:14 AM -11:30 AM chart review   Kayla Matias MD  4/30/2021  2:00 PM

## 2021-04-30 ENCOUNTER — OFFICE VISIT (OUTPATIENT)
Dept: CARDIOTHORACIC SURGERY | Age: 56
End: 2021-04-30
Payer: COMMERCIAL

## 2021-04-30 ENCOUNTER — HOSPITAL ENCOUNTER (OUTPATIENT)
Dept: NON INVASIVE DIAGNOSTICS | Age: 56
Discharge: HOME | End: 2021-04-30
Payer: COMMERCIAL

## 2021-04-30 VITALS
OXYGEN SATURATION: 97 % | SYSTOLIC BLOOD PRESSURE: 122 MMHG | TEMPERATURE: 97.1 F | HEART RATE: 95 BPM | HEIGHT: 65 IN | DIASTOLIC BLOOD PRESSURE: 86 MMHG | BODY MASS INDEX: 32.39 KG/M2 | WEIGHT: 194.4 LBS

## 2021-04-30 DIAGNOSIS — Q24.8 PERICARDIAL CYST: ICD-10-CM

## 2021-04-30 DIAGNOSIS — J98.59 MEDIASTINAL MASS: Primary | ICD-10-CM

## 2021-04-30 LAB
LV EF: 58 %
LVEF MODALITY: NORMAL

## 2021-04-30 PROCEDURE — 99204 OFFICE O/P NEW MOD 45 MIN: CPT | Performed by: THORACIC SURGERY (CARDIOTHORACIC VASCULAR SURGERY)

## 2021-04-30 PROCEDURE — 93306 TTE W/DOPPLER COMPLETE: CPT

## 2021-04-30 RX ORDER — AMITRIPTYLINE HYDROCHLORIDE 10 MG/1
TABLET, FILM COATED ORAL
Qty: 90 TABLET | Refills: 3 | Status: SHIPPED | OUTPATIENT
Start: 2021-04-30 | End: 2021-05-25 | Stop reason: DRUGHIGH

## 2021-05-05 DIAGNOSIS — Z79.899 HIGH RISK MEDICATION USE: Primary | ICD-10-CM

## 2021-05-05 DIAGNOSIS — L40.50 PSORIATIC ARTHRITIS (HCC): ICD-10-CM

## 2021-05-05 DIAGNOSIS — L40.9 PSORIASIS: ICD-10-CM

## 2021-05-05 RX ORDER — SULFASALAZINE 500 MG/1
TABLET ORAL
Qty: 180 TABLET | Refills: 0 | OUTPATIENT
Start: 2021-05-05

## 2021-05-07 DIAGNOSIS — L40.50 PSORIATIC ARTHRITIS (HCC): ICD-10-CM

## 2021-05-07 DIAGNOSIS — Z79.899 HIGH RISK MEDICATION USE: ICD-10-CM

## 2021-05-07 DIAGNOSIS — L40.9 PSORIASIS: ICD-10-CM

## 2021-05-07 LAB
ALT SERPL-CCNC: 18 U/L (ref 10–40)
AST SERPL-CCNC: 19 U/L (ref 15–37)
BASOPHILS ABSOLUTE: 0 K/UL (ref 0–0.2)
BASOPHILS RELATIVE PERCENT: 0.5 %
CREAT SERPL-MCNC: 0.7 MG/DL (ref 0.6–1.1)
EOSINOPHILS ABSOLUTE: 0.1 K/UL (ref 0–0.6)
EOSINOPHILS RELATIVE PERCENT: 1.6 %
GFR AFRICAN AMERICAN: >60
GFR NON-AFRICAN AMERICAN: >60
HCT VFR BLD CALC: 39.5 % (ref 36–48)
HEMOGLOBIN: 13.3 G/DL (ref 12–16)
LYMPHOCYTES ABSOLUTE: 1.4 K/UL (ref 1–5.1)
LYMPHOCYTES RELATIVE PERCENT: 36 %
MCH RBC QN AUTO: 31.5 PG (ref 26–34)
MCHC RBC AUTO-ENTMCNC: 33.7 G/DL (ref 31–36)
MCV RBC AUTO: 93.7 FL (ref 80–100)
MONOCYTES ABSOLUTE: 0.3 K/UL (ref 0–1.3)
MONOCYTES RELATIVE PERCENT: 8 %
NEUTROPHILS ABSOLUTE: 2.1 K/UL (ref 1.7–7.7)
NEUTROPHILS RELATIVE PERCENT: 53.9 %
PDW BLD-RTO: 15.2 % (ref 12.4–15.4)
PLATELET # BLD: 327 K/UL (ref 135–450)
PMV BLD AUTO: 8.3 FL (ref 5–10.5)
RBC # BLD: 4.22 M/UL (ref 4–5.2)
WBC # BLD: 3.9 K/UL (ref 4–11)

## 2021-05-10 RX ORDER — SULFASALAZINE 500 MG/1
500 TABLET ORAL 2 TIMES DAILY
Qty: 180 TABLET | Refills: 0 | Status: SHIPPED | OUTPATIENT
Start: 2021-05-10 | End: 2021-07-01 | Stop reason: SDUPTHER

## 2021-05-25 DIAGNOSIS — G43.109 MIGRAINE WITH AURA AND WITHOUT STATUS MIGRAINOSUS, NOT INTRACTABLE: ICD-10-CM

## 2021-05-25 RX ORDER — AMITRIPTYLINE HYDROCHLORIDE 50 MG/1
50 TABLET, FILM COATED ORAL NIGHTLY
Qty: 90 TABLET | Refills: 1 | Status: SHIPPED | OUTPATIENT
Start: 2021-05-25 | End: 2021-09-17 | Stop reason: SDUPTHER

## 2021-06-11 ENCOUNTER — HOSPITAL ENCOUNTER (OUTPATIENT)
Age: 56
Discharge: HOME | End: 2021-06-11
Payer: COMMERCIAL

## 2021-06-11 ENCOUNTER — OFFICE VISIT (OUTPATIENT)
Dept: INTERNAL MEDICINE CLINIC | Age: 56
End: 2021-06-11
Payer: COMMERCIAL

## 2021-06-11 VITALS
WEIGHT: 195.4 LBS | OXYGEN SATURATION: 98 % | HEART RATE: 64 BPM | DIASTOLIC BLOOD PRESSURE: 92 MMHG | BODY MASS INDEX: 32.52 KG/M2 | SYSTOLIC BLOOD PRESSURE: 130 MMHG

## 2021-06-11 DIAGNOSIS — I10 ESSENTIAL HYPERTENSION: Primary | ICD-10-CM

## 2021-06-11 DIAGNOSIS — I51.7 LVH (LEFT VENTRICULAR HYPERTROPHY): ICD-10-CM

## 2021-06-11 DIAGNOSIS — M25.531 PAIN AND SWELLING OF RIGHT WRIST: ICD-10-CM

## 2021-06-11 DIAGNOSIS — Q24.8 PERICARDIAL CYST: ICD-10-CM

## 2021-06-11 DIAGNOSIS — M25.431 PAIN AND SWELLING OF RIGHT WRIST: ICD-10-CM

## 2021-06-11 PROCEDURE — 99214 OFFICE O/P EST MOD 30 MIN: CPT | Performed by: INTERNAL MEDICINE

## 2021-06-11 RX ORDER — LISINOPRIL 10 MG/1
10 TABLET ORAL DAILY
Qty: 90 TABLET | Refills: 1 | Status: SHIPPED | OUTPATIENT
Start: 2021-06-11 | End: 2021-07-18 | Stop reason: SINTOL

## 2021-06-11 NOTE — PROGRESS NOTES
Patient: Satinder Daniels is a 64 y.o. female who presents today with the following Chief Complaint(s):  No chief complaint on file. HPI     Here today for follow up. Did fall about 2 weeks ago when she tripped over her dog's leash. Hurt her hand. Went urgent care and had x-rays done of right hand and ribs, no fracture. Is still having some swelling in her hand/wrist and cannot grasp. Did see CT surgery regarding pericardial cyst. Monitor with serial CT's. Echo 4/30/2021:  Conclusions      Summary   -Left ventricular systolic function is normal with ejection fraction   estimated at 55-60 %.   -No regional wall motion abnormalities are noted. -There is mild concentric left ventricular hypertrophy. -Grade I diastolic dysfunction with normal LV filling pressures. -Aortic valve appears very mildly sclerotic but opens adequately. -Mild tricuspid regurgitation.   -Systolic pulmonary artery pressure (SPAP) is normal and estimated at 23   mmHg (right atrial pressure 3 mmHg). -IVC is normal in size (< 2.1 cm) and collapses > 50% with respiration   consistent with normal RA pressure (3 mmHg). Signature      ------------------------------------------------------------------   Electronically signed by Shraddha Coates MD, [facility] (Interpreting   physician) on 04/30/2021 at 11:11 AM   ------------------------------------------------------------------      Allergies   Allergen Reactions    Morphine Other (See Comments)     Chest tightness    Norco [Hydrocodone-Acetaminophen] Other (See Comments)     Bad headache      Past Medical History:   Diagnosis Date    Chronic depression     DDD (degenerative disc disease), lumbar     Fatty liver     MRI 3/2021 mild    Fibromyalgia     Hiatal hernia 2011    HTN (hypertension)     Obesity (BMI 30.0-34. 9)     BMI 32.68 2021    KARTHIK (obstructive sleep apnea)     has mask, doesn't wear it    Pneumonia     in her 35s had pna B 7 yrs in a row    Psoriatic arthritis [facility])       Past Surgical History:   Procedure Laterality Date    BLADDER SUSPENSION  2004     SECTION  08/31/1987    x1    CHOLECYSTECTOMY  2000    ENDOMETRIAL ABLATION  2007    HIATAL HERNIA REPAIR N/A 2021    LAPAROSCOPIC PARAESOPHAGEAL HIATAL HERNIA REPAIR, NISSEN FUNDOPLICATION performed by Mary Kate Ortiz MD at [address] [facility], TOTAL ABDOMINAL  2009    heavy menses    NASAL SEPTUM SURGERY  2005    NERVE SURGERY Bilateral 2019    BILATERAL L4-5 AND L5-S1 LUMBAR FACET INJECTIONS WITH FLUOROSCOPY performed by Steven Steele MD at [place] [address]        Social History     Socioeconomic History    Marital status:      Spouse name: Not on file    Number of children: Not on file    Years of education: Not on file    Highest education level: Not on file   Occupational History    Not on file   Tobacco Use    Smoking status: Never Smoker    Smokeless tobacco: Never Used   Vaping Use    Vaping Use: Never used   Substance and Sexual Activity    Alcohol use: Yes     Comment: every month or so    Drug use: Yes     Types: Marijuana     Comment: last     Sexual activity: Not on file   Other Topics Concern    Not on file   Social History Narrative    Not on file     Social Determinants of Health     Financial Resource Strain:     Difficulty of Paying Living Expenses:    Food Insecurity:     Worried About [facility] in the Last Year:     [facility] in the Last Year:    Transportation Needs:     Lack of Transportation (Medical):      Lack of Transportation (Non-Medical):    Physical Activity:     Days of Exercise per Week:     Minutes of Exercise per Session:    Stress:     Feeling of Stress :    Social Connections:     Frequency of Communication with Friends and Family:     Frequency of Social Gatherings with Friends and Family:     Attends L[employer] day Services:     Active Member of Clubs or Organizations:  Attends Club or Organization Meetings:     Marital Status:    Intimate Partner Violence:     Fear of Current or Ex-Partner:     Emotionally Abused:     Physically Abused:     Sexually Abused:      Family History   Problem Relation Age of Onset    Cancer Mother         gallbladder cancer    High Blood Pressure Mother     Alcohol Abuse Mother     High Blood Pressure Father     Cancer Sister         small cell lung cancer (smoker)    Heart Disease Maternal Grandmother     Heart Disease Maternal Grandfather     Stroke Maternal Grandfather     Heart Disease Paternal Grandmother     Heart Disease Paternal Grandfather     No Known Problems Half-Brother     No Known Problems Half-Sister     Other Half-Sibling         skin cancer    Other Half-Sister         blood clots    Substance Abuse Daughter 32        in remission         Outpatient Medications Prior to Visit   Medication Sig Dispense Refill    amitriptyline (ELAVIL) 50 MG tablet Take 1 tablet by mouth nightly 90 tablet 1    DULoxetine (CYMBALTA) 60 MG extended release capsule Take 1 capsule by mouth daily Take with 30 mg (Patient taking differently: Take 60 mg by mouth nightly ) 90 capsule 3    busPIRone (BUSPAR) 5 MG tablet TAKE 1 TABLET BY MOUTH 3 TIMES A DAY AS NEEDED FOR ANXIETY 90 tablet 3    metoprolol tartrate (LOPRESSOR) 50 MG tablet Take 1 tablet by mouth 2 times daily 180 tablet 3    pantoprazole (PROTONIX) 40 MG tablet Take 1 tablet by mouth daily 90 tablet 3    Cholecalciferol (VITAMIN D3) 2000 units CAPS Take by mouth      sulfaSALAzine (AZULFIDINE) 500 MG tablet Take 1 tablet by mouth 2 times daily 180 tablet 0    DULoxetine (CYMBALTA) 30 MG extended release capsule Take 1 capsule by mouth daily Take with 60 mg Cymbalta (Patient taking differently: Take 30 mg by mouth daily In the morning) 90 capsule 3    SUMAtriptan (IMITREX) 100 MG tablet Take 1 tablet by mouth once as needed for Migraine May repeat once after 2 hours if needed. No more than 2 per 24 hour period. 9 tablet 1     No facility-administered medications prior to visit. Patient'spast medical history, surgical history, family history, medications,  and allergies  were all reviewed and updated as appropriate today. Review of Systems   Constitutional: Negative for appetite change, fatigue and fever. Respiratory: Negative for chest tightness and shortness of breath. Cardiovascular: Negative for chest pain. Gastrointestinal: Negative for constipation and diarrhea. Skin: Negative for rash. BP (!) 130/92   Pulse 64   Wt 195 lb 6.4 oz (88.6 kg)   SpO2 98%   BMI 32.52 kg/m²   Physical Exam  Vitals and nursing note reviewed. Constitutional:       Appearance: She is well-developed. She is not toxic-appearing. HENT:      Head: Normocephalic. Right Ear: Tympanic membrane, ear canal and external ear normal.      Left Ear: Tympanic membrane, ear canal and external ear normal.   Eyes:      General: No scleral icterus. Extraocular Movements: Extraocular movements intact. Conjunctiva/sclera: Conjunctivae normal.      Pupils: Pupils are equal, round, and reactive to light. Neck:      Thyroid: No thyroid mass or thyromegaly. Vascular: No carotid bruit. Cardiovascular:      Rate and Rhythm: Normal rate and regular rhythm. Pulses:           Dorsalis pedis pulses are 2+ on the right side and 2+ on the left side. Heart sounds: Normal heart sounds. No murmur heard. Comments: No LE edema  Pulmonary:      Effort: Pulmonary effort is normal.      Breath sounds: Normal breath sounds. Musculoskeletal:      Right hand: Swelling, tenderness and bony tenderness present. No deformity. Normal range of motion. Left hand: Normal.      Comments: Pain over right hand near thumb (anatomic snuff box). Lymphadenopathy:      Cervical: No cervical adenopathy. Neurological:      General: No focal deficit present.       Mental Status: She is alert and oriented to person, place, and time. Psychiatric:         Mood and Affect: Mood normal.         Behavior: Behavior normal. Behavior is cooperative. ASSESSMENT/PLAN:    Problem List Items Addressed This Visit     Essential hypertension - Primary     Start lisinopril 10 mg daily. Check BMP in 1 week. Echocardiogram with grade 1 diastolic dysfunction and mild concentric LVH. Lisinopril should help with this. Monitor echo. Consider referral to cardiology as well. Relevant Medications    lisinopril (PRINIVIL;ZESTRIL) 10 MG tablet    Other Relevant Orders    BASIC METABOLIC PANEL (Completed)    Basic Metabolic Panel (Completed)    LVH (left ventricular hypertrophy)      Start lisinopril 10 mg daily. Check BMP in 1 week. Echocardiogram with grade 1 diastolic dysfunction and mild concentric LVH. Lisinopril should help with this. Monitor echo. Consider referral to cardiology as well. Relevant Medications    lisinopril (PRINIVIL;ZESTRIL) 10 MG tablet    Other Relevant Orders    BASIC METABOLIC PANEL (Completed)    Basic Metabolic Panel (Completed)    Pain and swelling of right wrist     Following fall over dog leash. Repeat x-rays of hand and wrist. Referral placed to orthopedics. Relevant Orders    XR HAND RIGHT (MIN 3 VIEWS)    XR WRIST RIGHT (MIN 3 VIEWS)    Fab Fleming MD, Orthopedic Surgery, [facility]    Pericardial cyst      Patient did see Dr. Lakesha Pierson for CT surgery. She is going to be followed with serial CT scans- will be repeated in 6 months. No need for surgery at this time.                Current Outpatient Medications   Medication Sig Dispense Refill    lisinopril (PRINIVIL;ZESTRIL) 10 MG tablet Take 1 tablet by mouth daily 90 tablet 1    amitriptyline (ELAVIL) 50 MG tablet Take 1 tablet by mouth nightly 90 tablet 1    DULoxetine (CYMBALTA) 60 MG extended release capsule Take 1 capsule by mouth daily Take with 30 mg (Patient taking differently: Take 60 mg by mouth nightly ) 90 capsule 3    busPIRone (BUSPAR) 5 MG tablet TAKE 1 TABLET BY MOUTH 3 TIMES A DAY AS NEEDED FOR ANXIETY 90 tablet 3    metoprolol tartrate (LOPRESSOR) 50 MG tablet Take 1 tablet by mouth 2 times daily 180 tablet 3    pantoprazole (PROTONIX) 40 MG tablet Take 1 tablet by mouth daily 90 tablet 3    Cholecalciferol (VITAMIN D3) 2000 units CAPS Take by mouth      sulfaSALAzine (AZULFIDINE) 500 MG tablet Take 1 tablet by mouth 2 times daily 180 tablet 0    DULoxetine (CYMBALTA) 30 MG extended release capsule Take 1 capsule by mouth daily Take with 60 mg Cymbalta (Patient taking differently: Take 30 mg by mouth daily In the morning) 90 capsule 3    SUMAtriptan (IMITREX) 100 MG tablet Take 1 tablet by mouth once as needed for Migraine May repeat once after 2 hours if needed. No more than 2 per 24 hour period. 9 tablet 1     No current facility-administered medications for this visit. Return in about 6 weeks (around 7/23/2021) for htn.

## 2021-06-20 PROBLEM — M25.531 PAIN AND SWELLING OF RIGHT WRIST: Status: ACTIVE | Noted: 2021-06-20

## 2021-06-20 PROBLEM — M25.431 PAIN AND SWELLING OF RIGHT WRIST: Status: ACTIVE | Noted: 2021-06-20

## 2021-06-20 PROBLEM — I51.7 LVH (LEFT VENTRICULAR HYPERTROPHY): Status: ACTIVE | Noted: 2021-06-20

## 2021-06-20 ASSESSMENT — ENCOUNTER SYMPTOMS
CONSTIPATION: 0
SHORTNESS OF BREATH: 0
DIARRHEA: 0
CHEST TIGHTNESS: 0

## 2021-06-20 NOTE — ASSESSMENT & PLAN NOTE
Start lisinopril 10 mg daily. Check BMP in 1 week. Echocardiogram with grade 1 diastolic dysfunction and mild concentric LVH. Lisinopril should help with this. Monitor echo. Consider referral to cardiology as well.

## 2021-06-20 NOTE — ASSESSMENT & PLAN NOTE
Patient did see Dr. Ana Mazariegos for CT surgery. She is going to be followed with serial CT scans- will be repeated in 6 months. No need for surgery at this time.

## 2021-06-28 RX ORDER — PREDNISONE 20 MG/1
20 TABLET ORAL DAILY
Qty: 5 TABLET | Refills: 0 | Status: SHIPPED | OUTPATIENT
Start: 2021-06-28 | End: 2021-07-03

## 2021-06-29 NOTE — PROGRESS NOTES
Mala Garland MD  [facility]) Physicians - Rheumatology    [] [facility]:  [address], [address] [facility] [address] [x] [place] Office:  [address]  [place], [address]   Office: [phone]  Fax: [phone] PROGRESS NOTE    SUBJECTIVE:    Background:   Terrie Alegria is a 64 y.o. female w/ prior Dx of long standing PsO, PsA and fibromyalgia previously followed by rheumatologist (Dr. Cindra Soulier) in [facility].   PMHx pertinent for chronic back pain from DDD, spondylosis and facet arthropathy of L-spine (follows w/ Dr. Judy Wright), KARTHIK noncompliant w/ CPAP, HTN, pre-diabetes, fatty liver, IBS, depression and anxiety.     Per prior rheumatologist, Dr. Lin General note from 1/19/17: Dx of PsA on Humira \"was doing well initially on Humira, still flares, no synovitis\", Humira was switched to Stelara in 3/17.  PsA was noted to improve on Stelara per rheumatologist note from 6/29/17.     Reviewed  Dermatology note from 9/17/18, Dx of PsO w/ PsA \"previously on many biologics per Rheumatology\", exam at the time revealed \"palms w/ few pink thin scaling plaques\".      Current rheum meds:  SSZ at 500 mg BID: started in 3/20  Cymbalta 90 mg daily: prescribed by PCP  Elavil 30 mg QHS: prescribed by PCP  OTC Biotene products, artificial tears     Prior rheum meds:  Meloxicam provided short term pain relief but worsened her GERD, she thinks she took Celecoxib for a couple of yrs in her 30s  Celecoxib 100 mg BID: caused GERD  Medrol dose pack: significantly improves joint pain  MTX: per pt oral MTX caused thrush, she thinks this \"worked a little bit\"  Humira: per pt this was [employer]" but it stopped working after a few months  Stelara: per pt this worked mainly for her joints - she felt this worked but stopped after two months d/t insurance issues   Flexeril: ineffective for back pain  Gabapentin: does not remember response  Paxil, Effexor    Past medical/surgical history, medications and allergies are reviewed and updated as appropriate. Interval Hx:   Pt reports increased morning stiffness in her hand joints lasting 2 hrs. She reports increased soreness in her hand joints, specifically her PIP and DIP joints. Skin remains clear. She reports chronic sicca. She tells me her ophthalmologist confirmed her eye dryness. She is currently using artificial tears and Biotene products. Rheumatologic ROS:  Constitutional: denies chronic fatigue, fever/chills, night sweats, unintentional weight loss  Integumentary: +chronic diffuse hair thinning, denies photosensitivity, rash, or Sx of Raynaud's phenomenon  Eyes: +dry eyes lately she feels \"they're irritating\" has not had any recent eye exam, denies redness or pain, visual disturbance, or floaters  Nose: denies nasal ulcers or recurrent sinusitis  Oral cavity: +dry mouth, denies oral ulcers  Cardiovascular: denies CP, palpitations, Hx of pericardial effusion or pericarditis  Respiratory: denies SOB, cough, hemoptysis, or pleurisy  Gastrointestinal: +chronic GERD refer to above HPI, denies chronic diarrhea or bloody stools  Musculoskeletal:  refer to above HPI     Allergies   Allergen Reactions    Morphine Other (See Comments)     Chest tightness    Norco [Hydrocodone-Acetaminophen] Other (See Comments)     Bad headache       Past Medical History:        Diagnosis Date    Chronic depression     DDD (degenerative disc disease), lumbar     Fatty liver     MRI 3/2021 mild    Fibromyalgia     Hiatal hernia     HTN (hypertension)     Obesity (BMI 30.0-34. 9)     BMI 32.68     KARTHIK (obstructive sleep apnea)     has mask, doesn't wear it    Pneumonia     in her 35s had pna B 7 yrs in a row    Psoriatic arthritis [facility])        Past Surgical History:        Procedure Laterality Date    BLADDER SUSPENSION  2004     SECTION  08/31/1987    x1    CHOLECYSTECTOMY  2000    ENDOMETRIAL ABLATION  2007    HIATAL HERNIA REPAIR N/A 2021 LAPAROSCOPIC PARAESOPHAGEAL HIATAL HERNIA REPAIR, NISSEN FUNDOPLICATION performed by Jerod Albert MD at [address], TOTAL ABDOMINAL  2009    heavy menses    NASAL SEPTUM SURGERY  2005    NERVE SURGERY Bilateral 11/6/2019    BILATERAL L4-5 AND L5-S1 LUMBAR FACET INJECTIONS WITH FLUOROSCOPY performed by Nancy Brennan MD at [place] [address]  2007       Medications:    Current Outpatient Medications   Medication Sig Dispense Refill    sulfaSALAzine (AZULFIDINE) 500 MG tablet Take 2 tablets by mouth 2 times daily 360 tablet 0    hydrOXYzine (ATARAX) 25 MG tablet Take 25 mg by mouth every 8 hours as needed for Itching      predniSONE (DELTASONE) 20 MG tablet Take 1 tablet by mouth daily for 5 days 5 tablet 0    amitriptyline (ELAVIL) 50 MG tablet Take 1 tablet by mouth nightly 90 tablet 1    DULoxetine (CYMBALTA) 60 MG extended release capsule Take 1 capsule by mouth daily Take with 30 mg (Patient taking differently: Take 60 mg by mouth nightly ) 90 capsule 3    busPIRone (BUSPAR) 5 MG tablet TAKE 1 TABLET BY MOUTH 3 TIMES A DAY AS NEEDED FOR ANXIETY 90 tablet 3    DULoxetine (CYMBALTA) 30 MG extended release capsule Take 1 capsule by mouth daily Take with 60 mg Cymbalta (Patient taking differently: Take 30 mg by mouth daily In the morning) 90 capsule 3    metoprolol tartrate (LOPRESSOR) 50 MG tablet Take 1 tablet by mouth 2 times daily 180 tablet 3    SUMAtriptan (IMITREX) 100 MG tablet Take 1 tablet by mouth once as needed for Migraine May repeat once after 2 hours if needed. No more than 2 per 24 hour period.  9 tablet 1    Cholecalciferol (VITAMIN D3) 2000 units CAPS Take by mouth      lisinopril (PRINIVIL;ZESTRIL) 10 MG tablet Take 1 tablet by mouth daily (Patient not taking: Reported on 7/1/2021) 90 tablet 1    pantoprazole (PROTONIX) 40 MG tablet Take 1 tablet by mouth daily (Patient not taking: Reported on 7/1/2021) 90 tablet 3     No current facility-administered medications for this visit. OBJECTIVE:  Physical Exam:  /82   Ht 5' 5\" (1.651 m)   Wt 197 lb (89.4 kg)   BMI 32.78 kg/m²     GEN: AAOx3, in NAD, well-appearing  HEAD: normocephalic, atraumatic  EYES: no injection or icterus  CVS: RRR  LUNGS: in no acute respiratory distress, CTAB  Upper extremities:              Hands: MCP joints w/o swelling NTTP, b/l PIP and DIP joints boggy and TTP, full fist formation w/ good  strength              Wrist: no synovitis in the wrist joints b/l, FROM              Elbow: no synovitis or bursitis, b/l lateral epicondyles slightly TTP, FROM  Lower extremities:              Knees: no warmth or effusion present, FROM              Ankles: no synovitis, FROM, Achilles tendons w/o swelling or warmth NTTP              Feet: no toe swelling or pain or warmth on palpation w/ FROM, negative MTP squeeze test b/l  INTEGUMENT: no active psoriatic lesions, no nail pitting although she has her toenails painted, no patchy alopecia, no other rash, petechiae, bruises, or palpable purpura, no clubbing or digital ulcers    DATA:  Labs:   I personally reviewed interval labs and discussed w/ the pt in detail which showed:    Lab Results   Component Value Date    WBC 3.9 (L) 05/07/2021    HGB 13.3 05/07/2021    HCT 39.5 05/07/2021    MCV 93.7 05/07/2021     05/07/2021    LYMPHOPCT 36.0 05/07/2021    RBC 4.22 05/07/2021    MCH 31.5 05/07/2021    MCHC 33.7 05/07/2021    RDW 15.2 05/07/2021       Chemistry        Component Value Date/Time     06/18/2021 0959    K 4.4 06/18/2021 0959     06/18/2021 0959    CO2 27 06/18/2021 0959    BUN 12 06/18/2021 0959    CREATININE 0.9 06/18/2021 0959        Component Value Date/Time    CALCIUM 9.1 06/18/2021 0959    ALKPHOS 71 12/11/2020 1650    AST 19 05/07/2021 0858    ALT 18 05/07/2021 0858    BILITOT <0.2 12/11/2020 1650        No results found for: Kameron [place]    Lab Results   Component Value Date VITD25 46.5 09/25/2020      Lab Results   Component Value Date    CRP <0.3 03/04/2020     Lab Results   Component Value Date    SEDRATE 10 03/04/2020     Negative RF, CCP (7/24/19)  Negative HLA B27 (7/24/19)  Negative JULIANO, SSA, SSB, C3 and C4 wnl (7/24/19)  Negative hepatitis B and C serologies, Quantiferon TB, HIV screen    Imaging:  I personally reviewed interval imaging and discussed w/ the pt in detail which included:    MRI L-spine (7/10/19): Mild neural foraminal narrowing.  No significant spinal canal stenosis.       X-rays (7/24/19):  R hand:  No fracture or dislocation. No underlying degenerative changes. Joint spaces are normal with no significant osteophytes. No inflammatory changes. No erosions. No soft tissue swelling.      L hand:  No fracture or dislocation. No underlying degenerative changes. Joint spaces are normal with no significant osteophytes.  No inflammatory changes. No erosions. No soft tissue swelling.      R knee:  No fracture or dislocation. No underlying degenerative changes. Joint spaces are normal with no significant osteophytes.  No inflammatory changes. No erosions. No soft tissue swelling.      L knee:  No fracture or dislocation. No underlying degenerative changes. Joint spaces are normal with no significant osteophytes. No inflammatory changes. No erosions. No soft tissue swelling.      SI joints:  Normal, symmetric appearance of the b/l SI joints.  No widening or significant sclerosis.  No erosions are appreciated.  Pelvic bones are otherwise unremarkable.  No significant soft tissue findings.      I independently reviewed above X-rays - mild OA changes in the b/l PIP joints otherwise well preserved joint spaces, no significant juxta-articular osteopenia, no erosive changes seen. Familia Maxwell w/ mild medial joint space narrowing, no chondrocalcinosis.  SI joints patent w/o erosive changes, some sclerosis.     MRI R hand (9/3/19)  No erosive changes or synovitis.      MRI pelvis (9/3/19):   No evidence of active sacroiliitis. Above results were discussed w/ the pt in detail during today's visit. ASSESSMENT/PLAN:   1. Psoriatic arthritis ([facility] [address]  - pt repots worsening arthralgias, she has mild bogginess in her b/l PIP and DIP joints on exam today. As such will increase her SSZ dose to 1000 mg BID. Pt agreed to adding low dose MTX next if we need to escalate her IS  - sulfaSALAzine (AZULFIDINE) 500 MG tablet; Take 2 tablets by mouth 2 times daily  Dispense: 360 tablet; Refill: 0  - C-REACTIVE PROTEIN; Future    2. Psoriasis  - skin remains clear  - sulfaSALAzine (AZULFIDINE) 500 MG tablet; Take 2 tablets by mouth 2 times daily  Dispense: 360 tablet; Refill: 0  - C-REACTIVE PROTEIN; Future    3. High risk medication use  - she will obtain safety labs 4 wks after increasing her SSZ dose  - ALT; Future  - AST; Future  - CBC Auto Differential; Future  - Creatinine, Serum; Future    4. Fibromyalgia  - well controlled on Duloxetine and Elavil. 5. Sicca complex ([facility] [address]  - discussed trying secretagogue again, pt declined  - cont OTC artificial tears and Biotene products    6. Encounter for therapeutic drug monitoring  - ALT; Future  - AST; Future  - CBC Auto Differential; Future  - Creatinine, Serum; Future    Return in about 3 months (around 10/1/2021) for lab result discussion and treatment plan, medication monitoring. The risks and benefits of my recommendations, as well as other treatment options, benefits and side effects were discussed with the patient today. Questions were answered. NOTE: This report is transcribed by using voice recognition software dragon. Every effort is made to ensure accuracy; however, inadvertent computerized  transcription errors may be present.

## 2021-07-01 ENCOUNTER — OFFICE VISIT (OUTPATIENT)
Dept: RHEUMATOLOGY | Age: 56
End: 2021-07-01
Payer: COMMERCIAL

## 2021-07-01 VITALS
SYSTOLIC BLOOD PRESSURE: 112 MMHG | HEIGHT: 65 IN | WEIGHT: 197 LBS | BODY MASS INDEX: 32.82 KG/M2 | DIASTOLIC BLOOD PRESSURE: 82 MMHG

## 2021-07-01 DIAGNOSIS — L40.50 PSORIATIC ARTHRITIS (HCC): Primary | ICD-10-CM

## 2021-07-01 DIAGNOSIS — M35.00 SICCA COMPLEX (HCC): ICD-10-CM

## 2021-07-01 DIAGNOSIS — Z51.81 ENCOUNTER FOR THERAPEUTIC DRUG MONITORING: ICD-10-CM

## 2021-07-01 DIAGNOSIS — L40.9 PSORIASIS: ICD-10-CM

## 2021-07-01 DIAGNOSIS — Z79.899 HIGH RISK MEDICATION USE: ICD-10-CM

## 2021-07-01 DIAGNOSIS — M79.7 FIBROMYALGIA: ICD-10-CM

## 2021-07-01 PROCEDURE — 99214 OFFICE O/P EST MOD 30 MIN: CPT | Performed by: INTERNAL MEDICINE

## 2021-07-01 RX ORDER — SULFASALAZINE 500 MG/1
1000 TABLET ORAL 2 TIMES DAILY
Qty: 360 TABLET | Refills: 0 | Status: SHIPPED | OUTPATIENT
Start: 2021-07-01 | End: 2021-11-03 | Stop reason: SDUPTHER

## 2021-07-01 RX ORDER — HYDROXYZINE HYDROCHLORIDE 25 MG/1
25 TABLET, FILM COATED ORAL EVERY 8 HOURS PRN
COMMUNITY
End: 2021-08-15 | Stop reason: ALTCHOICE

## 2021-07-12 ENCOUNTER — OFFICE VISIT (OUTPATIENT)
Dept: PSYCHOLOGY | Age: 56
End: 2021-07-12
Payer: COMMERCIAL

## 2021-07-12 DIAGNOSIS — F33.0 MILD EPISODE OF RECURRENT MAJOR DEPRESSIVE DISORDER (HCC): Primary | ICD-10-CM

## 2021-07-12 PROCEDURE — 90832 PSYTX W PT 30 MINUTES: CPT | Performed by: PSYCHOLOGIST

## 2021-07-12 ASSESSMENT — PATIENT HEALTH QUESTIONNAIRE - PHQ9
1. LITTLE INTEREST OR PLEASURE IN DOING THINGS: 1
4. FEELING TIRED OR HAVING LITTLE ENERGY: 1
8. MOVING OR SPEAKING SO SLOWLY THAT OTHER PEOPLE COULD HAVE NOTICED. OR THE OPPOSITE, BEING SO FIGETY OR RESTLESS THAT YOU HAVE BEEN MOVING AROUND A LOT MORE THAN USUAL: 0
10. IF YOU CHECKED OFF ANY PROBLEMS, HOW DIFFICULT HAVE THESE PROBLEMS MADE IT FOR YOU TO DO YOUR WORK, TAKE CARE OF THINGS AT HOME, OR GET ALONG WITH OTHER PEOPLE: 1
6. FEELING BAD ABOUT YOURSELF - OR THAT YOU ARE A FAILURE OR HAVE LET YOURSELF OR YOUR FAMILY DOWN: 0
SUM OF ALL RESPONSES TO PHQ QUESTIONS 1-9: 4
9. THOUGHTS THAT YOU WOULD BE BETTER OFF DEAD, OR OF HURTING YOURSELF: 0
2. FEELING DOWN, DEPRESSED OR HOPELESS: 1
7. TROUBLE CONCENTRATING ON THINGS, SUCH AS READING THE NEWSPAPER OR WATCHING TELEVISION: 0
5. POOR APPETITE OR OVEREATING: 1
SUM OF ALL RESPONSES TO PHQ9 QUESTIONS 1 & 2: 2
SUM OF ALL RESPONSES TO PHQ QUESTIONS 1-9: 4
3. TROUBLE FALLING OR STAYING ASLEEP: 0
SUM OF ALL RESPONSES TO PHQ QUESTIONS 1-9: 4

## 2021-07-12 NOTE — PROGRESS NOTES
Behavioral Health Consultation  Eldon Cardenas, Ph.D.  Lucy Perales Psychologist  7/12/2021  8:36 AM      Time spent with Patient: 25 minutes  This is patient's first [facility] appointment. Reason for Consult:    Chief Complaint   Patient presents with    Depression       Pt provided informed consent for the behavioral health program. Discussed with patient model of service to include the limits of confidentiality (i.e. abuse reporting, suicide intervention, etc.) and short-term intervention focused approach. Pt indicated understanding. Feedback given to PCP. S:  Pt seen per PCP re: h/o depression. Seen by this provider for first time in 7 mos. Reported she is seeing me today bc she was told she needed to be seen intermittently, provided feedback that this is not accurate. Wanted to discuss her daughter's [place]) bxs. Noted daughter had h/o drug addiction and trauma (held at gun point, abusive relationship). Sees daughter spending free time in bed, loss of appetite, shorter temper. She is still dating boyfriend that is addicted to heroin and meth, though initially lied about this, but has moved out of living w him and now lives w pt. Son continues to live w pt and hates Shanon Juan and won't speak to her. Pt stated she knows she gets taken advantage of by daughter, \"I don't want to be taken advantage of, but I don't want to leave her hanging. \"     Pt has attempted to set financial boundaries w daughter. Daughter currently not paying rent, allowed her use her credit card for divorce and other expenses ($1200), only paying the miminum, so it's not reducing; pays Internet most months. Daughter works FT for [employer], allowed her SNAP benefits to lapse. Has attempted to set boundaries which results in only ST changes. Discussed setting and maintaining boundaries.      Oldest daughter lives w melecio, getting  in Oct.     O:  MSE:    Appearance    alert, cooperative  Impulsive behavior Yes  Speech spontaneous, normal rate and normal volume  Mood    euthymic  Affect    normal affect  Thought Content    intact  Thought Process    linear, goal directed and coherent  Associations    logical connections  Insight    Fair  Judgment    Intact  Orientation    oriented to person, place, time, and general circumstances  Memory    recent and remote memory intact  Attention/Concentration    intact  Morbid ideation No  Suicide Assessment    no suicidal ideation    History:    Social History:   Social History     Socioeconomic History    Marital status:      Spouse name: Not on file    Number of children: Not on file    Years of education: Not on file    Highest education level: Not on file   Occupational History    Not on file   Tobacco Use    Smoking status: Never Smoker    Smokeless tobacco: Never Used   Vaping Use    Vaping Use: Never used   Substance and Sexual Activity    Alcohol use: Yes     Comment: every month or so    Drug use: Yes     Types: Marijuana     Comment: last 2019    Sexual activity: Not on file   Other Topics Concern    Not on file   Social History Narrative    Not on file     Social Determinants of Health     Financial Resource Strain:     Difficulty of Paying Living Expenses:    Food Insecurity:     Worried About [address] in the Last Year:     [address] [employer] in the Last Year:    Transportation Needs:     Lack of Transportation (Medical):      Lack of Transportation (Non-Medical):    Physical Activity:     Days of Exercise per Week:     Minutes of Exercise per Session:    Stress:     Feeling of Stress :    Social Connections:     Frequency of Communication with Friends and Family:     Frequency of Social Gatherings with Friends and Family:     Attends Restoration Services:     Active Member of Clubs or Organizations:     Attends Club or Organization Meetings:     Marital Status:    Intimate Partner Violence:     Fear of Current or Ex-Partner:     Emotionally Abused:     Physically Abused:     Sexually Abused:      TOBACCO:   reports that she has never smoked. She has never used smokeless tobacco.  ETOH:   reports current alcohol use. A:  Administered PHQ-9 (see below). Patient endorses minimal symptoms of depression. Denied SI/HI. PHQ Scores 7/12/2021 6/12/2019   PHQ2 Score 2 1   PHQ9 Score 4 1     Interpretation of Total Score Depression Severity: 1-4 = Minimal depression, 5-9 = Mild depression, 10-14 = Moderate depression, 15-19 = Moderately severe depression, 20-27 = Severe depression      Diagnosis:  Major depressive disorder; recurrent and mild    Plan:  Pt interventions:  Trained in improving communication skills, Established rapport, [place]-setting to identify pt's primary goals for [facility] visit / overall health and Supportive techniques    Documentation was done using voice recognition dragon software. Every effort was made to ensure accuracy; however, inadvertent, unintentional computerized transcription errors may be present.

## 2021-07-16 RX ORDER — BUSPIRONE HYDROCHLORIDE 5 MG/1
TABLET ORAL
Qty: 90 TABLET | Refills: 3 | Status: SHIPPED | OUTPATIENT
Start: 2021-07-16 | End: 2021-09-17 | Stop reason: SDUPTHER

## 2021-07-18 DIAGNOSIS — I51.7 LVH (LEFT VENTRICULAR HYPERTROPHY): Primary | ICD-10-CM

## 2021-07-18 DIAGNOSIS — I51.89 DIASTOLIC DYSFUNCTION: ICD-10-CM

## 2021-07-18 DIAGNOSIS — Z86.16 HISTORY OF COVID-19: ICD-10-CM

## 2021-07-18 RX ORDER — OLMESARTAN MEDOXOMIL 20 MG/1
10 TABLET ORAL DAILY
Qty: 30 TABLET | Refills: 3 | Status: SHIPPED | OUTPATIENT
Start: 2021-07-18 | End: 2022-02-14

## 2021-08-12 ENCOUNTER — OFFICE VISIT (OUTPATIENT)
Dept: CARDIOLOGY CLINIC | Age: 56
End: 2021-08-12
Payer: COMMERCIAL

## 2021-08-12 VITALS
HEIGHT: 65 IN | BODY MASS INDEX: 32.46 KG/M2 | OXYGEN SATURATION: 96 % | DIASTOLIC BLOOD PRESSURE: 74 MMHG | WEIGHT: 194.8 LBS | SYSTOLIC BLOOD PRESSURE: 106 MMHG | HEART RATE: 58 BPM

## 2021-08-12 DIAGNOSIS — E78.2 MIXED HYPERLIPIDEMIA: ICD-10-CM

## 2021-08-12 DIAGNOSIS — I51.7 LVH (LEFT VENTRICULAR HYPERTROPHY): Primary | ICD-10-CM

## 2021-08-12 DIAGNOSIS — R06.02 SHORTNESS OF BREATH: ICD-10-CM

## 2021-08-12 DIAGNOSIS — R73.03 PREDIABETES: ICD-10-CM

## 2021-08-12 DIAGNOSIS — I51.89 DIASTOLIC DYSFUNCTION: ICD-10-CM

## 2021-08-12 PROCEDURE — 93000 ELECTROCARDIOGRAM COMPLETE: CPT | Performed by: INTERNAL MEDICINE

## 2021-08-12 PROCEDURE — 99204 OFFICE O/P NEW MOD 45 MIN: CPT | Performed by: INTERNAL MEDICINE

## 2021-08-12 NOTE — Clinical Note
Thanks for sending over [facility]. LVH likely secondary to hypertension since her 35s. Her bp is much better controlled with your medication changes. Shortness of breath sounds like it has been going on since surgery. I will get some Pfts, hopefully no diaphragm damage. I will see her back. Thanks!   Kyler Encinas

## 2021-08-12 NOTE — PROGRESS NOTES
[address]  21  Referring: Dr. Eric Ratliff CONSULT/CHIEF COMPLAINT/HPI     Reason for visit/ Chief complaint  New patient  Shortness of breath   HPI Riley Marroquin is a 64 y.o. Seen as a new patient for shortness of breath and lvh. She has a history of asthma ( diagnosed after delivery of her 32year old son). She had gestational diabetes with her second child, now is prediabetic. She is an  for a dental practice. She has had a stressful 5 years, her sister, mother, and step son . Her marriage ended. About 5 years ago she was short of breath riding a stationary bike. States she had a full cardiac evaluation ( in ) and nothing was found. She had COVID in October. Had a hiatal hernia repair 21. Several weeks after the surgery she noticed shortness of breath   when walking up one flight of stairs, alleviated with rest.  No chest pain palpitations dizziness or syncope. She had a CT scan that found an incidental Anterior mediastinal mass. She was evaluated by Dr Elke Machado, and she will have reimaging in 6 months. She had an echo to evaluate shortness of breath that showed grade 1 diastolic dysfunction. Patient is compliant with medications and is tolerating them well without side effects     HISTORY/ALLERGIES/ROS     MedHx:  has a past medical history of Chronic depression, DDD (degenerative disc disease), lumbar, Fatty liver, Fibromyalgia, Hiatal hernia, HTN (hypertension), Obesity (BMI 30.0-34.9), KARTHIK (obstructive sleep apnea), Pneumonia, and Psoriatic arthritis ([facility] [address] SurgHx:  has a past surgical history that includes  section (1987); Cholecystectomy (); Nasal septum surgery (); bladder suspension (); turbinate resection (); Hysterectomy, total abdominal (); Endometrial ablation (); Nerve Surgery (Bilateral, 2019); and hiatal hernia repair (N/A, 2021).    SocHx:  reports that she has never smoked. She has never used smokeless tobacco. She reports current alcohol use. She reports current drug use. Drug: Marijuana. FamHx: Grandparents with heart disease  Allergies: Morphine and Norco [hydrocodone-acetaminophen]   ROS:   Review of Systems   Constitutional: Positive for fatigue. Negative for activity change, diaphoresis and fever. HENT: Negative for congestion and ear discharge. Eyes: Negative for photophobia and visual disturbance. Respiratory: Positive for shortness of breath. Negative for cough and chest tightness. Cardiovascular: Negative for chest pain and palpitations. Gastrointestinal: Negative for abdominal distention, abdominal pain, blood in stool and nausea. Endocrine: Negative for cold intolerance and polydipsia. Genitourinary: Negative for difficulty urinating and flank pain. Musculoskeletal: Positive for arthralgias and myalgias. Skin: Negative for rash and wound. Allergic/Immunologic: Negative for environmental allergies and immunocompromised state. Neurological: Negative for dizziness and headaches. Hematological: Negative for adenopathy. Does not bruise/bleed easily. Psychiatric/Behavioral: Negative for confusion. The patient is not hyperactive. MEDICATIONS      Prior to Admission medications    Medication Sig Start Date End Date Taking?  Authorizing Provider   olmesartan (BENICAR) 20 MG tablet Take 0.5 tablets by mouth daily 7/18/21  Yes Jayla Dawson DO   busPIRone (BUSPAR) 5 MG tablet TAKE 1 TABLET 3 TIMES A DAYAS NEEDED FOR ANXIETY 7/16/21  Yes Jayla Dawson DO   sulfaSALAzine (AZULFIDINE) 500 MG tablet Take 2 tablets by mouth 2 times daily 7/1/21 9/29/21 Yes Ronn Gabriel MD   amitriptyline (ELAVIL) 50 MG tablet Take 1 tablet by mouth nightly 5/25/21  Yes Jayla Dawson DO   DULoxetine (CYMBALTA) 60 MG extended release capsule Take 1 capsule by mouth daily Take with 30 mg  Patient taking differently: Take 60 mg by mouth nightly 2/10/21  Yes Yoandy Garcia DO   DULoxetine (CYMBALTA) 30 MG extended release capsule Take 1 capsule by mouth daily Take with 60 mg Cymbalta  Patient taking differently: Take 30 mg by mouth daily In the morning 1/27/21 8/12/21 Yes Yoandy Garcia DO   metoprolol tartrate (LOPRESSOR) 50 MG tablet Take 1 tablet by mouth 2 times daily 1/27/21  Yes Yoandy Garcia DO   SUMAtriptan (IMITREX) 100 MG tablet Take 1 tablet by mouth once as needed for Migraine May repeat once after 2 hours if needed. No more than 2 per 24 hour period. 3/9/20 8/12/21 Yes Yoandy Garcia DO   Cholecalciferol (VITAMIN D3) 2000 units CAPS Take by mouth   Yes Historical Provider, MD   hydrOXYzine (ATARAX) 25 MG tablet Take 25 mg by mouth every 8 hours as needed for Itching    Historical Provider, MD   pantoprazole (PROTONIX) 40 MG tablet Take 1 tablet by mouth daily  Patient not taking: Reported on 7/1/2021 8/14/20   Yoandy Garcia DO       PHYSICAL EXAM        Vitals:    08/12/21 1005   BP: 106/74   Pulse: 58   SpO2: 96%    Weight: 194 lb 12.8 oz (88.4 kg)     Gen Alert, cooperative, no distress Heart  Sinus bradycardia. No murmur   Head Normocephalic, atraumatic, no abnormalities Abd  Soft, NT, +BS, no mass, no OM   Eyes PERRLA, conj/corn clear Ext  Ext nl, AT, no C/C, no edema   Nose Nares normal, no drain age, Non-tender Pulse 2+ and symmetric   Throat Lips, mucosa, tongue normal Skin Color/text/turg nl, no rash/lesions   Neck S/S, TM, NT, no bruit Psych Nl mood and affect   Lung  CTA-B, unlabored, no DTP     Ch wall NT, no deform       LABS and Imaging     Relevant and available CV data reviewed  Echo/MRI: Left ventricular systolic function is normal with ejection fraction   estimated at 55-60 %.   -No regional wall motion abnormalities are noted. -There is mild concentric left ventricular hypertrophy. -Grade I diastolic dysfunction with normal LV filling pressures.    -Aortic valve appears very mildly sclerotic but opens adequately. -Mild tricuspid regurgitation.   -Systolic pulmonary artery pressure (SPAP) is normal and estimated at 23   mmHg (right atrial pressure 3 mmHg). -IVC is normal in size (< 2.1 cm) and collapses > 50% with respiration   consistent with normal RA pressure (3 mmHg). Ct of chest 3/5/21--  No pulmonary embolus identified.       Solid nodule in the retrosternal region, likely abnormally enlarged lymph   node.  If no prior CT chest exams exist for comparison, recommend PET-CT to   evaluate for any metabolic activity which could necessitate biopsy       Postsurgical change at the GE junction.  Trace pleural fluid is seen   bilaterally, with adjacent opacity at the lung bases, likely atelectasis     3/29/21   1. Stable, nonenhancing mass in the anterior mediastinum with no evidence of   hypermetabolism.  Underlying malignancy is extremely unlikely.  Differential   considerations would include a pericardial cyst, chronic/remote history of   pericardial effusion, fibroma and less likely teratoma. 2. No other significant findings. Cath: none  Holter:none  EKG personally interpreted today: sinus juan, nonspecific st-t wave changes  Stress:none  CT chest examined  moderate Risk  moderate Complexity/Medical Decision Making  Outside records Reviewed  Labs Reviewed  Prior Imaging, ekg, echo reviewed when available  Medications reviewed  Old Notes reviewed  ASSESSMENT AND PLAN     1. Shortness of breath  - new problem  - has asthma(  Uses inhaler three times a year)  - potentially ischemia, less likely diaphragm damage from surgery  Plan  - PFT with and without bronchodilator, consider sniff test  - stress test with pulse oximetry to evaluate  - will compare recent echo with echo done 5 years ago in [address], [address] [employer]  Phone     2.  Essential Hypertension  - diagnosed in her 29's  - 106/74  - on benicar 10 mg daily  - on lopressor 50 mg bid  - likely cause of LVH  Plan  - continue benicar and lopressor    3. Anterior mediastinal mass  - incidental finding on CT of chest  - followed and evaluated by  Dr Sierra Fortune in 6 months    4. KARTHIK  - prescribed cpap, but unable to wear it  Plan  - recommend wearing cpap    5. Diabetes  - gestational diabetes  Plan  - recommend exercise 150 minutes a week  - not interested in seeing a dietician  - higher risk for cad and stroke    6. Mixed Hyperlipidemia  - 6/11/2021  Tc 182 hdl 52 ldl 111 tri 95  Plan  - dietary management at this time, will continue dietary management  - recheck next visit, start statin if no improvement    Patient counseled on lifestyle modification, diet, and exercise. Follow Up:   6 months    Dr. Lester Haw:  I, Kim Frye, am scribing for and in the presence of Erasmo Chinchilla MD.   Moon Ramirez 08/12/21 3:51 PM   Physician Attestation  The scribe for and in the presence of milton Santos DO). The scribe Cheryl Vigil may have prepopulated components of this note with my historical  intellectual property under my direct supervision. Any additions to this intellectual property were performed in my presence and at my direction.   Furthermore, the content and accuracy of this note have been reviewed by milton Santos DO).  8/15/2021 9:10 PM

## 2021-08-15 ASSESSMENT — ENCOUNTER SYMPTOMS
NAUSEA: 0
PHOTOPHOBIA: 0
BLOOD IN STOOL: 0
CHEST TIGHTNESS: 0
ABDOMINAL PAIN: 0
COUGH: 0
SHORTNESS OF BREATH: 1
ABDOMINAL DISTENTION: 0

## 2021-08-19 ENCOUNTER — HOSPITAL ENCOUNTER (OUTPATIENT)
Dept: PULMONOLOGY | Age: 56
Discharge: HOME | End: 2021-08-19
Payer: COMMERCIAL

## 2021-08-19 ENCOUNTER — HOSPITAL ENCOUNTER (OUTPATIENT)
Dept: NON INVASIVE DIAGNOSTICS | Age: 56
Discharge: HOME | End: 2021-08-19
Payer: COMMERCIAL

## 2021-08-19 VITALS — HEART RATE: 93 BPM | RESPIRATION RATE: 16 BRPM | OXYGEN SATURATION: 100 %

## 2021-08-19 DIAGNOSIS — R06.02 SHORTNESS OF BREATH: ICD-10-CM

## 2021-08-19 DIAGNOSIS — I51.7 LVH (LEFT VENTRICULAR HYPERTROPHY): ICD-10-CM

## 2021-08-19 LAB
DLCO %PRED: 70 %
DLCO PRED: NORMAL
DLCO/VA %PRED: NORMAL
DLCO/VA PRED: NORMAL
DLCO/VA: NORMAL
DLCO: NORMAL
EXPIRATORY TIME-POST: NORMAL
EXPIRATORY TIME: NORMAL
FEF 25-75% %CHNG: NORMAL
FEF 25-75% %PRED-POST: NORMAL
FEF 25-75% %PRED-PRE: NORMAL
FEF 25-75% PRED: NORMAL
FEF 25-75%-POST: NORMAL
FEF 25-75%-PRE: NORMAL
FEV1 %PRED-POST: 106 %
FEV1 %PRED-PRE: 84 %
FEV1 PRED: NORMAL
FEV1-POST: NORMAL
FEV1-PRE: NORMAL
FEV1/FVC %PRED-POST: NORMAL
FEV1/FVC %PRED-PRE: NORMAL
FEV1/FVC PRED: NORMAL
FEV1/FVC-POST: NORMAL %
FEV1/FVC-PRE: NORMAL %
FVC %PRED-POST: NORMAL
FVC %PRED-PRE: NORMAL
FVC PRED: NORMAL
FVC-POST: NORMAL
FVC-PRE: NORMAL
GAW %PRED: NORMAL
GAW PRED: NORMAL
GAW: NORMAL
IC %PRED: NORMAL
IC PRED: NORMAL
IC: NORMAL
MEP: NORMAL
MIP: NORMAL
MVV %PRED-PRE: NORMAL
MVV PRED: NORMAL
MVV-PRE: NORMAL
PEF %PRED-POST: NORMAL
PEF %PRED-PRE: NORMAL
PEF PRED: NORMAL
PEF%CHNG: NORMAL
PEF-POST: NORMAL
PEF-PRE: NORMAL
RAW %PRED: NORMAL
RAW PRED: NORMAL
RAW: NORMAL
RV %PRED: NORMAL
RV PRED: NORMAL
RV: NORMAL
SVC %PRED: NORMAL
SVC PRED: NORMAL
SVC: NORMAL
TLC %PRED: 96 %
TLC PRED: NORMAL
TLC: NORMAL
VA %PRED: NORMAL
VA PRED: NORMAL
VA: NORMAL
VTG %PRED: NORMAL
VTG PRED: NORMAL
VTG: NORMAL

## 2021-08-19 PROCEDURE — 93017 CV STRESS TEST TRACING ONLY: CPT | Performed by: INTERNAL MEDICINE

## 2021-08-19 PROCEDURE — 94010 BREATHING CAPACITY TEST: CPT

## 2021-08-19 PROCEDURE — 94726 PLETHYSMOGRAPHY LUNG VOLUMES: CPT

## 2021-08-19 PROCEDURE — 94729 DIFFUSING CAPACITY: CPT

## 2021-08-19 PROCEDURE — 94200 LUNG FUNCTION TEST (MBC/MVV): CPT

## 2021-08-19 PROCEDURE — 94760 N-INVAS EAR/PLS OXIMETRY 1: CPT

## 2021-08-19 RX ORDER — ALBUTEROL SULFATE 90 UG/1
4 AEROSOL, METERED RESPIRATORY (INHALATION) ONCE
Status: CANCELLED | OUTPATIENT
Start: 2021-08-19

## 2021-08-19 ASSESSMENT — PULMONARY FUNCTION TESTS
FEV1_PERCENT_PREDICTED_PRE: 84
FEV1_PERCENT_PREDICTED_POST: 106

## 2021-08-19 NOTE — PROGRESS NOTES
Patient instructed on Audie Protocol Stress Test Procedure including possible side effects and adverse reactions. Verbalizes knowledge and understanding and denies having any questions.

## 2021-08-20 NOTE — PROCEDURES
Pulmonary Function Testing      Patient name:  Riley Marroquin     [facility] Unit #:   [id number]   Date of test: 8/19/2021  Date of interpretation:   8/20/2021    Ms. Riley Marroquin is a 64y.o. year-old non smoker. The spirometry data were acceptable and reproducible. Spirometry:  Flow volume loops were normal. The FEV-1/FVC ratio was normal. The   FEV-1 was 2.31 liters (84% of predicted), which was normal. The FVC was 2.75 liters (78% of predicted), which was decreased. Response to inhaled bronchodilators (albuterol) was not performed. Lung volumes:  Lung volumes were tested by plethysmography. The total lung capacity was 4.93 liters (96% of predicted), which was normal. The residual volume was 2.18 liters (115% of predicted), which was normal. The ratio of residual volume to total lung capacity (RV/TLC) was 121%, which was increased. Specific airway resistance was increased. Diffusion capacity was found to be decreased. Interpretation:  Possible mild obstructive airway disease with reduced diffusion capacity.

## 2021-09-17 ENCOUNTER — OFFICE VISIT (OUTPATIENT)
Dept: INTERNAL MEDICINE CLINIC | Age: 56
End: 2021-09-17
Payer: COMMERCIAL

## 2021-09-17 VITALS
BODY MASS INDEX: 32.55 KG/M2 | WEIGHT: 195.6 LBS | DIASTOLIC BLOOD PRESSURE: 86 MMHG | OXYGEN SATURATION: 95 % | SYSTOLIC BLOOD PRESSURE: 116 MMHG | HEART RATE: 64 BPM

## 2021-09-17 DIAGNOSIS — M51.36 DDD (DEGENERATIVE DISC DISEASE), LUMBAR: ICD-10-CM

## 2021-09-17 DIAGNOSIS — G43.109 MIGRAINE WITH AURA AND WITHOUT STATUS MIGRAINOSUS, NOT INTRACTABLE: ICD-10-CM

## 2021-09-17 DIAGNOSIS — M79.7 FIBROMYALGIA: ICD-10-CM

## 2021-09-17 DIAGNOSIS — E78.5 MILD HYPERLIPIDEMIA: ICD-10-CM

## 2021-09-17 DIAGNOSIS — I10 ESSENTIAL HYPERTENSION: ICD-10-CM

## 2021-09-17 DIAGNOSIS — R73.03 PRE-DIABETES: ICD-10-CM

## 2021-09-17 DIAGNOSIS — Q24.8 PERICARDIAL CYST: Primary | ICD-10-CM

## 2021-09-17 DIAGNOSIS — I51.7 LVH (LEFT VENTRICULAR HYPERTROPHY): ICD-10-CM

## 2021-09-17 DIAGNOSIS — Z86.16 HISTORY OF 2019 NOVEL CORONAVIRUS DISEASE (COVID-19): ICD-10-CM

## 2021-09-17 DIAGNOSIS — F41.9 ANXIETY: ICD-10-CM

## 2021-09-17 PROBLEM — J02.0 PHARYNGITIS DUE TO STREPTOCOCCUS SPECIES: Status: RESOLVED | Noted: 2019-12-11 | Resolved: 2021-09-17

## 2021-09-17 PROCEDURE — 90471 IMMUNIZATION ADMIN: CPT | Performed by: INTERNAL MEDICINE

## 2021-09-17 PROCEDURE — 90674 CCIIV4 VAC NO PRSV 0.5 ML IM: CPT | Performed by: INTERNAL MEDICINE

## 2021-09-17 PROCEDURE — 99214 OFFICE O/P EST MOD 30 MIN: CPT | Performed by: INTERNAL MEDICINE

## 2021-09-17 RX ORDER — AMITRIPTYLINE HYDROCHLORIDE 50 MG/1
50 TABLET, FILM COATED ORAL NIGHTLY
Qty: 180 TABLET | Refills: 1 | Status: SHIPPED | OUTPATIENT
Start: 2021-09-17 | End: 2022-02-14

## 2021-09-17 RX ORDER — BUSPIRONE HYDROCHLORIDE 5 MG/1
5 TABLET ORAL 3 TIMES DAILY PRN
Qty: 90 TABLET | Refills: 3 | Status: SHIPPED | OUTPATIENT
Start: 2021-09-17 | End: 2022-04-11

## 2021-09-17 RX ORDER — PREDNISONE 10 MG/1
TABLET ORAL
Qty: 30 TABLET | Refills: 0 | Status: SHIPPED | OUTPATIENT
Start: 2021-09-17 | End: 2021-10-22 | Stop reason: ALTCHOICE

## 2021-09-17 NOTE — PROGRESS NOTES
Patient: Bo Miranda is a 64 y.o. female who presents today with the following Chief Complaint(s):  Chief Complaint   Patient presents with    Check-Up       HPI     Here today for follow up. Is going to [facility] for her daughter's wedding in about 10 days and is having back pain. Asking for steroids to help with her back pain. HTN- doing very well with Benicar 10 mg and Lopressor. 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 Is on amitriptyline 50 mg qhs currently. Fibromyalgia - can tell when she is stressed but discomfort is not keeping her awake. Breathing is getting better. Did have a normal stress test and normal PFT's. Trying to increase activity level by walking walking her dog more. Did see Dr. Maryjane Smiley for cardiology for LVH and shortness of breath. Underwent stress test that was normal.  EKG with mildly prolonged QT interval at 425 µs. Is to follow-up with Dr. Maryjane Smiley again in 6 months. Is due for repeat CT of her chest this month for follow-up on the pericardial cyst.      Allergies   Allergen Reactions    Morphine Other (See Comments)     Chest tightness    Norco [Hydrocodone-Acetaminophen] Other (See Comments)     Bad headache      Past Medical History:   Diagnosis Date    Chronic depression     DDD (degenerative disc disease), lumbar     Fatty liver     MRI 3/2021 mild    Fibromyalgia     Hiatal hernia     HTN (hypertension)     Obesity (BMI 30.0-34. 9)     BMI 32.68     KARTHIK (obstructive sleep apnea)     has mask, doesn't wear it    Pneumonia     in her 35s had pna B 7 yrs in a row    Psoriatic arthritis [facility])       Past Surgical History:   Procedure Laterality Date    BLADDER SUSPENSION  2004     SECTION  08/31/1987    x1    CHOLECYSTECTOMY  2000    ENDOMETRIAL ABLATION  2007    HIATAL HERNIA REPAIR N/A 2021 LAPAROSCOPIC PARAESOPHAGEAL HIATAL HERNIA REPAIR, NISSEN FUNDOPLICATION performed by Kelly Paz MD at [facility], TOTAL ABDOMINAL  2009    heavy menses    NASAL SEPTUM SURGERY  2005    NERVE SURGERY Bilateral 11/6/2019    BILATERAL L4-5 AND L5-S1 LUMBAR FACET INJECTIONS WITH FLUOROSCOPY performed by Janice Fernandez MD at [place] [address]  2007      Social History     Socioeconomic History    Marital status:      Spouse name: Not on file    Number of children: Not on file    Years of education: Not on file    Highest education level: Not on file   Occupational History    Not on file   Tobacco Use    Smoking status: Never Smoker    Smokeless tobacco: Never Used   Vaping Use    Vaping Use: Never used   Substance and Sexual Activity    Alcohol use: Yes     Comment: every month or so    Drug use: Yes     Types: Marijuana     Comment: last 2019    Sexual activity: Not on file   Other Topics Concern    Not on file   Social History Narrative    Not on file     Social Determinants of Health     Financial Resource Strain:     Difficulty of Paying Living Expenses:    Food Insecurity:     Worried About [address] [employer] in the Last Year:     [address] [employer] in the Last Year:    Transportation Needs:     Lack of Transportation (Medical):      Lack of Transportation (Non-Medical):    Physical Activity:     Days of Exercise per Week:     Minutes of Exercise per Session:    Stress:     Feeling of Stress :    Social Connections:     Frequency of Communication with Friends and Family:     Frequency of Social Gatherings with Friends and Family:     Attends Mu-ism Services:     Active Member of Clubs or Organizations:     Attends Club or Organization Meetings:     Marital Status:    Intimate Partner Violence:     Fear of Current or Ex-Partner:     Emotionally Abused:     Physically Abused:     Sexually Abused:      Family History   Problem Relation Age of Onset    Cancer Mother         gallbladder cancer    High Blood Pressure Mother     Alcohol Abuse Mother     High Blood Pressure Father    [de-identified] Cancer Sister         small cell lung cancer (smoker)    Heart Disease Maternal Grandmother     Heart Disease Maternal Grandfather     Stroke Maternal Grandfather     Heart Disease Paternal Grandmother     Heart Disease Paternal Grandfather     No Known Problems Half-Brother     No Known Problems Half-Sister     Other Half-Sibling         skin cancer    Other Half-Sister         blood clots    Substance Abuse Daughter 32        in remission         Outpatient Medications Prior to Visit   Medication Sig Dispense Refill    olmesartan (BENICAR) 20 MG tablet Take 0.5 tablets by mouth daily 30 tablet 3    sulfaSALAzine (AZULFIDINE) 500 MG tablet Take 2 tablets by mouth 2 times daily 360 tablet 0    DULoxetine (CYMBALTA) 60 MG extended release capsule Take 1 capsule by mouth daily Take with 30 mg (Patient taking differently: Take 60 mg by mouth nightly ) 90 capsule 3    metoprolol tartrate (LOPRESSOR) 50 MG tablet Take 1 tablet by mouth 2 times daily 180 tablet 3    Cholecalciferol (VITAMIN D3) 2000 units CAPS Take by mouth      busPIRone (BUSPAR) 5 MG tablet TAKE 1 TABLET 3 TIMES A DAYAS NEEDED FOR ANXIETY 90 tablet 3    amitriptyline (ELAVIL) 50 MG tablet Take 1 tablet by mouth nightly 90 tablet 1    DULoxetine (CYMBALTA) 30 MG extended release capsule Take 1 capsule by mouth daily Take with 60 mg Cymbalta (Patient taking differently: Take 30 mg by mouth daily In the morning) 90 capsule 3    SUMAtriptan (IMITREX) 100 MG tablet Take 1 tablet by mouth once as needed for Migraine May repeat once after 2 hours if needed. No more than 2 per 24 hour period. 9 tablet 1     No facility-administered medications prior to visit.        Patient'spast medical history, surgical history, family history, medications,  and allergies  were all reviewed and updated as appropriate today. Review of Systems    /86   Pulse 64   Wt 195 lb 9.6 oz (88.7 kg)   SpO2 95%   BMI 32.55 kg/m²   Physical Exam  Vitals and nursing note reviewed. Constitutional:       Appearance: She is well-developed. She is not toxic-appearing. HENT:      Head: Normocephalic. Right Ear: Tympanic membrane, ear canal and external ear normal.      Left Ear: Tympanic membrane, ear canal and external ear normal.      Mouth/Throat:      Pharynx: No oropharyngeal exudate or posterior oropharyngeal erythema. Eyes:      General: No scleral icterus. Extraocular Movements: Extraocular movements intact. Conjunctiva/sclera: Conjunctivae normal.      Pupils: Pupils are equal, round, and reactive to light. Neck:      Thyroid: No thyroid mass or thyromegaly. Vascular: No carotid bruit. Cardiovascular:      Rate and Rhythm: Normal rate and regular rhythm. Heart sounds: Normal heart sounds. No murmur heard. Pulmonary:      Effort: Pulmonary effort is normal.      Breath sounds: Normal breath sounds. Musculoskeletal:      Right lower leg: No edema. Left lower leg: No edema. Lymphadenopathy:      Cervical: No cervical adenopathy. Neurological:      General: No focal deficit present. Mental Status: She is alert and oriented to person, place, and time. Psychiatric:         Mood and Affect: Mood normal.         Behavior: Behavior normal. Behavior is cooperative. ASSESSMENT/PLAN:    Problem List Items Addressed This Visit     Anxiety      Overall improved but is starting to interfere with her sleep recent increased stress levels. I encouraged her to speak to Dr. Joan Burgos at her appointment next week for techniques to help with self relaxation. I have encouraged patient to increase BuSpar to 2.5 mg twice daily with increased stress and see if that helps improve her sleep.   If she is still struggling to sleep after taking an additional dose of BuSpar, she may take an additional dose of amitriptyline periodically. Continue Cymbalta 90 mg daily. Continue to follow with Dr. Macarena oPole for psychology. Relevant Medications    amitriptyline (ELAVIL) 50 MG tablet    busPIRone (BUSPAR) 5 MG tablet    DDD (degenerative disc disease), lumbar      Prednisone taper sent to pharmacy. Patient was also given her flu vaccine today. She will hold off on the prednisone taper until she is 14 days out from her flu vaccine. Relevant Medications    predniSONE (DELTASONE) 10 MG tablet    Essential hypertension      Very well controlled. Patient did have side effects with lisinopril so she is now on olmesartan 10 mg daily with metoprolol 50 mg twice daily. She does have LVH and grade 1 diastolic dysfunction and did establish with Dr. Srini Luna for cardiology. Relevant Orders    CBC Auto Differential    Fibromyalgia     Patient does notice she is more uncomfortable when she is more stressed but does not feel the discomfort from her fibromyalgia is keeping her awake. Does have an appointment with Dr. Pauline Anderson later this month. Remains on Cymbalta and amitriptyline to help with fibromyalgia pain. Relevant Medications    amitriptyline (ELAVIL) 50 MG tablet    predniSONE (DELTASONE) 10 MG tablet    History of 2019 novel coronavirus disease (COVID-19)     Patient had residual symptoms of shortness of breath which is improving. She has completed pulmonary function testing, echocardiogram, stress test, and CT of her chest.         LVH (left ventricular hypertrophy)      Patient did establish with Dr. Srini Luna for cardiology. Continue olmesartan 10 mg daily and Lopressor 50 mg twice daily. Blood pressure is very well controlled.          Migraine with aura and without status migrainosus, not intractable    Relevant Medications    amitriptyline (ELAVIL) 50 MG tablet    Mild hyperlipidemia    Relevant Orders    Comprehensive Metabolic Panel    Lipid Panel    TSH with Reflex

## 2021-09-17 NOTE — ASSESSMENT & PLAN NOTE
Patient did establish with Dr. Ran Jang for CT surgery. She is due for repeat CT scan in September. This is been ordered.

## 2021-09-17 NOTE — ASSESSMENT & PLAN NOTE
Patient did establish with Dr. Bruce Huntley for cardiology. Continue olmesartan 10 mg daily and Lopressor 50 mg twice daily. Blood pressure is very well controlled.

## 2021-09-17 NOTE — ASSESSMENT & PLAN NOTE
Patient does notice she is more uncomfortable when she is more stressed but does not feel the discomfort from her fibromyalgia is keeping her awake. Does have an appointment with Dr. Azra Jones later this month. Remains on Cymbalta and amitriptyline to help with fibromyalgia pain.

## 2021-09-17 NOTE — ASSESSMENT & PLAN NOTE
Very well controlled. Patient did have side effects with lisinopril so she is now on olmesartan 10 mg daily with metoprolol 50 mg twice daily. She does have LVH and grade 1 diastolic dysfunction and did establish with Dr. Yany Pyle for cardiology.

## 2021-09-17 NOTE — ASSESSMENT & PLAN NOTE
Prednisone taper sent to pharmacy. Patient was also given her flu vaccine today. She will hold off on the prednisone taper until she is 14 days out from her flu vaccine.

## 2021-09-17 NOTE — ASSESSMENT & PLAN NOTE
Overall improved but is starting to interfere with her sleep recent increased stress levels. I encouraged her to speak to Dr. Duran Living at her appointment next week for techniques to help with self relaxation. I have encouraged patient to increase BuSpar to 2.5 mg twice daily with increased stress and see if that helps improve her sleep. If she is still struggling to sleep after taking an additional dose of BuSpar, she may take an additional dose of amitriptyline periodically. Continue Cymbalta 90 mg daily. Continue to follow with Dr. Renee Lindsey for psychology.

## 2021-09-20 ENCOUNTER — OFFICE VISIT (OUTPATIENT)
Dept: PSYCHOLOGY | Age: 56
End: 2021-09-20
Payer: COMMERCIAL

## 2021-09-20 ENCOUNTER — TELEPHONE (OUTPATIENT)
Dept: CARDIOTHORACIC SURGERY | Age: 56
End: 2021-09-20

## 2021-09-20 DIAGNOSIS — F33.1 MODERATE EPISODE OF RECURRENT MAJOR DEPRESSIVE DISORDER (HCC): Primary | ICD-10-CM

## 2021-09-20 DIAGNOSIS — Q24.8 PERICARDIAL CYST: Primary | ICD-10-CM

## 2021-09-20 PROCEDURE — 90832 PSYTX W PT 30 MINUTES: CPT | Performed by: PSYCHOLOGIST

## 2021-09-20 NOTE — TELEPHONE ENCOUNTER
Spoke with patient regarding schedule of CT chest without contrast on 10/10/2021 at 2:00 pm with arrival time of 1:30 pm at [facility] for assessment of her pericardial cyst. Patient instructed to avoid metal items such as buttons, snaps and jewelry.

## 2021-09-20 NOTE — PROGRESS NOTES
Behavioral Health Consultation  Jeanine Espinal, Ph.D.  Camila Donnelly  9/20/2021  8:07 AM      Time spent with Patient: 25 minutes  This is patient's second  [facility] appointment. Reason for Consult:    Chief Complaint   Patient presents with    Depression       Feedback given to PCP. S:  Pt seen for f/u of depression. Pt reported stable mood and sxs. Did attempt to sent more financial boundaries w Margot Smith. Noted it went \"okay,\" but hasn't been guero to pay yet. Has been encouraging daughter to seek higher paying job, has three felonies which impact daughter's job attainment. Noted she has been struggling w feeling lonely. Discussed this.      O:  MSE:    Appearance    alert, cooperative  Appetite normal  Sleep disturbance No  Fatigue Yes  Loss of pleasure No  Impulsive behavior No  Speech    spontaneous, normal rate, normal volume and well articulated  Mood    Depressed  Affect    normal affect  Thought Content    intact  Thought Process    linear, goal directed and coherent  Associations    logical connections  Insight    Fair  Judgment    Intact  Orientation    oriented to person, place, time, and general circumstances  Memory    recent and remote memory intact  Attention/Concentration    intact  Morbid ideation No  Suicide Assessment    no suicidal ideation    History:  Social History:   Social History     Socioeconomic History    Marital status:      Spouse name: Not on file    Number of children: Not on file    Years of education: Not on file    Highest education level: Not on file   Occupational History    Not on file   Tobacco Use    Smoking status: Never Smoker    Smokeless tobacco: Never Used   Vaping Use    Vaping Use: Never used   Substance and Sexual Activity    Alcohol use: Yes     Comment: every month or so    Drug use: Yes     Types: Marijuana     Comment: last 2019    Sexual activity: Not on file   Other Topics Concern    Not on file   Social History Narrative    Not on file Social Determinants of Health     Financial Resource Strain:     Difficulty of Paying Living Expenses:    Food Insecurity:     Worried About Running Out of Food in the Last Year:     [address] in the Last Year:    Transportation Needs:     Lack of Transportation (Medical):  Lack of Transportation (Non-Medical):    Physical Activity:     Days of Exercise per Week:     Minutes of Exercise per Session:    Stress:     Feeling of Stress :    Social Connections:     Frequency of Communication with Friends and Family:     Frequency of Social Gatherings with Friends and Family:     Attends Advent Services:     Active Member of Clubs or Organizations:     Attends Club or Organization Meetings:     Marital Status:    Intimate Partner Violence:     Fear of Current or Ex-Partner:     Emotionally Abused:     Physically Abused:     Sexually Abused:      TOBACCO:   reports that she has never smoked. She has never used smokeless tobacco.  ETOH:   reports current alcohol use. Diagnosis:  Major Depressive Disorder, moderate, recurrent    Plan:  Pt interventions:  Practiced assertive communication, Discussed and problem-solved barriers in adhering to behavioral change plan, Supportive techniques and Identified maladaptive thoughts        Documentation was done using voice recognition dragon software. Every effort was made to ensure accuracy; however, inadvertent, unintentional computerized transcription errors may be present.

## 2021-09-26 DIAGNOSIS — G43.109 MIGRAINE WITH AURA AND WITHOUT STATUS MIGRAINOSUS, NOT INTRACTABLE: ICD-10-CM

## 2021-09-26 DIAGNOSIS — F41.9 ANXIETY: ICD-10-CM

## 2021-09-26 DIAGNOSIS — F32.A CHRONIC DEPRESSION: ICD-10-CM

## 2021-09-27 RX ORDER — SUMATRIPTAN 100 MG/1
100 TABLET, FILM COATED ORAL
Qty: 9 TABLET | Refills: 3 | Status: SHIPPED | OUTPATIENT
Start: 2021-09-27

## 2021-09-27 RX ORDER — DULOXETIN HYDROCHLORIDE 30 MG/1
30 CAPSULE, DELAYED RELEASE ORAL DAILY
Qty: 90 CAPSULE | Refills: 3 | Status: SHIPPED | OUTPATIENT
Start: 2021-09-27 | End: 2022-01-17

## 2021-10-10 ENCOUNTER — HOSPITAL ENCOUNTER (OUTPATIENT)
Dept: CT IMAGING | Age: 56
Discharge: HOME | End: 2021-10-10
Payer: COMMERCIAL

## 2021-10-10 ENCOUNTER — PATIENT MESSAGE (OUTPATIENT)
Dept: INTERNAL MEDICINE CLINIC | Age: 56
End: 2021-10-10

## 2021-10-10 DIAGNOSIS — Q24.8 PERICARDIAL CYST: ICD-10-CM

## 2021-10-10 PROCEDURE — 71250 CT THORAX DX C-: CPT

## 2021-10-11 NOTE — TELEPHONE ENCOUNTER
From: Nati Schafer  To: Xander Rain DO  Sent: 10/10/2021 9:25 PM EDT  Subject: Non-Urgent Medical Question    Thank you Dr. Robert Dunn I did read the results and figured I would have to get a biopsy. I hope it's nothing to worry about!    [facility]

## 2021-10-13 NOTE — TELEPHONE ENCOUNTER
Spoke to patient via phone last night. She will call and schedule appointment with Dr. Jeanine Bradley.

## 2021-10-21 NOTE — PROGRESS NOTES
PCP: Pallavi Sevilla DO                Chief Complaint: pericardial cyst  Lior Kaur is a 64 y.o. female who underwent hiatal hernia repair 2021. Developed cough. CT chest 3/5/21 showed no PE, however incidentally noted solid 2.1cm retrosternal nodule. No associated symptoms. Past Medical History:  Past Medical History:   Diagnosis Date    Chronic depression     DDD (degenerative disc disease), lumbar     Fatty liver     MRI 3/2021 mild    Fibromyalgia     Hiatal hernia     HTN (hypertension)     Obesity (BMI 30.0-34. 9)     BMI 32.68     KARTHIK (obstructive sleep apnea)     has mask, doesn't wear it    Pneumonia     in her 35s had pna B 7 yrs in a row    Psoriatic arthritis ([address]        Past Surgical History:  Past Surgical History:   Procedure Laterality Date    BLADDER SUSPENSION  2004     SECTION  08/31/1987    x1    CHOLECYSTECTOMY  2000    ENDOMETRIAL ABLATION  2007    HIATAL HERNIA REPAIR N/A 2021    LAPAROSCOPIC PARAESOPHAGEAL HIATAL HERNIA REPAIR, NISSEN FUNDOPLICATION performed by Shereen Haq MD at [address]  2009    heavy menses    NASAL SEPTUM SURGERY  2005    NERVE SURGERY Bilateral 2019    BILATERAL L4-5 AND L5-S1 LUMBAR FACET INJECTIONS WITH FLUOROSCOPY performed by Anyi Juan MD at [place] [address]         Home Medications:   Prior to Admission medications    Medication Sig Start Date End Date Taking? Authorizing Provider   SUMAtriptan (IMITREX) 100 MG tablet Take 1 tablet by mouth once as needed for Migraine May repeat once after 2 hours if needed. No more than 2 per 24 hour period.  21  Yes Florentino Russell DO   DULoxetine (CYMBALTA) 30 MG extended release capsule Take 1 capsule by mouth daily Take with 60 mg Cymbalta 21 Yes Florentino Russell DO   amitriptyline (ELAVIL) 50 MG tablet Take 1 tablet by mouth nightly May take an additional 50 mg prn insomnia 9/17/21  Yes [employer] [place], DO   busPIRone (BUSPAR) 5 MG tablet Take 1 tablet by mouth 3 times daily as needed (anxiety) 9/17/21  Yes [employer] [place], DO   olmesartan (BENICAR) 20 MG tablet Take 0.5 tablets by mouth daily 7/18/21  Yes Han Hammersmith, DO   sulfaSALAzine (AZULFIDINE) 500 MG tablet Take 2 tablets by mouth 2 times daily 7/1/21  Yes Federica Bocanegra MD   DULoxetine (CYMBALTA) 60 MG extended release capsule Take 1 capsule by mouth daily Take with 30 mg  Patient taking differently: Take 60 mg by mouth nightly  2/10/21  Yes Han Hammersmith, DO   metoprolol tartrate (LOPRESSOR) 50 MG tablet Take 1 tablet by mouth 2 times daily 1/27/21  Yes Han Hammersmith, DO   Cholecalciferol (VITAMIN D3) 2000 units CAPS Take by mouth   Yes Historical Provider, MD      Allergies:     Allergies   Allergen Reactions    Morphine Other (See Comments)     Chest tightness    Norco [Hydrocodone-Acetaminophen] Other (See Comments)     Bad headache      Vital Signs:                                                 /86 (Site: Left Upper Arm, Position: Sitting, Cuff Size: Medium Adult)   Pulse 64   Temp 98.1 °F (36.7 °C) (Temporal)   Ht 5' 5\" (1.651 m)   Wt 198 lb 9.6 oz (90.1 kg)   SpO2 94%   BMI 33.05 kg/m²        Admission Weight: Weight: 198 lb 9.6 oz (90.1 kg)      CV: reg  Pulm: clear    CBC:   Lab Results   Component Value Date    WBC 3.9 05/07/2021    HGB 13.3 05/07/2021    HCT 39.5 05/07/2021    MCV 93.7 05/07/2021     05/07/2021     BMP:   Lab Results   Component Value Date     06/18/2021    K 4.4 06/18/2021     06/18/2021    CO2 27 06/18/2021    PHOS 4.6 06/11/2021    BUN 12 06/18/2021    CREATININE 0.9 06/18/2021    CALCIUM 9.1 06/18/2021     Cardiac Enzymes: No results found for: CKTOTAL, CKMB, CKMBINDEX, TROPONINI  PT/INR: No results found for: PROTIME, INR  APTT: No results found for: APTT  Liver Profile:  Lab Results   Component Value Date    AST 19 05/07/2021 ALT 18 05/07/2021    BILIDIR <0.2 12/11/2020    BILITOT <0.2 12/11/2020    ALKPHOS 71 12/11/2020    LABALBU 4.4 06/11/2021     Lab Results   Component Value Date    CHOL 182 06/11/2021    HDL 52 06/11/2021    TRIG 95 06/11/2021     HgbA1c:  Lab Results   Component Value Date    LABA1C 5.9 06/11/2021     UA:   Lab Results   Component Value Date    COLORU DK YELLOW 08/13/2019    PHUR 7.0 08/13/2019    WBCUA None seen 08/13/2019    RBCUA 0-2 08/13/2019    CLARITYU Clear 08/13/2019    SPECGRAV 1.022 08/13/2019    LEUKOCYTESUR Negative 08/13/2019    UROBILINOGEN 2.0 08/13/2019    BILIRUBINUR Negative 08/13/2019    BLOODU Negative 08/13/2019    GLUCOSEU Negative 08/13/2019    AMORPHOUS 2+ 08/13/2019     CT chest   3/5/21 2.1cm retrosternal nodule;   10/10/21  There is a solid well-defined slightly lobular nodule in the anterior   mediastinum contained within the fat retrosternal immediately anterior to the   ascending aorta.  This is best measured on axial series 2, image 47 measuring   2.2 x 1.9 cm.  Compared to the prior study similar corresponding image this   measured 2.1 x 1.2 cm.  Internal density 45.7 Hounsfield units much greater   than simple fluid.  In fact, density is similar to measurement of the   ascending aortic lumen, 39 Hounsfield units.  Further, sample measurement of   fluid in the pericardial recess posterior to the ascending aorta is 0   Hounsfield units representing baseline water density. PET 3/29/21 no enhancement      Imaging reviewed with pt. Size appears roughly comparable accounting for slice allocation. Reassuring that there was no associated hypermetabolism 3/2021. Options of 1.continued surveillance 2.surgical excision for definitive diagnosis discussed including details of surgery, periop course, risks/poss complications. Pt opts for repeat imaging in 6 months. If unchanged then, would go to annual imaging.     Apoorva Castellon MD  10/22/2021  12:37 PM

## 2021-10-22 ENCOUNTER — OFFICE VISIT (OUTPATIENT)
Dept: CARDIOTHORACIC SURGERY | Age: 56
End: 2021-10-22
Payer: COMMERCIAL

## 2021-10-22 VITALS
SYSTOLIC BLOOD PRESSURE: 130 MMHG | TEMPERATURE: 98.1 F | OXYGEN SATURATION: 94 % | HEIGHT: 65 IN | WEIGHT: 198.6 LBS | DIASTOLIC BLOOD PRESSURE: 86 MMHG | HEART RATE: 64 BPM | BODY MASS INDEX: 33.09 KG/M2

## 2021-10-22 DIAGNOSIS — J98.59 MEDIASTINAL MASS: Primary | ICD-10-CM

## 2021-10-22 PROCEDURE — 99214 OFFICE O/P EST MOD 30 MIN: CPT | Performed by: THORACIC SURGERY (CARDIOTHORACIC VASCULAR SURGERY)

## 2021-10-22 NOTE — LETTER
10/22/21        [facility] ([facility]) Cardiovascular and Thoracic Surgeons  [address] [place] [address]        RE:    Jaz Dietrich,   1965    Dear Dr. Michelet Rowe,    It was my pleasure to see your patient, Jaz Dietrich, in the office today with regard to anterior mediastinal mass. I have attached a copy of the office evaluation. Thank you for allowing me to participate in the care of this patient.       Sincerely,     Akua Yin MD, MD    CC: Stacy Grant DO  [facility] [address]  [facility], [address] [place] [address] [address]  [address] [place] [address] Jonah Chandler

## 2021-11-02 DIAGNOSIS — L40.50 PSORIATIC ARTHRITIS (HCC): ICD-10-CM

## 2021-11-02 DIAGNOSIS — L40.9 PSORIASIS: ICD-10-CM

## 2021-11-02 RX ORDER — SULFASALAZINE 500 MG/1
TABLET ORAL
Qty: 180 TABLET | OUTPATIENT
Start: 2021-11-02

## 2021-11-03 ENCOUNTER — OFFICE VISIT (OUTPATIENT)
Dept: RHEUMATOLOGY | Age: 56
End: 2021-11-03
Payer: COMMERCIAL

## 2021-11-03 VITALS
DIASTOLIC BLOOD PRESSURE: 80 MMHG | OXYGEN SATURATION: 97 % | HEIGHT: 65 IN | WEIGHT: 196 LBS | BODY MASS INDEX: 32.65 KG/M2 | HEART RATE: 73 BPM | SYSTOLIC BLOOD PRESSURE: 112 MMHG

## 2021-11-03 DIAGNOSIS — M79.7 FIBROMYALGIA: ICD-10-CM

## 2021-11-03 DIAGNOSIS — L40.9 PSORIASIS: ICD-10-CM

## 2021-11-03 DIAGNOSIS — Z79.899 HIGH RISK MEDICATION USE: ICD-10-CM

## 2021-11-03 DIAGNOSIS — M35.00 SJOGREN SYNDROME, UNSPECIFIED (HCC): ICD-10-CM

## 2021-11-03 DIAGNOSIS — L40.50 PSORIATIC ARTHRITIS (HCC): Primary | ICD-10-CM

## 2021-11-03 PROCEDURE — 99215 OFFICE O/P EST HI 40 MIN: CPT | Performed by: INTERNAL MEDICINE

## 2021-11-03 RX ORDER — SULFASALAZINE 500 MG/1
1000 TABLET ORAL 2 TIMES DAILY
Qty: 360 TABLET | Refills: 0 | Status: SHIPPED | OUTPATIENT
Start: 2021-11-03 | End: 2022-01-25 | Stop reason: SDUPTHER

## 2021-11-03 RX ORDER — SULFASALAZINE 500 MG/1
1000 TABLET ORAL 2 TIMES DAILY
Qty: 360 TABLET | Refills: 0 | OUTPATIENT
Start: 2021-11-03 | End: 2022-02-01

## 2021-11-03 RX ORDER — PILOCARPINE HYDROCHLORIDE 5 MG/1
5 TABLET, FILM COATED ORAL 3 TIMES DAILY
Qty: 270 TABLET | Refills: 0 | Status: SHIPPED | OUTPATIENT
Start: 2021-11-03 | End: 2022-01-25 | Stop reason: SDUPTHER

## 2021-11-03 RX ORDER — FOLIC ACID 1 MG/1
1 TABLET ORAL DAILY
Qty: 90 TABLET | Refills: 0 | Status: SHIPPED | OUTPATIENT
Start: 2021-11-03 | End: 2022-01-25 | Stop reason: SDUPTHER

## 2021-11-03 NOTE — PATIENT INSTRUCTIONS
METHOTREXATE DOSING    1. Start taking 4 tablets of Methotrexate once a week for the first 2 weeks. If well-tolerated, then start taking 5 tablets all at once once a week. 2. Check laboratory studies in 4 weeks after starting the Methotrexate  3. Take Folic Acid 1 mg daily     Hold Methotrexate if you are on antibiotics. Restart once and have recovered from the infection. No alcohol while on Methotrexate! PLEASE CALL WITH ANY QUESTIONS OR CONCERNS [phone]    Methotrexate (oral)     Pronunciation: meth oh TREX ate   Brand: Rheumatrex Dose Pack, Trexall   What is methotrexate? Methotrexate interferes with the growth of certain cells of the body, especially cells that reproduce quickly, such as cancer cells, bone marrow cells, and skin cells. Methotrexate is used to treat certain types of cancer of the breast, skin, head and neck, or lung. Methotrexate is also used to treat severe psoriasis and rheumatoid arthritis. Methotrexate is usually given after other medications have been tried without successful treatment of symptoms. Methotrexate may also be used for purposes not listed in this medication guide. What should I discuss with my healthcare provider before taking methotrexate? You should not use this medicine if you are allergic to methotrexate. Do not use methotrexate to treat psoriasis or rheumatoid arthritis if you have:    alcoholism, cirrhosis, or other liver disease;    a blood cell disorder such as anemia (lack of red blood cells) or leukopenia (lack of white blood cells);    a bone marrow disorder; or    if you are breast-feeding a baby. Methotrexate is sometimes used to treat cancer even when patients do have one of the conditions listed above. Your doctor will decide if this treatment is right for you.    To make sure methotrexate is safe for you, tell your doctor if you have:    kidney disease;    a folate deficiency;    pneumonia or lung disease;    stomach ulcers;  any type of infection; or    if you are receiving radiation treatments. Methotrexate can cause birth defects in an unborn baby. Do not use methotrexate to treat psoriasis or rheumatoid arthritis if you are pregnant. Tell your doctor right away if you become pregnant during treatment. You may need to have a negative pregnancy test before starting this treatment. Use birth control to prevent pregnancy while you are using methotrexate, whether you are a man or a woman. Methotrexate use by either parent may cause birth defects. If you are a man, use a condom to keep from causing a pregnancy while you are using methotrexate. Continue using condoms for at least 90 days after your treatment ends. If you are a woman, use an effective form of birth control while you are taking methotrexate, and for at least one cycle of ovulation after your treatment ends. Do not give this medicine to a child without the advice of a doctor. How should I take methotrexate? Follow all directions on your prescription label. Do not take this medicine in larger or smaller amounts or for longer than recommended. You must use the correct dose of methotrexate for your condition. Methotrexate is sometimes taken once or twice per week and not every day. Follow the directions on your prescription label. Some people have  after taking methotrexate every day by accident. Ask your doctor or pharmacist if you have questions about your dose of methotrexate or how often to take it. Use methotrexate regularly to get the most benefit. Get your prescription refilled before you run out of medicine completely. Methotrexate can lower blood cells that help your body fight infections and help your blood to clot. Your blood will need to be tested often, and you may need an occasional liver biopsy. Your cancer treatments may be delayed based on the results of these tests. Store at room temperature away from moisture and heat.    What happens if I miss a dose? Call your doctor for instructions if you miss a dose of methotrexate. What happens if I overdose? Seek emergency medical attention or call the Poison Help line at [phone]. An overdose of methotrexate can be fatal.   What should I avoid while taking methotrexate? This medicine can pass into body fluids (including urine, feces, vomit, semen, vaginal fluid). Patients and caregivers should wear rubber gloves while cleaning up body fluids, handling contaminated trash or laundry or changing diapers. Wash hands before and after removing gloves. Wash soiled clothing and linens separately from other laundry. Body fluids should not be handled by a woman who is pregnant or who may become pregnant. Use condoms during sexual activity to avoid exposure to body fluids. Avoid exposure to sunlight or artificial UV rays (sunlamps or tanning beds), especially if you are being treated for psoriasis. Methotrexate can make your skin more sensitive to sunlight and your psoriasis may worsen. Avoid drinking alcohol while taking methotrexate. What are the possible side effects of methotrexate? Get emergency medical help if you have signs of an allergic reaction: hives; difficulty breathing; swelling of your face, lips, tongue, or throat.    Stop using methotrexate and call your doctor at once if you have:    dry cough, shortness of breath;    diarrhea, vomiting, white patches or sores inside your mouth or on your lips;    blood in your urine or stools;    swelling, rapid weight gain, little or no urinating;    seizure (convulsions);    fever, chills, body aches, flu symptoms;    pale skin, easy bruising, unusual bleeding, weakness, feeling light-headed or short of breath;    liver problems --nausea, upper stomach pain, itching, tired feeling, loss of appetite, dark urine, guanaco-colored stools, jaundice (yellowing of the skin or eyes); or    severe skin reaction --fever, sore throat, swelling in your face or tongue, burning in your eyes, skin pain, followed by a red or purple skin rash that spreads (especially in the face or upper body) and causes blistering and peeling. Older adults may be more likely to have side effects from this medicine. Common side effects may include:    vomiting, upset stomach;    headache, dizziness, tired feeling; or    blurred vision. This is not a complete list of side effects and others may occur. Call your doctor for medical advice about side effects. You may report side effects to FDA at 5-149-FDA-3332. What other drugs will affect methotrexate? Many drugs can interact with methotrexate. Not all possible interactions are listed here. Tell your doctor about all your medications and any you start or stop using during treatment with methotrexate, especially:    azathioprine;    leucovorin;    phenytoin;    probenecid;    theophylline;    an antibiotic or sulfa drugs;    isotretinoin, retinol, tretinoin;    NSAIDs (non-steroidal anti-inflammatory drugs) --ibuprofen (Advil, Motrin), naproxen (Aleve), celecoxib, diclofenac, indomethacin, meloxicam, and others; or    salicylates --aspirin, Nuprin Backache Caplet, Kaopectate, KneeRelief, Pamprin Cramp Formula, Pepto-Bismol, Tricosal, Trilisate, and others. This list is not complete and many other drugs can interact with methotrexate. This includes prescription and over-the-counter medicines, vitamins, and herbal products. Give a list of all your medicines to any healthcare provider who treats you. Where can I get more information? Your pharmacist can provide more information about methotrexate. Remember, keep this and all other medicines out of the reach of children, never share your medicines with others, and use this medication only for the indication prescribed.    Every effort has been made to ensure that the information provided by [address] is accurate, up-to-date, and complete, but no guarantee is made to that effect. Drug information contained herein may be time sensitive. [employer] information has been compiled for use by healthcare practitioners and consumers in the [place] and therefore [employer] does not warrant that uses outside of the [place] are appropriate, unless specifically indicated otherwise. [facility]'s drug information does not endorse drugs, diagnose patients or recommend therapy. [facility] drug information is an informational resource designed to assist licensed healthcare practitioners in caring for their patients and/or to serve consumers viewing this service as a supplement to, and not a substitute for, the expertise, skill, knowledge and judgment of healthcare practitioners. The absence of a warning for a given drug or drug combination in no way should be construed to indicate that the drug or drug combination is safe, effective or appropriate for any given patient. [facility] does not assume any responsibility for any aspect of healthcare administered with the aid of information [facility] provides. The information contained herein is not intended to cover all possible uses, directions, precautions, warnings, drug interactions, allergic reactions, or adverse effects. If you have questions about the drugs you are taking, check with your doctor, nurse or pharmacist.   Copyright 3974-8432 [address]. Version: 11.03. Revision date: 1/6/2015. This information does not replace the advice of a doctor. [employer] disclaims any warranty or liability for your use of this information.    Content Version: 84.9.090440

## 2021-11-03 NOTE — PROGRESS NOTES
Matthew Avalos MD  [facility]) Physicians - Rheumatology    [x] [facility] HOSPITAL:  [address] [facility] [] [facility] Office:  [address], [address]   Office: [phone]  Fax: [phone] PROGRESS NOTE    SUBJECTIVE:    Background:   Clarence Angulo is a 64 y.o. female w/ prior Dx of long standing PsO, PsA, secondary Sjogren syndrome and fibromyalgia previously followed by rheumatologist (Dr. Mickey Mccabe) in [facility].   PMHx pertinent for chronic back pain from DDD, spondylosis and facet arthropathy of L-spine (follows w/ Dr. Santiago Bowen), KARTHIK noncompliant w/ CPAP, HTN, pre-diabetes, fatty liver, IBS, depression and anxiety.     Per prior rheumatologist, Dr. Chiquita Kerr note from 1/19/17: Dx of PsA on Humira \"was doing well initially on Humira, still flares, no synovitis\", Humira was switched to Stelara in 3/17.  PsA was noted to improve on Stelara per rheumatologist note from 6/29/17.     Reviewed  Dermatology note from 9/17/18, Dx of PsO w/ PsA \"previously on many biologics per Rheumatology\", exam at the time revealed \"palms w/ few pink thin scaling plaques\".      Current rheum meds:  SSZ at 1000 mg BID: started in 3/2020  Cymbalta 90 mg daily: prescribed by PCP  Elavil 30 mg QHS: prescribed by PCP  OTC Biotene products, artificial tears     Prior rheum meds:  Meloxicam provided short term pain relief but worsened her GERD, she thinks she took Celecoxib for a couple of yrs in her 30s  Celecoxib 100 mg BID: caused GERD  Medrol dose pack: significantly improves joint pain  MTX: per pt oral MTX caused thrush, she thinks this \"worked a little bit\"  Humira: per pt this was [employer]" but it stopped working after a few months  Stelara: per pt this worked mainly for her joints - she felt this worked but stopped after two months d/t insurance issues   Flexeril: ineffective for back pain  Gabapentin: does not remember response  Paxil, Effexor    Past medical/surgical history, medications and allergies are reviewed and updated as appropriate. Interval Hx:   Pt reports improvement in her joint symptoms after she increased her SSC dose at her last visit however she developed an arthritis approximately 2 weeks ago. She reports swelling in her fingers \"like sausages\" after waking up in the morning. Morning stiffness lasts an hour. She has a couple dry patches on her eyelids. No other psoriatic lesions. She reports significant dryness in her mouth and her eyes. She reports difficulty w/ talking d/t severe dry mouth. She reports incomplete relief from OTC Biotene products and artificial tears. Rheumatologic ROS:  Constitutional: denies chronic fatigue, fever/chills, night sweats, unintentional weight loss  Integumentary: +chronic diffuse hair thinning, denies photosensitivity, rash, or Sx of Raynaud's phenomenon  Eyes: +dry eyes lately she feels \"they're irritating\" has not had any recent eye exam, denies redness or pain, visual disturbance, or floaters  Nose: denies nasal ulcers or recurrent sinusitis  Oral cavity: +dry mouth, denies oral ulcers  Cardiovascular: denies CP, palpitations, Hx of pericardial effusion or pericarditis  Respiratory: denies SOB, cough, hemoptysis, or pleurisy  Gastrointestinal: +chronic GERD refer to above HPI, denies chronic diarrhea or bloody stools  Musculoskeletal:  refer to above HPI     Allergies   Allergen Reactions    Morphine Other (See Comments)     Chest tightness    Norco [Hydrocodone-Acetaminophen] Other (See Comments)     Bad headache       Past Medical History:        Diagnosis Date    Chronic depression     DDD (degenerative disc disease), lumbar     Fatty liver     MRI 3/2021 mild    Fibromyalgia     Hiatal hernia 2011    HTN (hypertension)     Obesity (BMI 30.0-34. 9)     BMI 32.68 2021    KARTHIK (obstructive sleep apnea)     has mask, doesn't wear it    Pneumonia     in her 35s had pna B 7 yrs in a row    Psoriatic arthritis [facility])        Past Surgical History:        Procedure Laterality Date    BLADDER SUSPENSION  2004     SECTION  08/31/1987    x1    CHOLECYSTECTOMY  2000    ENDOMETRIAL ABLATION      HIATAL HERNIA REPAIR N/A 2021    LAPAROSCOPIC PARAESOPHAGEAL HIATAL HERNIA REPAIR, NISSEN FUNDOPLICATION performed by Bernadette Lombardo MD at [facility], TOTAL ABDOMINAL  2009    heavy menses    NASAL SEPTUM SURGERY  2005    NERVE SURGERY Bilateral 2019    BILATERAL L4-5 AND L5-S1 LUMBAR FACET INJECTIONS WITH FLUOROSCOPY performed by Ace Moody MD at [place] [address]       Medications:    Current Outpatient Medications   Medication Sig Dispense Refill    sulfaSALAzine (AZULFIDINE) 500 MG tablet Take 2 tablets by mouth 2 times daily 360 tablet 0    methotrexate (RHEUMATREX) 2.5 MG chemo tablet Take 5 tablets by mouth once a week 20 tablet 1    folic acid (FOLVITE) 1 MG tablet Take 1 tablet by mouth daily 90 tablet 0    pilocarpine (SALAGEN) 5 MG tablet Take 1 tablet by mouth 3 times daily 270 tablet 0    SUMAtriptan (IMITREX) 100 MG tablet Take 1 tablet by mouth once as needed for Migraine May repeat once after 2 hours if needed. No more than 2 per 24 hour period.  9 tablet 3    DULoxetine (CYMBALTA) 30 MG extended release capsule Take 1 capsule by mouth daily Take with 60 mg Cymbalta 90 capsule 3    amitriptyline (ELAVIL) 50 MG tablet Take 1 tablet by mouth nightly May take an additional 50 mg prn insomnia 180 tablet 1    busPIRone (BUSPAR) 5 MG tablet Take 1 tablet by mouth 3 times daily as needed (anxiety) 90 tablet 3    olmesartan (BENICAR) 20 MG tablet Take 0.5 tablets by mouth daily 30 tablet 3    DULoxetine (CYMBALTA) 60 MG extended release capsule Take 1 capsule by mouth daily Take with 30 mg (Patient taking differently: Take 60 mg by mouth nightly ) 90 capsule 3    metoprolol tartrate (LOPRESSOR) 50 MG tablet Take 1 tablet by mouth 2 times daily 180 tablet 3    Cholecalciferol (VITAMIN D3) 2000 units CAPS Take by mouth       No current facility-administered medications for this visit. OBJECTIVE:  Physical Exam:  /80   Pulse 73   Ht 5' 5\" (1.651 m)   Wt 196 lb (88.9 kg)   SpO2 97%   BMI 32.62 kg/m²     GEN: AAOx3, in NAD, well-appearing  HEAD: normocephalic, atraumatic  EYES: no injection or icterus  CVS: RRR  LUNGS: in no acute respiratory distress, CTAB  Upper extremities:              Hands: L MCP joints slightly full TTP, b/l PIP and DIP joints boggy and TTP, full fist formation w/ good  strength              Wrist: no synovitis in the wrist joints b/l, FROM              Elbow: no synovitis or bursitis, b/l lateral epicondyles slightly TTP, FROM  Lower extremities:              Knees: no warmth or effusion present, FROM              Ankles: no synovitis, FROM, Achilles tendons w/o swelling or warmth NTTP              Feet: no toe swelling or pain or warmth on palpation w/ FROM, negative MTP squeeze test b/l  INTEGUMENT: no active psoriatic lesions, no nail pitting although she has her toenails painted, no patchy alopecia, no other rash, petechiae, bruises, or palpable purpura, no clubbing or digital ulcers    DATA:  Labs:   I personally reviewed interval labs and discussed w/ the pt in detail which showed:    Lab Results   Component Value Date    WBC 3.9 (L) 05/07/2021    HGB 13.3 05/07/2021    HCT 39.5 05/07/2021    MCV 93.7 05/07/2021     05/07/2021    LYMPHOPCT 36.0 05/07/2021    RBC 4.22 05/07/2021    MCH 31.5 05/07/2021    MCHC 33.7 05/07/2021    RDW 15.2 05/07/2021     Lab Results   Component Value Date     06/18/2021    K 4.4 06/18/2021     06/18/2021    CO2 27 06/18/2021    BUN 12 06/18/2021    CREATININE 0.9 06/18/2021    GLUCOSE 125 (H) 06/18/2021    CALCIUM 9.1 06/18/2021    PROT 5.8 (L) 12/11/2020    LABALBU 4.4 06/11/2021    BILITOT <0.2 12/11/2020    ALKPHOS 71 12/11/2020    AST 19 05/07/2021    ALT 18 05/07/2021    LABGLOM >60 06/18/2021    GFRAA >60 06/18/2021    AGRATIO 2.6 (H) 09/25/2020    GLOB 1.7 09/25/2020     Lab Results   Component Value Date    VITD25 46.5 09/25/2020     Lab Results   Component Value Date    C3 141.3 07/24/2019     Lab Results   Component Value Date    C4 23.3 07/24/2019     No results found for: ANTIDSDNAIGG     Lab Results   Component Value Date    CRP <0.3 03/04/2020    CRP 1.3 07/24/2019     Lab Results   Component Value Date    SEDRATE 10 03/04/2020    SEDRATE 7 07/24/2019     No results found for: LABURIC    Negative RF, CCP (7/24/19)  Negative HLA B27 (7/24/19)  Negative JULIANO, SSA, SSB, C3 and C4 wnl (7/24/19)  Negative hepatitis B and C serologies, Quantiferon TB, HIV screen    Imaging:  I personally reviewed interval imaging and discussed w/ the pt in detail which included:    MRI L-spine (7/10/19): Mild neural foraminal narrowing.  No significant spinal canal stenosis.       X-rays (7/24/19):  R hand:  No fracture or dislocation. No underlying degenerative changes. Joint spaces are normal with no significant osteophytes. No inflammatory changes. No erosions. No soft tissue swelling.      L hand:  No fracture or dislocation. No underlying degenerative changes. Joint spaces are normal with no significant osteophytes.  No inflammatory changes. No erosions. No soft tissue swelling.      R knee:  No fracture or dislocation. No underlying degenerative changes. Joint spaces are normal with no significant osteophytes.  No inflammatory changes. No erosions. No soft tissue swelling.      L knee:  No fracture or dislocation. No underlying degenerative changes. Joint spaces are normal with no significant osteophytes. No inflammatory changes. No erosions. No soft tissue swelling.      SI joints:  Normal, symmetric appearance of the b/l SI joints.  No widening or significant sclerosis.  No erosions are appreciated.  Pelvic bones are otherwise unremarkable.  No significant soft tissue findings.      I independently reviewed above X-rays - mild OA changes in the b/l PIP joints otherwise well preserved joint spaces, no significant juxta-articular osteopenia, no erosive changes seen. Gwenetta Mayotte w/ mild medial joint space narrowing, no chondrocalcinosis.  SI joints patent w/o erosive changes, some sclerosis.     MRI R hand (9/3/19)  No erosive changes or synovitis.      MRI pelvis (9/3/19): No evidence of active sacroiliitis. Above results were discussed w/ the pt in detail during today's visit. ASSESSMENT/PLAN:   1. Psoriatic arthritis ([facility] 75.)  Assessment & Plan:  - skin remains mostly clear on SSZ. Add MTX for inflammatory arthritis as above #1. Orders:  -     sulfaSALAzine (AZULFIDINE) 500 MG tablet; Take 2 tablets by mouth 2 times daily, Disp-360 tablet, R-0Normal  -     C-Reactive Protein; Standing  -     methotrexate (RHEUMATREX) 2.5 MG chemo tablet; Take 5 tablets by mouth once a week, Disp-20 tablet, [id number]  -     folic acid (FOLVITE) 1 MG tablet; Take 1 tablet by mouth daily, Disp-90 tablet, R-0Normal  2. Psoriasis  Assessment & Plan:  - skin remains mostly clear on SSZ. Add MTX for inflammatory arthritis as above #1. Orders:  -     sulfaSALAzine (AZULFIDINE) 500 MG tablet; Take 2 tablets by mouth 2 times daily, Disp-360 tablet, R-0Normal  -     C-Reactive Protein; Standing  3. [facility], [facility]  Assessment & Plan:  - chronic sicca, pt stated her ophthalmologist confirmed her eye dryness. Discussed her secondary Sjogren syndrome. - partial relief from OTC artificial tears and Biotene products. - she will try a secretagogue. Orders:  -     pilocarpine (SALAGEN) 5 MG tablet; Take 1 tablet by mouth 3 times daily, Disp-270 tablet, R-0Normal  4. Fibromyalgia  Assessment & Plan:  - well controlled on Duloxetine and Elavil. - encouraged regular physical exercise. 5. High risk medication use  Assessment & Plan:  - she is past due for safety labs for SSZ now.  She will repeat labs 4 wks after starting MTX.   - d/w pt that MTX increases the risk of infections, birth defects, liver toxicity, rare lung problems, probable malignancy and bone marrow toxicity. Discussed need to check safety labs 4 weeks after starting MTX followed by labs every 3 months once we reach a steady dose. Strongly recommended alcohol abstinence. - discussed her immunocompromised state, pt stated that she has received the COVID-19 vaccine and the annual influenza vaccine. Recommended the booster vaccine for COVID-19, instructed pt to hold her MTX for 1 to 2 weeks after receiving the booster vaccine. Orders:  -     Hepatic Function Panel; Standing  -     CBC Auto Differential; Standing  -     Creatinine, Serum; Standing    Return in about 2 months (around 1/3/2022) for lab result discussion and treatment plan, medication monitoring. The risks and benefits of my recommendations, as well as other treatment options, benefits and side effects were discussed with the patient today. Questions were answered. NOTE: This report is transcribed by using voice recognition software dragon. Every effort is made to ensure accuracy; however, inadvertent computerized  transcription errors may be present.

## 2021-11-03 NOTE — ASSESSMENT & PLAN NOTE
- she is past due for safety labs for SSZ now. She will repeat labs 4 wks after starting MTX.   - d/w pt that MTX increases the risk of infections, birth defects, liver toxicity, rare lung problems, probable malignancy and bone marrow toxicity. Discussed need to check safety labs 4 weeks after starting MTX followed by labs every 3 months once we reach a steady dose. Strongly recommended alcohol abstinence. - discussed her immunocompromised state, pt stated that she has received the COVID-19 vaccine and the annual influenza vaccine. Recommended the booster vaccine for COVID-19, instructed pt to hold her MTX for 1 to 2 weeks after receiving the booster vaccine.

## 2021-11-05 ENCOUNTER — OFFICE VISIT (OUTPATIENT)
Dept: INTERNAL MEDICINE CLINIC | Age: 56
End: 2021-11-05
Payer: COMMERCIAL

## 2021-11-05 ENCOUNTER — TELEPHONE (OUTPATIENT)
Dept: INTERNAL MEDICINE CLINIC | Age: 56
End: 2021-11-05

## 2021-11-05 VITALS
BODY MASS INDEX: 32.45 KG/M2 | OXYGEN SATURATION: 95 % | HEART RATE: 68 BPM | SYSTOLIC BLOOD PRESSURE: 120 MMHG | WEIGHT: 195 LBS | DIASTOLIC BLOOD PRESSURE: 61 MMHG

## 2021-11-05 DIAGNOSIS — J45.20 MILD INTERMITTENT ASTHMA WITHOUT COMPLICATION: ICD-10-CM

## 2021-11-05 DIAGNOSIS — L40.9 PSORIASIS: ICD-10-CM

## 2021-11-05 PROCEDURE — 99213 OFFICE O/P EST LOW 20 MIN: CPT | Performed by: NURSE PRACTITIONER

## 2021-11-05 RX ORDER — ALBUTEROL SULFATE 90 UG/1
2 AEROSOL, METERED RESPIRATORY (INHALATION) EVERY 4 HOURS PRN
Qty: 18 G | Refills: 3 | Status: SHIPPED | OUTPATIENT
Start: 2021-11-05

## 2021-11-05 NOTE — PROGRESS NOTES
SUBJECTIVE:    Patient ID: Jaz Dietrich is a 64 y.o. female. CC: Psoriasis    HPI: The patient presents to the office today with concern about psoriasis. She has a history of psoriatic arthritis and is currently under treatment with rheumatology. She also has a history of psoriasis but has not had a flare recently. Earlier this week, she noticed a red patch on her right upper eyelid. This seems to be worsening over the week and currently feels similar to past episodes of psoriasis. There is no pain. She denies any warmth or drainage. No eye symptoms. No treatments at home. Past Medical History:   Diagnosis Date    Chronic depression     DDD (degenerative disc disease), lumbar     Fatty liver     MRI 3/2021 mild    Fibromyalgia     Hiatal hernia 2011    HTN (hypertension)     Obesity (BMI 30.0-34. 9)     BMI 32.68 2021    KARTHIK (obstructive sleep apnea)     has mask, doesn't wear it    Pneumonia     in her 35s had pna B 7 yrs in a row    Psoriatic arthritis (HCC)         Current Outpatient Medications   Medication Sig Dispense Refill    sulfaSALAzine (AZULFIDINE) 500 MG tablet Take 2 tablets by mouth 2 times daily 360 tablet 0    methotrexate (RHEUMATREX) 2.5 MG chemo tablet Take 5 tablets by mouth once a week 20 tablet 1    folic acid (FOLVITE) 1 MG tablet Take 1 tablet by mouth daily 90 tablet 0    pilocarpine (SALAGEN) 5 MG tablet Take 1 tablet by mouth 3 times daily 270 tablet 0    SUMAtriptan (IMITREX) 100 MG tablet Take 1 tablet by mouth once as needed for Migraine May repeat once after 2 hours if needed. No more than 2 per 24 hour period.  9 tablet 3    DULoxetine (CYMBALTA) 30 MG extended release capsule Take 1 capsule by mouth daily Take with 60 mg Cymbalta 90 capsule 3    amitriptyline (ELAVIL) 50 MG tablet Take 1 tablet by mouth nightly May take an additional 50 mg prn insomnia 180 tablet 1    busPIRone (BUSPAR) 5 MG tablet Take 1 tablet by mouth 3 times daily as needed (anxiety) 90 tablet 3    olmesartan (BENICAR) 20 MG tablet Take 0.5 tablets by mouth daily 30 tablet 3    DULoxetine (CYMBALTA) 60 MG extended release capsule Take 1 capsule by mouth daily Take with 30 mg (Patient taking differently: Take 60 mg by mouth nightly ) 90 capsule 3    metoprolol tartrate (LOPRESSOR) 50 MG tablet Take 1 tablet by mouth 2 times daily 180 tablet 3    Cholecalciferol (VITAMIN D3) 2000 units CAPS Take by mouth       No current facility-administered medications for this visit. Review of Systems   Skin: Positive for rash. All other systems reviewed and are negative. OBJECTIVE:  Physical Exam  Constitutional:       Appearance: Normal appearance. HENT:      Head: Normocephalic and atraumatic. Eyes:      General:         Right eye: No foreign body, discharge or hordeolum. Extraocular Movements:      Right eye: Normal extraocular motion. Conjunctiva/sclera:      Right eye: Right conjunctiva is not injected. No exudate or hemorrhage. Skin:     General: Skin is warm and dry. Findings: Rash present. Comments: On the right upper eyelid, there is irregularly shaped, red, nonraised patch consistent with her history of psoriasis. There are no vesicles or other areas of rash, tenderness to suggest zoster. Neurological:      General: No focal deficit present. Mental Status: She is alert and oriented to person, place, and time. Psychiatric:         Mood and Affect: Mood normal.         Behavior: Behavior normal.        /61 (Site: Left Upper Arm)   Pulse 68   Wt 195 lb (88.5 kg)   SpO2 95%   BMI 32.45 kg/m²      PHQ Scores 7/12/2021 6/12/2019   PHQ2 Score 2 1   PHQ9 Score 4 1     Interpretation of Total Score Depression Severity: 1-4 = Minimal depression, 5-9 = Mild depression, 10-14 = Moderate depression, 15-19 = Moderately severe depression, 20-27 =Severe depression      ASSESSMENT/PLAN:  Savita Agosto was seen today for rash.   Diagnoses and all orders for this visit:    Psoriasis  - Rash patch on right upper eyelid consistent with her history of psoriasis. Does not appear consistent with zoster.  - Given the sensitive skin of the eyelid, we discussed low potency topical steroid use. - Recommended skin emollient as well. - Protect skin from sunburn when using topical steroids. -     hydrocortisone 2.5 % cream; Apply topically 2 times daily as needed to psoriasis rash    Mild intermittent asthma without complication  -     albuterol sulfate HFA (PROVENTIL HFA) 108 (90 Base) MCG/ACT inhaler;  Inhale 2 puffs into the lungs every 4 hours as needed for Wheezing or Shortness of Breath        Teresa Vega, APRDAVID - CNP

## 2021-11-05 NOTE — TELEPHONE ENCOUNTER
Informed pt. She is seeing Miranda Bermudez in 20 minutes for the Psoriasis. Forwarded copy of message as MATT.

## 2021-11-05 NOTE — TELEPHONE ENCOUNTER
Patient states she had appointment with Dr Delisa Dominguez 11/3. She states Dr Delisa Dominguez added methotrexate (RHEUMATREX) 2.5 MG chemo tablet to her medications. She states Dr Delisa Dominguez advised her to get [facility] booster then wait 2 weeks to start methotrexate. She has not been able to find anyplace that has the [facility] booster in stock. Patient states she has a patch of psoriasis on eyelid and it is getting worse. She wanted to ask if she should still wait to start methotrexate. Advised patient Dr Delisa Dominguez is not in the office today. She has made appointment with PCP for this afternoon.

## 2021-11-05 NOTE — TELEPHONE ENCOUNTER
Patient seen. Exam and history consistent with mild psoriasis patch.   Rash on eyelid so rx low potency steroid cream

## 2021-12-22 ENCOUNTER — OFFICE VISIT (OUTPATIENT)
Dept: INTERNAL MEDICINE CLINIC | Age: 56
End: 2021-12-22
Payer: COMMERCIAL

## 2021-12-22 VITALS
HEART RATE: 75 BPM | WEIGHT: 196.2 LBS | DIASTOLIC BLOOD PRESSURE: 80 MMHG | SYSTOLIC BLOOD PRESSURE: 120 MMHG | OXYGEN SATURATION: 97 % | BODY MASS INDEX: 32.65 KG/M2

## 2021-12-22 DIAGNOSIS — N30.00 ACUTE CYSTITIS WITHOUT HEMATURIA: Primary | ICD-10-CM

## 2021-12-22 DIAGNOSIS — L40.50 PSORIATIC ARTHRITIS (HCC): ICD-10-CM

## 2021-12-22 LAB
BILIRUBIN URINE: ABNORMAL
BILIRUBIN, POC: ABNORMAL
BLOOD URINE, POC: ABNORMAL
BLOOD, URINE: NEGATIVE
CLARITY, POC: CLEAR
CLARITY: CLEAR
COLOR, POC: ABNORMAL
COLOR: YELLOW
EPITHELIAL CELLS, UA: 1 /HPF (ref 0–5)
GLUCOSE URINE, POC: ABNORMAL
GLUCOSE URINE: NEGATIVE MG/DL
HYALINE CASTS: 1 /LPF (ref 0–8)
KETONES, POC: ABNORMAL
KETONES, URINE: NEGATIVE MG/DL
LEUKOCYTE EST, POC: ABNORMAL
LEUKOCYTE ESTERASE, URINE: NEGATIVE
MICROSCOPIC EXAMINATION: ABNORMAL
NITRITE, POC: ABNORMAL
NITRITE, URINE: NEGATIVE
PH UA: 6.5 (ref 5–8)
PH, POC: 6.5
PROTEIN UA: NEGATIVE MG/DL
PROTEIN, POC: ABNORMAL
RBC UA: 4 /HPF (ref 0–4)
SPECIFIC GRAVITY UA: 1.02 (ref 1–1.03)
SPECIFIC GRAVITY, POC: 1.02
URINE TYPE: ABNORMAL
UROBILINOGEN, POC: 0.2
UROBILINOGEN, URINE: 0.2 E.U./DL
WBC UA: 2 /HPF (ref 0–5)

## 2021-12-22 PROCEDURE — 81002 URINALYSIS NONAUTO W/O SCOPE: CPT | Performed by: INTERNAL MEDICINE

## 2021-12-22 PROCEDURE — 99213 OFFICE O/P EST LOW 20 MIN: CPT | Performed by: INTERNAL MEDICINE

## 2021-12-22 RX ORDER — PHENAZOPYRIDINE HYDROCHLORIDE 200 MG/1
200 TABLET, FILM COATED ORAL 3 TIMES DAILY PRN
Qty: 30 TABLET | Refills: 0 | Status: SHIPPED | OUTPATIENT
Start: 2021-12-22 | End: 2021-12-25

## 2021-12-22 RX ORDER — SULFAMETHOXAZOLE AND TRIMETHOPRIM 800; 160 MG/1; MG/1
1 TABLET ORAL 2 TIMES DAILY
Qty: 14 TABLET | Refills: 0 | Status: SHIPPED | OUTPATIENT
Start: 2021-12-22 | End: 2021-12-29

## 2021-12-22 SDOH — ECONOMIC STABILITY: FOOD INSECURITY: WITHIN THE PAST 12 MONTHS, THE FOOD YOU BOUGHT JUST DIDN'T LAST AND YOU DIDN'T HAVE MONEY TO GET MORE.: NEVER TRUE

## 2021-12-22 SDOH — ECONOMIC STABILITY: FOOD INSECURITY: WITHIN THE PAST 12 MONTHS, YOU WORRIED THAT YOUR FOOD WOULD RUN OUT BEFORE YOU GOT MONEY TO BUY MORE.: NEVER TRUE

## 2021-12-22 ASSESSMENT — SOCIAL DETERMINANTS OF HEALTH (SDOH): HOW HARD IS IT FOR YOU TO PAY FOR THE VERY BASICS LIKE FOOD, HOUSING, MEDICAL CARE, AND HEATING?: NOT HARD AT ALL

## 2021-12-22 ASSESSMENT — ENCOUNTER SYMPTOMS
VOMITING: 0
NAUSEA: 1

## 2021-12-22 NOTE — PROGRESS NOTES
Adama Taylor (:  1965) is a 64 y.o. female,New patient, here for evaluation of the following chief complaint(s):  Urinary Tract Infection (left lower abdomen pain and lower back, and frequency.)         ASSESSMENT/PLAN:  1. Acute cystitis without hematuria  -     Urinalysis with Microscopic; Future  -     Culture, Urine; Future  -     POCT Urinalysis no Micro  -     sulfamethoxazole-trimethoprim (BACTRIM DS;SEPTRA DS) 800-160 MG per tablet; Take 1 tablet by mouth 2 times daily for 7 days, Disp-14 tablet, R-0Normal  -     phenazopyridine (PYRIDIUM) 200 MG tablet; Take 1 tablet by mouth 3 times daily as needed for Pain, Disp-30 tablet, R-0Normal  -     Urinalysis with Microscopic; Future    Patient symptoms are very suggestive of an acute urinary tract infection her urine dipstick is nonyielding but given the clinical picture we will get a go ahead and start her on antibiotics patient urine will be sent for formal evaluation and a culture patient is encouraged to drink more fluids and to stay well-hydrated 2 weeks after her antibiotics are done she is advised to do her urinalysis again to make sure that is completely clear if the patient symptoms do not improve or if the urine analysis does not clear up she will need a urologic follow-up including a CAT scan and a cystoscopy  Return if symptoms worsen or fail to improve, for As scheduled.          Subjective   SUBJECTIVE/OBJECTIVE:    Lab Review   Lab Results   Component Value Date     2021     2021     2021    K 4.4 2021    K 5.2 2021    K 4.5 2021    CO2 27 2021    CO2 26 2021    CO2 25 2021    BUN 12 2021    BUN 14 2021    BUN 14 2021    CREATININE 0.8 2021    CREATININE 0.9 2021    CREATININE 0.8 2021    CREATININE 0.8 2021    GLUCOSE 125 2021    GLUCOSE 107 2021    GLUCOSE 104 2021    CALCIUM 9.1 2021    CALCIUM 9.7 06/11/2021    CALCIUM 9.3 06/11/2021     Lab Results   Component Value Date    WBC 4.5 11/03/2021    WBC 3.9 05/07/2021    WBC 13.1 02/13/2021    HGB 12.8 11/03/2021    HGB 13.3 05/07/2021    HGB 12.8 02/13/2021    HCT 38.0 11/03/2021    HCT 39.5 05/07/2021    HCT 38.9 02/13/2021    MCV 98.8 11/03/2021    MCV 93.7 05/07/2021    MCV 94.9 02/13/2021     11/03/2021     05/07/2021     02/13/2021     Lab Results   Component Value Date    CHOL 182 06/11/2021    CHOL 194 09/25/2020    CHOL 207 07/10/2019    TRIG 95 06/11/2021    TRIG 61 09/25/2020    TRIG 81 07/10/2019    HDL 52 06/11/2021    HDL 46 09/25/2020    HDL 65 07/10/2019       Vitals 12/22/2021 11/5/2021 48/4/4047   SYSTOLIC 186 364 546   DIASTOLIC 80 61 80   Site - Left Upper Arm -   Position - - -   Cuff Size - - -   Pulse 75 68 73   Temp - - -   Resp - - -   SpO2 97 95 97   Weight 196 lb 3.2 oz 195 lb 196 lb   Height - - 5' 5\"   Body mass index - - 32.61 kg/m2   Some recent data might be hidden       Urinary Frequency   This is a new problem. The current episode started yesterday. Associated symptoms include flank pain, frequency, nausea and urgency. Pertinent negatives include no chills, discharge, hematuria, hesitancy, sweats or vomiting. Review of Systems   Constitutional: Negative for chills. Gastrointestinal: Positive for nausea. Negative for vomiting. Genitourinary: Positive for flank pain, frequency and urgency. Negative for hematuria and hesitancy. Objective   Physical Exam  Vitals and nursing note reviewed. Constitutional:       Appearance: Normal appearance. Cardiovascular:      Rate and Rhythm: Normal rate. Pulmonary:      Effort: Pulmonary effort is normal.   Neurological:      Mental Status: She is alert. Psychiatric:         Mood and Affect: Mood normal.         Behavior: Behavior normal.         Thought Content:  Thought content normal.         Judgment: Judgment normal.            This dictation was generated by voice recognition computer software. Although all attempts are made to edit the dictation for accuracy, there may be errors in the transcription that are not intended. An electronic signature was used to authenticate this note.     --Shane Sifuentes MD

## 2021-12-23 LAB — URINE CULTURE, ROUTINE: NORMAL

## 2021-12-23 RX ORDER — METHOTREXATE 2.5 MG/1
TABLET ORAL
Qty: 20 TABLET | Refills: 0 | Status: SHIPPED | OUTPATIENT
Start: 2021-12-23 | End: 2022-01-25 | Stop reason: SDUPTHER

## 2021-12-27 ENCOUNTER — PATIENT MESSAGE (OUTPATIENT)
Dept: INTERNAL MEDICINE CLINIC | Age: 56
End: 2021-12-27

## 2021-12-27 ENCOUNTER — VIRTUAL VISIT (OUTPATIENT)
Dept: INTERNAL MEDICINE CLINIC | Age: 56
End: 2021-12-27
Payer: COMMERCIAL

## 2021-12-27 DIAGNOSIS — R52 BODY ACHES: ICD-10-CM

## 2021-12-27 DIAGNOSIS — R05.9 COUGH: ICD-10-CM

## 2021-12-27 DIAGNOSIS — J02.9 SORE THROAT: ICD-10-CM

## 2021-12-27 DIAGNOSIS — J06.9 ACUTE URI: Primary | ICD-10-CM

## 2021-12-27 DIAGNOSIS — Z20.822 SUSPECTED COVID-19 VIRUS INFECTION: ICD-10-CM

## 2021-12-27 PROCEDURE — 87804 INFLUENZA ASSAY W/OPTIC: CPT | Performed by: NURSE PRACTITIONER

## 2021-12-27 PROCEDURE — 99214 OFFICE O/P EST MOD 30 MIN: CPT | Performed by: NURSE PRACTITIONER

## 2021-12-27 PROCEDURE — 87880 STREP A ASSAY W/OPTIC: CPT | Performed by: NURSE PRACTITIONER

## 2021-12-27 RX ORDER — OSELTAMIVIR PHOSPHATE 75 MG/1
75 CAPSULE ORAL 2 TIMES DAILY
Qty: 10 CAPSULE | Refills: 0 | Status: SHIPPED | OUTPATIENT
Start: 2021-12-27 | End: 2022-01-01

## 2021-12-27 NOTE — TELEPHONE ENCOUNTER
From: Julia Metz  To: Dr. Shirley Quarles: 12/27/2021 11:54 AM EST  Subject: Fly Exposure     Dr Alina Whitehead I had a virtual visit with Ivon Yo today and I was sent to get a Covid test. My son has had the same symptoms but his temperature was higher. He tested positive for the flu just a few minutes ago. Should I assume I too have the flu? Should I quarantine?  Thank you

## 2021-12-27 NOTE — PROGRESS NOTES
2021    TELEHEALTH EVALUATION -- Audio/Visual (During NTEFG-55 public health emergency)    HPI:    Cosmo Lanier (:  1965) has requested an audio/video evaluation for the following concern(s):    VV for URI symptoms that started yesterday morning. Cough, headaches, hoarseness, sore throat, body aches, low grade fevers. Denies any dyspnea. Yesterday and today more than half of family members have become ill - no one has yet to be tested for flu, strep or covid. son is not vaccinate and he was the first one to come up with symptoms. Co-worker was diagnosed with bronchitis. Pt is taking dayquil with mild relief. Review of Systems   Negative other than HPI     Prior to Visit Medications    Medication Sig Taking? Authorizing Provider   methotrexate (RHEUMATREX) 2.5 MG chemo tablet TAKE 5 TABLETS BY MOUTH ONE TIME PER WEEK Yes Milton Fontenot MD   sulfamethoxazole-trimethoprim (BACTRIM DS;SEPTRA DS) 800-160 MG per tablet Take 1 tablet by mouth 2 times daily for 7 days Yes Billie Sheets MD   albuterol sulfate HFA (PROVENTIL HFA) 108 (90 Base) MCG/ACT inhaler Inhale 2 puffs into the lungs every 4 hours as needed for Wheezing or Shortness of Breath Yes MELISSA Dumont CNP   sulfaSALAzine (AZULFIDINE) 500 MG tablet Take 2 tablets by mouth 2 times daily Yes Milton Fontenot MD   folic acid (FOLVITE) 1 MG tablet Take 1 tablet by mouth daily Yes Milton Fontenot MD   pilocarpine (SALAGEN) 5 MG tablet Take 1 tablet by mouth 3 times daily Yes Milton Fontenot MD   SUMAtriptan (IMITREX) 100 MG tablet Take 1 tablet by mouth once as needed for Migraine May repeat once after 2 hours if needed. No more than 2 per 24 hour period.  Yes Yandel Conde DO   amitriptyline (ELAVIL) 50 MG tablet Take 1 tablet by mouth nightly May take an additional 50 mg prn insomnia Yes Yandel Conde, DO   busPIRone (BUSPAR) 5 MG tablet Take 1 tablet by mouth 3 times daily as needed (anxiety) Yes [facility] Kacey Yu DO   olmesartan (BENICAR) 20 MG tablet Take 0.5 tablets by mouth daily Yes Belle Felix DO   DULoxetine (CYMBALTA) 60 MG extended release capsule Take 1 capsule by mouth daily Take with 30 mg  Patient taking differently: Take 60 mg by mouth nightly  Yes Belle Felix DO   metoprolol tartrate (LOPRESSOR) 50 MG tablet Take 1 tablet by mouth 2 times daily Yes Belle Felix DO   Cholecalciferol (VITAMIN D3) 2000 units CAPS Take by mouth Yes Historical Provider, MD   DULoxetine (CYMBALTA) 30 MG extended release capsule Take 1 capsule by mouth daily Take with 60 mg Cymbalta  Belle Felix DO       Social History     Tobacco Use    Smoking status: Never Smoker    Smokeless tobacco: Never Used   Vaping Use    Vaping Use: Never used   Substance Use Topics    Alcohol use: Yes     Comment: every month or so    Drug use: Yes     Types: Marijuana Estil Catena)     Comment: last 2019            PHYSICAL EXAMINATION:  [ INSTRUCTIONS:  \"[x]\" Indicates a positive item  \"[]\" Indicates a negative item  -- DELETE ALL ITEMS NOT EXAMINED]  Vital Signs: (As obtained by patient/caregiver or practitioner observation)    Patient-Reported Vitals 12/27/2021   Patient-Reported Weight 196lbs   Patient-Reported Height -   Patient-Reported Systolic -   Patient-Reported Diastolic -   Patient-Reported Pulse -   Patient-Reported Temperature 99.5   Patient-Reported SpO2 -       Physical Exam  Constitutional:       General: She is not in acute distress. Appearance: Normal appearance. She is ill-appearing (not acute). HENT:      Head: Normocephalic and atraumatic. Mouth/Throat:      Comments: Voice is hoarse   Pulmonary:      Effort: Pulmonary effort is normal. No respiratory distress. Comments: Cough noted during VV. No conversational dyspnea noted during VV. Neurological:      General: No focal deficit present. Mental Status: She is alert and oriented to person, place, and time.  Mental status is at baseline. Psychiatric:         Mood and Affect: Mood normal.         Behavior: Behavior normal.          Other pertinent observable physical exam findings-     Due to this being a TeleHealth encounter, evaluation of the following organ systems is limited: Vitals/Constitutional/EENT/Resp/CV/GI//MS/Neuro/Skin/Heme-Lymph-Imm. ASSESSMENT/PLAN:  Acute URI/ sore throat/ fever/ suspect covid 19/ body aches/ cough   Uncontrolled. - COVID-19; Future  - POCT flu and strep   Supportive care reviewed  Humidified air  Honey lemon tea  Nasal rinse prn  Flonase daily  NSAIDs/ tylenol for pain and fever  Mucinex prn for congestion   If covid +:   Ambulation benefits reviewed- encouraged   Daily multivitamin with zinc  Prone sleeping for SOB   covid transmission precautions reviewed      Reviewed strict follow up criteria     An  electronic signature was used to authenticate this note. --MELISSA Spence - CNP on 12/27/2021 at 8:54 AM        Pursuant to the emergency declaration under the [facility] waiver authority and the [employer] Act, this Virtual  Visit was conducted, with patient's consent, to reduce the patient's risk of exposure to COVID-19 and provide continuity of care for an established patient. Services were provided through a video synchronous discussion virtually to substitute for in-person clinic visit.

## 2021-12-28 LAB — SARS-COV-2: NOT DETECTED

## 2022-01-15 DIAGNOSIS — F32.A CHRONIC DEPRESSION: ICD-10-CM

## 2022-01-15 DIAGNOSIS — F41.9 ANXIETY: ICD-10-CM

## 2022-01-17 ENCOUNTER — TELEPHONE (OUTPATIENT)
Dept: INFUSION THERAPY | Age: 57
End: 2022-01-17

## 2022-01-17 RX ORDER — DULOXETIN HYDROCHLORIDE 30 MG/1
CAPSULE, DELAYED RELEASE ORAL
Qty: 90 CAPSULE | Refills: 3 | Status: SHIPPED | OUTPATIENT
Start: 2022-01-17 | End: 2022-03-25 | Stop reason: SDUPTHER

## 2022-01-17 NOTE — TELEPHONE ENCOUNTER
Called patient left message she needs to come into office and be seen.  To call back if she needs to reschedule

## 2022-01-17 NOTE — TELEPHONE ENCOUNTER
Pt's requesting virtual visit. 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 This is reduces availability for md visit. I scheduled the patient for 01/19/2022 @ 3 pm.  Please call back if virtual not approved.

## 2022-01-21 NOTE — PROGRESS NOTES
TELEHEALTH EVALUATION -- Audio/Visual (During BSDTU-90 public health emergency)    Pursuant to the emergency declaration under the [facility], [facility] waiver authority and the [employer] Act, this Virtual  Visit was conducted, with patient's consent, to reduce the patient's risk of exposure to COVID-19 and provide continuity of care for an established patient. Services were provided through a video synchronous discussion virtually to substitute for in-person clinic visit. The visit was conducted from the patient's home and my office. RHEUMATOLOGY TELEHEALTH VIDEO VISIT NOTE    SUBJECTIVE:    Background:   Jeevan Alicea (:  1965) has requested an audio/video evaluation for the following rheumatologic concern(s):    Jeevan Alicea is a 62 y.o. female w/ prior Dx of long standing PsO, PsA, secondary Sjogren syndrome and fibromyalgia previously followed by rheumatologist (Dr. Ramin Solares) in [facility].        PMHx pertinent for chronic back pain from DDD, spondylosis and facet arthropathy of L-spine (follows w/ Dr. Gomez Mcfadden), KARTHIK noncompliant w/ CPAP, HTN, pre-diabetes, fatty liver, IBS, depression and anxiety.     Per prior rheumatologist, Dr. Mark Khan note from 17: Dx of PsA on Humira \"was doing well initially on Humira, still flares, no synovitis\", Humira was switched to Stelara in 3/17.  PsA was noted to improve on Stelara per rheumatologist note from 17.     Reviewed  Dermatology note from 18, Dx of PsO w/ PsA \"previously on many biologics per Rheumatology\", exam at the time revealed \"palms w/ few pink thin scaling plaques\".      Current rheum meds:  SSZ at 1000 mg BID: started in 3/2020  MTX 5 tabs/wk: started in 2021  Pilocarpine 5 mg TID  Cymbalta 90 mg daily: prescribed by PCP  Elavil 30 mg QHS: prescribed by PCP  OTC Biotene products, artificial tears     Prior rheum meds:  Meloxicam provided short term pain relief but worsened her GERD, she thinks she took Celecoxib for a couple of yrs in her 30s  Celecoxib 100 mg BID: caused GERD  Medrol dose pack: significantly improves joint pain  MTX: per pt oral MTX caused thrush, she thinks this \"worked a little bit\"  Humira: per pt this was [employer]" but it stopped working after a few months  Stelara: per pt this worked mainly for her joints - she felt this worked but stopped after two months d/t insurance issues   Flexeril: ineffective for back pain  Gabapentin: does not remember response  Paxil, Effexor    Past medical/surgical history, medications and allergies are reviewed and updated as appropriate. Interval Hx:   Pt states she started MTX in 11/2021. She has been tolerating 5 tabs/wk w/o any s/e. She remains on SSZ. She reports improvement in her finger swelling and morning stiffness since adding MTX. Skin remains mostly clear. She has a tiny spot on her R eyelid. She reports good relief from Pilocarpine which she takes 1-2x daily.      Rheumatologic ROS:  Constitutional: denies chronic fatigue, fever/chills, night sweats, unintentional weight loss  Integumentary: +chronic diffuse hair thinning, denies photosensitivity, rash, or Sx of Raynaud's phenomenon  Eyes: +dry eyes lately she feels \"they're irritating\" has not had any recent eye exam, denies redness or pain, visual disturbance, or floaters  Nose: denies nasal ulcers or recurrent sinusitis  Oral cavity: +dry mouth, denies oral ulcers  Cardiovascular: denies CP, palpitations, Hx of pericardial effusion or pericarditis  Respiratory: denies SOB, cough, hemoptysis, or pleurisy  Gastrointestinal: +chronic GERD refer to above HPI, denies chronic diarrhea or bloody stools  Musculoskeletal:  refer to above HPI     Allergies   Allergen Reactions    Morphine Other (See Comments)     Chest tightness    Norco [Hydrocodone-Acetaminophen] Other (See Comments)     Bad headache       Past Medical History:        Diagnosis Date    Chronic depression     DDD (degenerative disc disease), lumbar     Fatty liver     MRI 3/2021 mild    Fibromyalgia     Hiatal hernia     HTN (hypertension)     Obesity (BMI 30.0-34. 9)     BMI 32.68     KARTHIK (obstructive sleep apnea)     has mask, doesn't wear it    Pneumonia     in her 35s had pna B 7 yrs in a row    Psoriatic arthritis ([address]        Past Surgical History:        Procedure Laterality Date    BLADDER SUSPENSION  2004     SECTION  08/31/1987    x1    CHOLECYSTECTOMY  2000    ENDOMETRIAL ABLATION  2007    HIATAL HERNIA REPAIR N/A 2021    LAPAROSCOPIC PARAESOPHAGEAL HIATAL HERNIA REPAIR, NISSEN FUNDOPLICATION performed by Anahi Ayala MD at [facility], [address]      heavy menses    NASAL SEPTUM SURGERY  2005    NERVE SURGERY Bilateral 2019    BILATERAL L4-5 AND L5-S1 LUMBAR FACET INJECTIONS WITH FLUOROSCOPY performed by Mary Felix MD at [place] [address]         Medications:    Current Outpatient Medications   Medication Sig Dispense Refill    methotrexate (RHEUMATREX) 2.5 MG chemo tablet TAKE 5 TABLETS BY MOUTH ONE TIME PER WEEK 60 tablet 0    folic acid (FOLVITE) 1 MG tablet Take 1 tablet by mouth daily 90 tablet 0    sulfaSALAzine (AZULFIDINE) 500 MG tablet Take 2 tablets by mouth 2 times daily 360 tablet 0    pilocarpine (SALAGEN) 5 MG tablet Take 1 tablet by mouth 3 times daily 270 tablet 0    DULoxetine (CYMBALTA) 30 MG extended release capsule TAKE 1 CAPSULE DAILY, TAKE WITH 60MG CYMBALTA 90 capsule 3    albuterol sulfate HFA (PROVENTIL HFA) 108 (90 Base) MCG/ACT inhaler Inhale 2 puffs into the lungs every 4 hours as needed for Wheezing or Shortness of Breath 18 g 3    SUMAtriptan (IMITREX) 100 MG tablet Take 1 tablet by mouth once as needed for Migraine May repeat once after 2 hours if needed. No more than 2 per 24 hour period.  9 tablet 3    amitriptyline (ELAVIL) 50 MG tablet Take 1 tablet by mouth nightly May take an additional 50 mg prn insomnia 180 tablet 1    busPIRone (BUSPAR) 5 MG tablet Take 1 tablet by mouth 3 times daily as needed (anxiety) 90 tablet 3    olmesartan (BENICAR) 20 MG tablet Take 0.5 tablets by mouth daily 30 tablet 3    DULoxetine (CYMBALTA) 60 MG extended release capsule Take 1 capsule by mouth daily Take with 30 mg (Patient taking differently: Take 60 mg by mouth nightly ) 90 capsule 3    metoprolol tartrate (LOPRESSOR) 50 MG tablet Take 1 tablet by mouth 2 times daily 180 tablet 3    Cholecalciferol (VITAMIN D3) 2000 units CAPS Take by mouth       No current facility-administered medications for this visit. OBJECTIVE:    PHYSICAL EXAMINATION:  Due to this being a TeleHealth encounter, evaluation of the following organ systems is limited: Vitals/Constitutional/EENT/Resp/CV/GI//MS/Neuro/Skin/Heme-Lymph-Imm. Constitutional: Normal  Level of distress: Normal  Overall appearance: Normal  Psychiatric: Normal, Appropriate mood and affect. Good insight, good judgment. Orientation: Oriented to time, place, person and situation. Eyes: Vision grossly intact, no visible conjunctival injection  Hearing: Grossly intact  Musculoskeletal: (exam limited by TeleHealth encounter), no visible synovitis or dactylitis, full fist formation    DATA:  Labs:   I personally reviewed interval labs and discussed w/ the pt in detail which showed:    Lab Results   Component Value Date    WBC 3.8 (L) 12/22/2021    HGB 12.2 12/22/2021    HCT 37.5 12/22/2021    .8 (H) 12/22/2021     12/22/2021    LYMPHOPCT 36.2 12/22/2021    RBC 3.72 (L) 12/22/2021    MCH 32.9 12/22/2021    MCHC 32.7 12/22/2021    RDW 14.9 12/22/2021     Lab Results   Component Value Date     06/18/2021    K 4.4 06/18/2021     06/18/2021    CO2 27 06/18/2021    BUN 12 06/18/2021    CREATININE 0.8 12/22/2021    GLUCOSE 125 (H) 06/18/2021    CALCIUM 9.1 06/18/2021 PROT 6.5 12/22/2021    LABALBU 4.4 12/22/2021    BILITOT 0.3 12/22/2021    ALKPHOS 111 12/22/2021    AST 15 12/22/2021    ALT 20 12/22/2021    LABGLOM >60 12/22/2021    GFRAA >60 12/22/2021    AGRATIO 2.6 (H) 09/25/2020    GLOB 1.7 09/25/2020     Lab Results   Component Value Date    COLORU YELLOW 12/22/2021    CLARITYU Clear 12/22/2021    GLUCOSEU Negative 12/22/2021    BILIRUBINUR SMALL (A) 12/22/2021    KETUA Negative 12/22/2021    SPECGRAV 1.019 12/22/2021    BLOODU Negative 12/22/2021    PHUR 6.5 12/22/2021    PROTEINU Negative 12/22/2021    UROBILINOGEN 0.2 12/22/2021    NITRU Negative 12/22/2021    LEUKOCYTESUR Negative 12/22/2021    LABMICR Not Indicated 12/22/2021    URINETYPE [employer] 12/22/2021    HYALCAST 1 12/22/2021    WBCUA 2 12/22/2021    RBCUA 4 12/22/2021    EPIU 1 12/22/2021     Lab Results   Component Value Date    VITD25 46.5 09/25/2020     Lab Results   Component Value Date    C3 141.3 07/24/2019     Lab Results   Component Value Date    C4 23.3 07/24/2019     No results found for: ANTIDSDNAIGG     Lab Results   Component Value Date    CRP <3.0 12/22/2021    CRP 4.1 11/03/2021    CRP <0.3 03/04/2020    CRP 1.3 07/24/2019     Lab Results   Component Value Date    SEDRATE 10 03/04/2020    SEDRATE 7 07/24/2019     No results found for: LABURIC     Negative RF, CCP (7/24/19)  Negative HLA B27 (7/24/19)  Negative JULIANO, SSA, SSB, C3 and C4 wnl (7/24/19)  Negative hepatitis B and C serologies, Quantiferon TB, HIV screen    Imaging:  I personally reviewed interval imaging and discussed w/ the pt in detail which included:    MRI L-spine (7/10/19): Mild neural foraminal narrowing.  No significant spinal canal stenosis.       X-rays (7/24/19):  R hand:  No fracture or dislocation. No underlying degenerative changes. Joint spaces are normal with no significant osteophytes. No inflammatory changes. No erosions. No soft tissue swelling.      L hand:  No fracture or dislocation. No underlying degenerative changes. Joint spaces are normal with no significant osteophytes.  No inflammatory changes. No erosions. No soft tissue swelling.      R knee:  No fracture or dislocation. No underlying degenerative changes. Joint spaces are normal with no significant osteophytes.  No inflammatory changes. No erosions. No soft tissue swelling.      L knee:  No fracture or dislocation. No underlying degenerative changes. Joint spaces are normal with no significant osteophytes. No inflammatory changes. No erosions. No soft tissue swelling.      SI joints:  Normal, symmetric appearance of the b/l SI joints.  No widening or significant sclerosis.  No erosions are appreciated.  Pelvic bones are otherwise unremarkable.  No significant soft tissue findings.      I independently reviewed above X-rays - mild OA changes in the b/l PIP joints otherwise well preserved joint spaces, no significant juxta-articular osteopenia, no erosive changes seen. Barron Friend w/ mild medial joint space narrowing, no chondrocalcinosis.  SI joints patent w/o erosive changes, some sclerosis.     MRI R hand (9/3/19)  No erosive changes or synovitis.      MRI pelvis (9/3/19): No evidence of active sacroiliitis. Above results were discussed w/ the pt in detail during today's visit. ASSESSMENT/PLAN:   1. Psoriatic arthritis ([facility] [address] [facility]  Assessment & Plan:  - inflammatory arthritis now well controlled on MTX 5 tabs/wk and SSZ 1000 mg BID. - cont current IS regimen. Orders:  -     methotrexate (RHEUMATREX) 2.5 MG chemo tablet; TAKE 5 TABLETS BY MOUTH ONE TIME PER WEEK, Disp-60 tablet, [id number]  -     folic acid (FOLVITE) 1 MG tablet; Take 1 tablet by mouth daily, Disp-90 tablet, R-0Normal  -     sulfaSALAzine (AZULFIDINE) 500 MG tablet; Take 2 tablets by mouth 2 times daily, Disp-360 tablet, R-0Normal  -     C-Reactive Protein; Future  2. Psoriasis  Assessment & Plan:  - skin remains mostly clear on SSZ and MTX as above #1.   Orders:  -     methotrexate (RHEUMATREX) 2.5 MG chemo tablet; TAKE 5 TABLETS BY MOUTH ONE TIME PER WEEK, Disp-60 tablet, R-0Normal  -     C-Reactive Protein; Future  3. Sjogren syndrome, unspecified ([address]  Assessment & Plan:  - chronic sicca, pt stated her ophthalmologist confirmed her eye dryness. - partial relief from OTC artificial tears and Biotene products. She is seeing good response from Pilocarpine. Orders:  -     pilocarpine (SALAGEN) 5 MG tablet; Take 1 tablet by mouth 3 times daily, Disp-270 tablet, R-0Normal  -     CBC Auto Differential; Future  -     CBC Auto Differential; Future  4. Chronic leukopenia  Assessment & Plan:  - discussed her mild leukopenia from 12/2021 w/ normal differential. Likely medication induced. She will repeat her CBC w/ diff in the next 1-2 wks. Orders:  -     CBC Auto Differential; Future  -     JULIANO Reflex to Antibody Cascade; Future  -     C3 Complement; Future  -     C4 Complement; Future  -     CBC Auto Differential; Future  5. High risk medication use  Assessment & Plan:  - refer to above #3 for leukopenia w/u. - she will repeat safety labs at the end of March for SSZ and MTX.  - discussed her immunocompromised state, previously discussed her vaccines. Orders:  -     CBC Auto Differential; Future  -     Hepatic Function Panel; Future  -     CBC Auto Differential; Future  -     Creatinine, Serum; Future    Return in about 3 months (around 4/25/2022) for lab result discussion and treatment plan, medication monitoring. The risks and benefits of my recommendations, as well as other treatment options, benefits and side effects were discussed with the patient today. Questions were answered. --Natalya Harding MD on 1/25/2022 at 4:50 PM    An electronic signature was used to authenticate this note.

## 2022-01-25 ENCOUNTER — VIRTUAL VISIT (OUTPATIENT)
Dept: RHEUMATOLOGY | Age: 57
End: 2022-01-25
Payer: COMMERCIAL

## 2022-01-25 DIAGNOSIS — D72.819 CHRONIC LEUKOPENIA: ICD-10-CM

## 2022-01-25 DIAGNOSIS — M35.00 SJOGREN SYNDROME, UNSPECIFIED (HCC): ICD-10-CM

## 2022-01-25 DIAGNOSIS — L40.9 PSORIASIS: ICD-10-CM

## 2022-01-25 DIAGNOSIS — Z79.899 HIGH RISK MEDICATION USE: ICD-10-CM

## 2022-01-25 DIAGNOSIS — L40.50 PSORIATIC ARTHRITIS (HCC): Primary | ICD-10-CM

## 2022-01-25 PROCEDURE — 99215 OFFICE O/P EST HI 40 MIN: CPT | Performed by: INTERNAL MEDICINE

## 2022-01-25 RX ORDER — SULFASALAZINE 500 MG/1
1000 TABLET ORAL 2 TIMES DAILY
Qty: 360 TABLET | Refills: 0 | Status: SHIPPED | OUTPATIENT
Start: 2022-01-25 | End: 2022-03-09 | Stop reason: SDUPTHER

## 2022-01-25 RX ORDER — FOLIC ACID 1 MG/1
1 TABLET ORAL DAILY
Qty: 90 TABLET | Refills: 0 | Status: SHIPPED | OUTPATIENT
Start: 2022-01-25 | End: 2022-03-09 | Stop reason: SDUPTHER

## 2022-01-25 RX ORDER — METHOTREXATE 2.5 MG/1
TABLET ORAL
Qty: 60 TABLET | Refills: 0 | Status: SHIPPED | OUTPATIENT
Start: 2022-01-25 | End: 2022-03-09 | Stop reason: SDUPTHER

## 2022-01-25 RX ORDER — PILOCARPINE HYDROCHLORIDE 5 MG/1
5 TABLET, FILM COATED ORAL 3 TIMES DAILY
Qty: 270 TABLET | Refills: 0 | Status: SHIPPED | OUTPATIENT
Start: 2022-01-25 | End: 2022-03-09 | Stop reason: SDUPTHER

## 2022-01-25 NOTE — ASSESSMENT & PLAN NOTE
- inflammatory arthritis now well controlled on MTX 5 tabs/wk and SSZ 1000 mg BID. - cont current IS regimen.

## 2022-01-25 NOTE — ASSESSMENT & PLAN NOTE
- discussed her mild leukopenia from 12/2021 w/ normal differential. Likely medication induced. She will repeat her CBC w/ diff in the next 1-2 wks.

## 2022-01-25 NOTE — ASSESSMENT & PLAN NOTE
- refer to above #3 for leukopenia w/u. - she will repeat safety labs at the end of March for SSZ and MTX.  - discussed her immunocompromised state, previously discussed her vaccines.

## 2022-01-25 NOTE — ASSESSMENT & PLAN NOTE
- chronic sicca, pt stated her ophthalmologist confirmed her eye dryness. - partial relief from OTC artificial tears and Biotene products. She is seeing good response from Pilocarpine.

## 2022-01-28 DIAGNOSIS — L40.50 PSORIATIC ARTHRITIS (HCC): ICD-10-CM

## 2022-01-29 RX ORDER — SULFASALAZINE 500 MG/1
TABLET ORAL
Qty: 348 TABLET | Refills: 0 | OUTPATIENT
Start: 2022-01-29

## 2022-02-03 ASSESSMENT — ENCOUNTER SYMPTOMS
SHORTNESS OF BREATH: 1
BLOOD IN STOOL: 0
CHEST TIGHTNESS: 0
COUGH: 0
NAUSEA: 0
PHOTOPHOBIA: 0
ABDOMINAL DISTENTION: 0
ABDOMINAL PAIN: 0

## 2022-02-07 ENCOUNTER — OFFICE VISIT (OUTPATIENT)
Dept: CARDIOLOGY CLINIC | Age: 57
End: 2022-02-07
Payer: COMMERCIAL

## 2022-02-07 VITALS
DIASTOLIC BLOOD PRESSURE: 76 MMHG | SYSTOLIC BLOOD PRESSURE: 118 MMHG | OXYGEN SATURATION: 96 % | HEART RATE: 71 BPM | BODY MASS INDEX: 32.11 KG/M2 | HEIGHT: 65 IN | WEIGHT: 192.7 LBS

## 2022-02-07 DIAGNOSIS — I51.7 LVH (LEFT VENTRICULAR HYPERTROPHY): Primary | ICD-10-CM

## 2022-02-07 DIAGNOSIS — I51.89 DIASTOLIC DYSFUNCTION: ICD-10-CM

## 2022-02-07 DIAGNOSIS — E78.2 MIXED HYPERLIPIDEMIA: ICD-10-CM

## 2022-02-07 DIAGNOSIS — R06.02 SHORTNESS OF BREATH: ICD-10-CM

## 2022-02-07 PROCEDURE — 99214 OFFICE O/P EST MOD 30 MIN: CPT | Performed by: INTERNAL MEDICINE

## 2022-02-07 NOTE — Clinical Note
Doing okay. Stress test looked okay. PFTS with mild obstructive lung disease. Will continue lifestyle modification for hyperlipidemia given ascvd of 2.7%. I will see her back in a year. Thanks!   Agatha Thompson

## 2022-02-07 NOTE — LETTER
[address] [place] [address] [address] [id number]  Phone: [phone]  Fax: [address], DO    February 7, 2022     Garth Gan, [address] [place] [id number]    Patient: Michael Kaiser   MR Number: [id number]   YOB: 1965   Date of Visit: 2/7/2022       Dear Garth Gan:    Thank you for referring Marielos Lerner to me for evaluation/treatment. Below are the relevant portions of my assessment and plan of care. If you have questions, please do not hesitate to call me. I look forward to following Corey Larry along with you.     Sincerely,      Fannie Soler, DO I have personally seen and examined this patient.  I have fully participated in the care of this patient. I have reviewed all pertinent clinical information, including history, physical exam, plan and the Resident’s note and agree except as noted.

## 2022-02-12 DIAGNOSIS — G43.109 MIGRAINE WITH AURA AND WITHOUT STATUS MIGRAINOSUS, NOT INTRACTABLE: ICD-10-CM

## 2022-02-14 RX ORDER — AMITRIPTYLINE HYDROCHLORIDE 50 MG/1
TABLET, FILM COATED ORAL
Qty: 180 TABLET | Refills: 1 | Status: SHIPPED | OUTPATIENT
Start: 2022-02-14 | End: 2022-10-19

## 2022-02-14 RX ORDER — OLMESARTAN MEDOXOMIL 20 MG/1
TABLET ORAL
Qty: 45 TABLET | Refills: 2 | Status: SHIPPED | OUTPATIENT
Start: 2022-02-14

## 2022-02-19 DIAGNOSIS — L40.50 PSORIATIC ARTHRITIS (HCC): ICD-10-CM

## 2022-02-21 RX ORDER — SULFASALAZINE 500 MG/1
TABLET ORAL
Qty: 360 TABLET | Refills: 0 | OUTPATIENT
Start: 2022-02-21

## 2022-02-25 DIAGNOSIS — Z79.899 HIGH RISK MEDICATION USE: ICD-10-CM

## 2022-02-25 DIAGNOSIS — M35.00 SJOGREN SYNDROME, UNSPECIFIED (HCC): ICD-10-CM

## 2022-02-25 DIAGNOSIS — D72.819 CHRONIC LEUKOPENIA: ICD-10-CM

## 2022-02-25 LAB
BASOPHILS ABSOLUTE: 0 K/UL (ref 0–0.2)
BASOPHILS RELATIVE PERCENT: 0.5 %
C3 COMPLEMENT: 136.3 MG/DL (ref 90–180)
C4 COMPLEMENT: 22.5 MG/DL (ref 10–40)
EOSINOPHILS ABSOLUTE: 0.1 K/UL (ref 0–0.6)
EOSINOPHILS RELATIVE PERCENT: 2.5 %
HCT VFR BLD CALC: 37.2 % (ref 36–48)
HEMOGLOBIN: 12.3 G/DL (ref 12–16)
LYMPHOCYTES ABSOLUTE: 2.1 K/UL (ref 1–5.1)
LYMPHOCYTES RELATIVE PERCENT: 38.5 %
MCH RBC QN AUTO: 33 PG (ref 26–34)
MCHC RBC AUTO-ENTMCNC: 33.1 G/DL (ref 31–36)
MCV RBC AUTO: 99.5 FL (ref 80–100)
MONOCYTES ABSOLUTE: 0.4 K/UL (ref 0–1.3)
MONOCYTES RELATIVE PERCENT: 7 %
NEUTROPHILS ABSOLUTE: 2.8 K/UL (ref 1.7–7.7)
NEUTROPHILS RELATIVE PERCENT: 51.5 %
PDW BLD-RTO: 14.2 % (ref 12.4–15.4)
PLATELET # BLD: 327 K/UL (ref 135–450)
PMV BLD AUTO: 7.8 FL (ref 5–10.5)
RBC # BLD: 3.74 M/UL (ref 4–5.2)
WBC # BLD: 5.4 K/UL (ref 4–11)

## 2022-02-28 LAB — ANTI-NUCLEAR ANTIBODY (ANA): NEGATIVE

## 2022-02-28 RX ORDER — METOPROLOL TARTRATE 50 MG/1
TABLET, FILM COATED ORAL
Qty: 180 TABLET | Refills: 3 | Status: SHIPPED | OUTPATIENT
Start: 2022-02-28

## 2022-03-05 NOTE — PROGRESS NOTES
Khushbu Edwards MD  [facility]) Physicians - Rheumatology    [x] [facility]:  [facility] [address] [facility] [] [facility]:  [address]   Office: [phone]  Fax: [phone]     RHEUMATOLOGY PROGRESS NOTE    ASSESSMENT/PLAN:  Zarina David is a 62 y.o. female w/ prior Dx of long standing PsO, PsA, secondary Sjogren syndrome and fibromyalgia previously followed by rheumatologist (Dr. Eyal Winchester) in [facility].        PMHx pertinent for chronic back pain from DDD, spondylosis and facet arthropathy of L-spine (follows w/ Dr. Denise Hobbs), KARTHIK noncompliant w/ CPAP, HTN, pre-diabetes, fatty liver, IBS, depression and anxiety.     Per prior rheumatologist, Dr. Stacie Miguel note from 1/19/17: Dx of PsA on Humira \"was doing well initially on Humira, still flares, no synovitis\", Humira was switched to Stelara in 3/17.  PsA was noted to improve on Stelara per rheumatologist note from 6/29/17.     Reviewed  Dermatology note from 9/17/18, Dx of PsO w/ PsA \"previously on many biologics per Rheumatology\", exam at the time revealed \"palms w/ few pink thin scaling plaques\".      Current rheum meds:  SSZ at 1000 mg BID: started in 3/2020  MTX 5 tabs/wk: started in 11/2021  Pilocarpine 5 mg TID  Cymbalta 90 mg daily: prescribed by PCP  Elavil 30 mg QHS: prescribed by PCP  OTC Biotene products, artificial tears     Prior rheum meds:  Meloxicam provided short term pain relief but worsened her GERD, she thinks she took Celecoxib for a couple of yrs in her 30s  Celecoxib 100 mg BID: caused GERD  Medrol dose pack: significantly improves joint pain  MTX: per pt oral MTX caused thrush, she thinks this \"worked a little bit\"  Humira: per pt this was [employer]" but it stopped working after a few months  Stelara: per pt this worked mainly for her joints - she felt this worked but stopped after two months d/t insurance issues   Flexeril: ineffective for back pain  Gabapentin: does not remember response  Paxil, Effexor    1. [facility] ([facility]  Assessment & Plan:  - she had mild swelling in her MCP and PIP joint on exam today. - increase MTX dose from 5 to 7 tabs/wk. Cont SSZ 1000 mg BID. We will see her back in 2 months time, discussed adding biologic DMARD if she cont to have active inflammatory arthritis on the higher dose of MTX.  - prescribed low dose Pred taper for PRN use per pt request.  Orders:  -     sulfaSALAzine (AZULFIDINE) 500 MG tablet; Take 2 tablets by mouth 2 times daily, Disp-360 tablet, R-0Normal  -     methotrexate (RHEUMATREX) 2.5 MG chemo tablet; Take 7 tablets by mouth once a week TAKE 5 TABLETS BY MOUTH ONE TIME PER WEEK, Disp-84 tablet, [id number]  -     folic acid (FOLVITE) 1 MG tablet; Take 1 tablet by mouth daily, Disp-90 tablet, R-0Normal  -     predniSONE (DELTASONE) 10 MG tablet; Take 2 tablets by mouth every morning for 10 days, THEN 1 tablet every morning for 10 days, THEN 0.5 tablets every morning for 10 days. , Disp-35 tablet, R-1Normal  -     C-Reactive Protein; Future  2. Psoriasis  Assessment & Plan:  - skin remains clear on MTX. Orders:  -     methotrexate (RHEUMATREX) 2.5 MG chemo tablet; Take 7 tablets by mouth once a week TAKE 5 TABLETS BY MOUTH ONE TIME PER WEEK, Disp-84 tablet, R-0Normal  -     C-Reactive Protein; Future  -     CBC with Auto Differential; Future  3. Sjogren syndrome, unspecified ([facility]  Assessment & Plan:  - chronic sicca, pt stated her ophthalmologist confirmed her eye dryness. - partial relief from OTC artificial tears and Biotene products. She is seeing good response from Pilocarpine. Orders:  -     pilocarpine (SALAGEN) 5 MG tablet; Take 1 tablet by mouth 3 times daily, Disp-270 tablet, R-0Normal  4. Chronic leukopenia  Assessment & Plan:  - mild leukopenia, pt remains asymptomatic. Negative rheum w/u for CTD. Could be medication induced. Most recent CBC from 2/2022 showed normal WBC.    - will cont to monitor, repeat CBC 4 wks after increasing MTX dose. Orders:  -     CBC with Auto Differential; Future  5. Fibromyalgia  Assessment & Plan:  - well controlled on Cymbalta and Elavil. 6. High risk medication use  Assessment & Plan:  - refer to above #3 for leukopenia w/u. - she will repeat safety labs 4 wks after increasing MTX dose.  - discussed her immunocompromised state, previously discussed her vaccines. Orders:  -     Hepatic Function Panel; Future  -     CBC with Auto Differential; Future  -     Creatinine; Future  -     Quantiferon, Incubated; Future    Return in about 2 months (around 5/9/2022) for medication monitoring, lab result discussion and treatment plan. The risks and benefits of my recommendations, as well as other treatment options, benefits and side effects were discussed with the patient today. Questions were answered. NOTE: This report is transcribed by using voice recognition software dragon. Every effort is made to ensure accuracy; however, inadvertent computerized  transcription errors may be present. SUBJECTIVE:  Past medical/surgical history, medications and allergies are reviewed and updated as appropriate. Interval Hx:   Pt reports some arthralgias and swelling in her hand joints today. Morning stiffness lasts about an hour. She reports infrequent arthritis flares. She reports compliance with MTX and SSZ. She is requesting for a prednisone taper for her upcoming trip to [place] in May. Skin remains clear. She reports good relief from Pilocarpine which she takes 2-3x daily.      Rheumatologic ROS:  Constitutional: denies chronic fatigue, fever/chills, night sweats, unintentional weight loss  Integumentary: +chronic diffuse hair thinning, denies photosensitivity, rash, or Sx of Raynaud's phenomenon  Eyes: +dry eyes, denies redness or pain, visual disturbance, or floaters  Nose: denies nasal ulcers or recurrent sinusitis  Oral cavity: +dry mouth, denies oral ulcers  Cardiovascular: denies CP, palpitations, Hx of pericardial effusion or pericarditis  Respiratory: denies SOB, cough, hemoptysis, or pleurisy  Gastrointestinal: +chronic GERD refer to above HPI, denies chronic diarrhea or bloody stools  Musculoskeletal:  refer to above HPI     Allergies   Allergen Reactions    Morphine Other (See Comments)     Chest tightness    Norco [Hydrocodone-Acetaminophen] Other (See Comments)     Bad headache       Past Medical History:        Diagnosis Date    Chronic depression     DDD (degenerative disc disease), lumbar     Fatty liver     MRI 3/2021 mild    Fibromyalgia     Hiatal hernia     HTN (hypertension)     Obesity (BMI 30.0-34. 9)     BMI 32.68     KARTHIK (obstructive sleep apnea)     has mask, doesn't wear it    Pneumonia     in her 35s had pna B 7 yrs in a row    P[facility]iatic [facility] ([facility]        Past Surgical History:        Procedure Laterality Date    BLADDER SUSPENSION  2004     SECTION  08/31/1987    x1    CHOLECYSTECTOMY  2000    ENDOMETRIAL ABLATION      HIATAL HERNIA REPAIR N/A 2021    LAPAROSCOPIC PARAESOPHAGEAL HIATAL HERNIA REPAIR, NISSEN FUNDOPLICATION performed by Consuelo Shea MD at [place] [address] [facility][note 3]  2009    heavy menses    NASAL SEPTUM SURGERY  2005    NERVE SURGERY Bilateral 2019    BILATERAL L4-5 AND L5-S1 LUMBAR FACET INJECTIONS WITH FLUOROSCOPY performed by Fernandez Shelton MD at [place] [id number]         Medications:    Current Outpatient Medications   Medication Sig Dispense Refill    sulfaSALAzine (AZULFIDINE) 500 MG tablet Take 2 tablets by mouth 2 times daily 360 tablet 0    pilocarpine (SALAGEN) 5 MG tablet Take 1 tablet by mouth 3 times daily 270 tablet 0    methotrexate (RHEUMATREX) 2.5 MG chemo tablet Take 7 tablets by mouth once a week TAKE 5 TABLETS BY MOUTH ONE TIME PER WEEK 84 tablet 0    folic acid (FOLVITE) 1 MG tablet Take 1 tablet by mouth daily 90 tablet 0    predniSONE (DELTASONE) 10 MG tablet Take 2 tablets by mouth every morning for 10 days, THEN 1 tablet every morning for 10 days, THEN 0.5 tablets every morning for 10 days. 35 tablet 1    metoprolol tartrate (LOPRESSOR) 50 MG tablet TAKE 1 TABLET BY MOUTH TWICE A  tablet 3    olmesartan (BENICAR) 20 MG tablet TAKE 1/2 TABLET BY MOUTH EVERY DAY 45 tablet 2    amitriptyline (ELAVIL) 50 MG tablet TAKE 1 TABLET BY MOUTH NIGHTLY,MAY TAKE AN ADDITIONAL 50 MG AS NEEDED FOR INSOMNIA 180 tablet 1    DULoxetine (CYMBALTA) 30 MG extended release capsule TAKE 1 CAPSULE DAILY, TAKE WITH 60MG CYMBALTA 90 capsule 3    albuterol sulfate HFA (PROVENTIL HFA) 108 (90 Base) MCG/ACT inhaler Inhale 2 puffs into the lungs every 4 hours as needed for Wheezing or Shortness of Breath 18 g 3    SUMAtriptan (IMITREX) 100 MG tablet Take 1 tablet by mouth once as needed for Migraine May repeat once after 2 hours if needed. No more than 2 per 24 hour period. 9 tablet 3    busPIRone (BUSPAR) 5 MG tablet Take 1 tablet by mouth 3 times daily as needed (anxiety) 90 tablet 3    Cholecalciferol (VITAMIN D3) 2000 units CAPS Take by mouth       No current facility-administered medications for this visit.         OBJECTIVE:  Physical Exam:  /78   Pulse 63   Ht 5' 5\" (1.651 m)   Wt 198 lb (89.8 kg)   SpO2 97%   BMI 32.95 kg/m²     GEN: AAOx3, in NAD, well-appearing  HEAD: normocephalic, atraumatic  EYES: no injection or icterus  CVS: RRR  LUNGS: in no acute respiratory distress, CTAB  Upper extremities:              Hands: b/l MCP joints appear slightly puffy TTP, b/l PIP joints slightly boggy and TTP, DIP joints w/o swelling NTTP, full fist formation w/ good  strength              Wrist: b/l wrist joints slightly swollen NTTP, FROM              Elbow: no synovitis or bursitis, b/l lateral epicondyles slightly TTP, FROM  Lower extremities:              Knees: no warmth or effusion present, FROM              Ankles: no synovitis, FROM, 23.3 07/24/2019     No results found for: ANTIDSDNAIGG     No results found for: [facility]     Lab Results   Component Value Date    CRP <3.0 12/22/2021    CRP 4.1 11/03/2021    CRP <0.3 03/04/2020    CRP 1.3 07/24/2019     Lab Results   Component Value Date    SEDRATE 10 03/04/2020    SEDRATE 7 07/24/2019     No results found for: CKTOTAL    Negative RF, CCP (7/24/19)  Negative HLA B27 (7/24/19)  Negative JULIANO x 3 (7/24/19, 3/4/20, 2/25/22)  Negative SSA, SSB (7/24/19)  Negative hepatitis B and C serologies, Quantiferon TB, HIV screen    Imaging:  I personally reviewed interval imaging and discussed w/ the pt in detail which included:    MRI L-spine (7/10/19): Mild neural foraminal narrowing.  No significant spinal canal stenosis.       X-rays (7/24/19):  R hand:  No fracture or dislocation. No underlying degenerative changes. Joint spaces are normal with no significant osteophytes. No inflammatory changes. No erosions. No soft tissue swelling.      L hand:  No fracture or dislocation. No underlying degenerative changes. Joint spaces are normal with no significant osteophytes.  No inflammatory changes. No erosions. No soft tissue swelling.      R knee:  No fracture or dislocation. No underlying degenerative changes. Joint spaces are normal with no significant osteophytes.  No inflammatory changes. No erosions. No soft tissue swelling.      L knee:  No fracture or dislocation. No underlying degenerative changes. Joint spaces are normal with no significant osteophytes. No inflammatory changes. No erosions. No soft tissue swelling.      SI joints:  Normal, symmetric appearance of the b/l SI joints.  No widening or significant sclerosis.  No erosions are appreciated.  Pelvic bones are otherwise unremarkable.  No significant soft tissue findings.      I independently reviewed above X-rays - mild OA changes in the b/l PIP joints otherwise well preserved joint spaces, no significant juxta-articular osteopenia, no erosive changes seen.  Knees w/ mild medial joint space narrowing, no chondrocalcinosis.  SI joints patent w/o erosive changes, some sclerosis.     MRI R hand (9/3/19)  No erosive changes or synovitis.      MRI pelvis (9/3/19): No evidence of active sacroiliitis. Above results were discussed w/ the pt in detail during today's visit.

## 2022-03-09 ENCOUNTER — OFFICE VISIT (OUTPATIENT)
Dept: RHEUMATOLOGY | Age: 57
End: 2022-03-09
Payer: COMMERCIAL

## 2022-03-09 VITALS
OXYGEN SATURATION: 97 % | WEIGHT: 198 LBS | HEIGHT: 65 IN | SYSTOLIC BLOOD PRESSURE: 122 MMHG | HEART RATE: 63 BPM | DIASTOLIC BLOOD PRESSURE: 78 MMHG | BODY MASS INDEX: 32.99 KG/M2

## 2022-03-09 DIAGNOSIS — D72.819 CHRONIC LEUKOPENIA: ICD-10-CM

## 2022-03-09 DIAGNOSIS — M35.00 SJOGREN SYNDROME, UNSPECIFIED (HCC): ICD-10-CM

## 2022-03-09 DIAGNOSIS — L40.50 PSORIATIC ARTHRITIS (HCC): Primary | ICD-10-CM

## 2022-03-09 DIAGNOSIS — L40.9 PSORIASIS: ICD-10-CM

## 2022-03-09 DIAGNOSIS — M79.7 FIBROMYALGIA: ICD-10-CM

## 2022-03-09 DIAGNOSIS — Z79.899 HIGH RISK MEDICATION USE: ICD-10-CM

## 2022-03-09 PROCEDURE — 99215 OFFICE O/P EST HI 40 MIN: CPT | Performed by: INTERNAL MEDICINE

## 2022-03-09 RX ORDER — PILOCARPINE HYDROCHLORIDE 5 MG/1
5 TABLET, FILM COATED ORAL 3 TIMES DAILY
Qty: 270 TABLET | Refills: 0 | Status: SHIPPED | OUTPATIENT
Start: 2022-03-09 | End: 2022-05-10 | Stop reason: SDUPTHER

## 2022-03-09 RX ORDER — SULFASALAZINE 500 MG/1
1000 TABLET ORAL 2 TIMES DAILY
Qty: 360 TABLET | Refills: 0 | Status: SHIPPED | OUTPATIENT
Start: 2022-03-09 | End: 2022-05-10 | Stop reason: SDUPTHER

## 2022-03-09 RX ORDER — FOLIC ACID 1 MG/1
1 TABLET ORAL DAILY
Qty: 90 TABLET | Refills: 0 | Status: SHIPPED | OUTPATIENT
Start: 2022-03-09 | End: 2022-05-10 | Stop reason: SDUPTHER

## 2022-03-09 RX ORDER — METHOTREXATE 2.5 MG/1
17.5 TABLET ORAL WEEKLY
Qty: 84 TABLET | Refills: 0 | Status: SHIPPED | OUTPATIENT
Start: 2022-03-09 | End: 2022-04-07 | Stop reason: SDUPTHER

## 2022-03-09 RX ORDER — PREDNISONE 10 MG/1
TABLET ORAL
Qty: 35 TABLET | Refills: 1 | Status: SHIPPED | OUTPATIENT
Start: 2022-03-09 | End: 2022-04-08

## 2022-03-09 NOTE — ASSESSMENT & PLAN NOTE
- refer to above #3 for leukopenia w/u. - she will repeat safety labs 4 wks after increasing MTX dose.  - discussed her immunocompromised state, previously discussed her vaccines.

## 2022-03-09 NOTE — ASSESSMENT & PLAN NOTE
- mild leukopenia, pt remains asymptomatic. Negative rheum w/u for CTD. Could be medication induced. Most recent CBC from 2/2022 showed normal WBC. - will cont to monitor, repeat CBC 4 wks after increasing MTX dose.

## 2022-03-23 DIAGNOSIS — Q24.8 PERICARDIAL CYST: Primary | ICD-10-CM

## 2022-03-25 DIAGNOSIS — F32.A CHRONIC DEPRESSION: ICD-10-CM

## 2022-03-25 DIAGNOSIS — F41.9 ANXIETY: ICD-10-CM

## 2022-03-25 RX ORDER — DULOXETIN HYDROCHLORIDE 30 MG/1
90 CAPSULE, DELAYED RELEASE ORAL DAILY
Qty: 270 CAPSULE | Refills: 3 | Status: SHIPPED | OUTPATIENT
Start: 2022-03-25

## 2022-04-07 ENCOUNTER — TELEPHONE (OUTPATIENT)
Dept: RHEUMATOLOGY | Age: 57
End: 2022-04-07

## 2022-04-07 ENCOUNTER — OFFICE VISIT (OUTPATIENT)
Dept: PSYCHOLOGY | Age: 57
End: 2022-04-07
Payer: COMMERCIAL

## 2022-04-07 DIAGNOSIS — L40.50 PSORIATIC ARTHRITIS (HCC): ICD-10-CM

## 2022-04-07 DIAGNOSIS — L40.9 PSORIASIS: ICD-10-CM

## 2022-04-07 DIAGNOSIS — D72.819 CHRONIC LEUKOPENIA: ICD-10-CM

## 2022-04-07 DIAGNOSIS — Z79.899 HIGH RISK MEDICATION USE: ICD-10-CM

## 2022-04-07 DIAGNOSIS — F41.1 GAD (GENERALIZED ANXIETY DISORDER): ICD-10-CM

## 2022-04-07 DIAGNOSIS — F33.1 MODERATE EPISODE OF RECURRENT MAJOR DEPRESSIVE DISORDER (HCC): Primary | ICD-10-CM

## 2022-04-07 LAB
ALBUMIN SERPL-MCNC: 4.6 G/DL (ref 3.4–5)
ALP BLD-CCNC: 110 U/L (ref 40–129)
ALT SERPL-CCNC: 18 U/L (ref 10–40)
AST SERPL-CCNC: 16 U/L (ref 15–37)
BASOPHILS ABSOLUTE: 0 K/UL (ref 0–0.2)
BASOPHILS RELATIVE PERCENT: 0.5 %
BILIRUB SERPL-MCNC: 0.3 MG/DL (ref 0–1)
BILIRUBIN DIRECT: <0.2 MG/DL (ref 0–0.3)
BILIRUBIN, INDIRECT: NORMAL MG/DL (ref 0–1)
C-REACTIVE PROTEIN: <3 MG/L (ref 0–5.1)
CREAT SERPL-MCNC: 0.9 MG/DL (ref 0.6–1.1)
EOSINOPHILS ABSOLUTE: 0.1 K/UL (ref 0–0.6)
EOSINOPHILS RELATIVE PERCENT: 2.1 %
GFR AFRICAN AMERICAN: >60
GFR NON-AFRICAN AMERICAN: >60
HCT VFR BLD CALC: 37 % (ref 36–48)
HEMOGLOBIN: 12.4 G/DL (ref 12–16)
LYMPHOCYTES ABSOLUTE: 1.1 K/UL (ref 1–5.1)
LYMPHOCYTES RELATIVE PERCENT: 33.7 %
MCH RBC QN AUTO: 32.6 PG (ref 26–34)
MCHC RBC AUTO-ENTMCNC: 33.5 G/DL (ref 31–36)
MCV RBC AUTO: 97.4 FL (ref 80–100)
MONOCYTES ABSOLUTE: 0.2 K/UL (ref 0–1.3)
MONOCYTES RELATIVE PERCENT: 5.6 %
NEUTROPHILS ABSOLUTE: 2 K/UL (ref 1.7–7.7)
NEUTROPHILS RELATIVE PERCENT: 58.1 %
PDW BLD-RTO: 14.2 % (ref 12.4–15.4)
PLATELET # BLD: 313 K/UL (ref 135–450)
PMV BLD AUTO: 7.8 FL (ref 5–10.5)
RBC # BLD: 3.79 M/UL (ref 4–5.2)
TOTAL PROTEIN: 6.7 G/DL (ref 6.4–8.2)
WBC # BLD: 3.4 K/UL (ref 4–11)

## 2022-04-07 PROCEDURE — 90791 PSYCH DIAGNOSTIC EVALUATION: CPT | Performed by: PSYCHOLOGIST

## 2022-04-07 RX ORDER — METHOTREXATE 2.5 MG/1
17.5 TABLET ORAL WEEKLY
Qty: 84 TABLET | Refills: 0 | Status: SHIPPED | OUTPATIENT
Start: 2022-04-07 | End: 2022-05-10 | Stop reason: SDUPTHER

## 2022-04-07 NOTE — TELEPHONE ENCOUNTER
Please remind pt to obtain safety labs 4 weeks after increasing her MTX dose, I do not see lab results. It's extremely important that she does this.

## 2022-04-07 NOTE — PROGRESS NOTES
Behavioral Health Consultation  Bjorn Clemente, Ph.D.  Clinical Psychologist  4/7/2022  9:10 AM      Time spent with Patient: 25 minutes  This is patient's first [facility] appointment. Reason for Consult:    Chief Complaint   Patient presents with    Depression       Pt provided informed consent for the behavioral health program. Discussed with patient model of service to include the limits of confidentiality (i.e. abuse reporting, suicide intervention, etc.) and short-term intervention focused approach. Pt indicated understanding. Feedback given to PCP. S:  Pt seen per PCP re: depression. Last seen 7 mos ago. Reported depressed mood, poor self-worth, and poor concentration. [place] 3 wks ago daughter is using fentanyl, daughter's boyfriend is using heroin for past 6 mos after daughter had been clean for 6 yrs, and had stolen and pawned some special jewlery, pt had to purchase them back from the pawn shop. [place] on her way to work that they had moved out. Yuliana Landis is now in drug tx. Found out last week daughter had m/c. Then pt's BF (manisha) broke up w her Reford Az starts having babies, I would be the one to take care of them. \" \"I was devestated. \" They were together 5 mos, talking about moving in together and getting . Daughter owes her $1500 on a credit card, and another thousand. \"I'm always on pause. \"     Recommended [facility], provided group info.      O:  MSE:    Appearance    alert, cooperative  Impulsive behavior No  Speech    spontaneous, normal rate, normal volume and well articulated  Mood    Depressed  Affect    depressed affect  Thought Content    intact and cognitive distortions  Thought Process    goal directed and coherent  Associations    logical connections  Insight    Fair  Judgment    Intact  Orientation    oriented to person, place, time, and general circumstances  Memory    recent and remote memory intact  Attention/Concentration    impaired  Morbid ideation No  Suicide Assessment    no suicidal ideation    History:    Social History:   Social History     Socioeconomic History    Marital status:      Spouse name: Not on file    Number of children: Not on file    Years of education: Not on file    Highest education level: Not on file   Occupational History    Not on file   Tobacco Use    Smoking status: Never Smoker    Smokeless tobacco: Never Used   Vaping Use    Vaping Use: Never used   Substance and Sexual Activity    Alcohol use: Yes     Comment: every month or so    Drug use: Yes     Types: Marijuana [place])     Comment: last 2019    Sexual activity: Not on file   Other Topics Concern    Not on file   Social History Narrative    Not on file     Social Determinants of Health     Financial Resource Strain: Low Risk     Difficulty of Paying Living Expenses: Not hard at all   Food Insecurity: No Food Insecurity    Worried About [address] in the Last Year: Never true    [address] [employer] in the Last Year: Never true   Transportation Needs:     Lack of Transportation (Medical): Not on file    Lack of Transportation (Non-Medical):  Not on file   Physical Activity:     Days of Exercise per Week: Not on file    Minutes of Exercise per Session: Not on file   Stress:     Feeling of Stress : Not on file   Social Connections:     Frequency of Communication with Friends and Family: Not on file    Frequency of Social Gatherings with Friends and Family: Not on file    Attends Yazidi Services: Not on file    Active Member of Clubs or Organizations: Not on file    Attends Club or Organization Meetings: Not on file    Marital Status: Not on file   Intimate Partner Violence:     Fear of Current or Ex-Partner: Not on file    Emotionally Abused: Not on file    Physically Abused: Not on file    Sexually Abused: Not on file   Housing Stability:     Unable to Pay for Housing in the Last Year: Not on file    Number of [place] in the Last Year: Not on file    Unstable Housing in the Last Year: Not on file     TOBACCO:   reports that she has never smoked. She has never used smokeless tobacco.  ETOH:   reports current alcohol use. Diagnosis:  Major depressive disorder; recurrent and moderate    Plan:  Pt interventions:  Established rapport, Conducted functional assessment, [place]-setting to identify pt's primary goals for [facility] visit / overall health, Supportive techniques and Identified maladaptive thoughts    Documentation was done using voice recognition dragon software. Every effort was made to ensure accuracy; however, inadvertent, unintentional computerized transcription errors may be present.

## 2022-04-07 NOTE — TELEPHONE ENCOUNTER
Called spoke with patient gave message states understanding and will get labs done before her next follow up on 5/10/22

## 2022-04-07 NOTE — PATIENT INSTRUCTIONS
-Write a list of all the reasons you're deciding to not allow daughter to move back in.   - Plan enjoyable or relaxing activities: be with friends, work on house projects, other kindnesses to yourself such as cozying on the couch, burning a scented candle, eating a favorite meal      [place] [facility]  Location: [address] [place] [address], [facility] [address]  Notes We meet both in person and also virtual. Please contact Wagner Shen at [email or web address] for virtual meeting information.   Meets in [address]    Day Tuesday  Time 7:00 PM

## 2022-04-07 NOTE — TELEPHONE ENCOUNTER
Please let pt know her WBC is slightly low, this could a s/e of her MTX and SSZ. No change to her current medications, we will repeat her CBC at her next f/u visit in a month's time.  Rest of her labs are normal.

## 2022-04-07 NOTE — TELEPHONE ENCOUNTER
Pt was seen 3/9/22 and mtx had been increased to 7 tablets weekly. Prescription had 2 different directions. Prescription is pending with corrected directions of 7 weekly.

## 2022-04-11 LAB
QUANTI TB GOLD PLUS: NEGATIVE
QUANTI TB1 MINUS NIL: 0 IU/ML (ref 0–0.34)
QUANTI TB2 MINUS NIL: 0 IU/ML (ref 0–0.34)
QUANTIFERON MITOGEN: 3.52 IU/ML
QUANTIFERON NIL: 0.02 IU/ML

## 2022-04-11 RX ORDER — BUSPIRONE HYDROCHLORIDE 5 MG/1
5 TABLET ORAL 3 TIMES DAILY PRN
Qty: 90 TABLET | Refills: 3 | Status: SHIPPED | OUTPATIENT
Start: 2022-04-11 | End: 2022-09-12

## 2022-04-22 ENCOUNTER — TELEPHONE (OUTPATIENT)
Dept: CARDIOTHORACIC SURGERY | Age: 57
End: 2022-04-22

## 2022-05-02 ENCOUNTER — TELEPHONE (OUTPATIENT)
Dept: RHEUMATOLOGY | Age: 57
End: 2022-05-02

## 2022-05-02 DIAGNOSIS — D72.819 CHRONIC LEUKOPENIA: ICD-10-CM

## 2022-05-02 DIAGNOSIS — L40.50 PSORIATIC ARTHRITIS (HCC): Primary | ICD-10-CM

## 2022-05-02 NOTE — TELEPHONE ENCOUNTER
Patient called states she is going to get her repeat CBC lab done. I put order in. States she is in a bad flare and has prednisone she will take after she gets her lab done.

## 2022-05-04 DIAGNOSIS — D72.819 CHRONIC LEUKOPENIA: ICD-10-CM

## 2022-05-04 DIAGNOSIS — L40.50 PSORIATIC ARTHRITIS (HCC): ICD-10-CM

## 2022-05-04 LAB
BASOPHILS ABSOLUTE: 0 K/UL (ref 0–0.2)
BASOPHILS RELATIVE PERCENT: 0.7 %
EOSINOPHILS ABSOLUTE: 0 K/UL (ref 0–0.6)
EOSINOPHILS RELATIVE PERCENT: 1.4 %
HCT VFR BLD CALC: 36.2 % (ref 36–48)
HEMOGLOBIN: 11.8 G/DL (ref 12–16)
LYMPHOCYTES ABSOLUTE: 1.2 K/UL (ref 1–5.1)
LYMPHOCYTES RELATIVE PERCENT: 47.9 %
MCH RBC QN AUTO: 32.6 PG (ref 26–34)
MCHC RBC AUTO-ENTMCNC: 32.7 G/DL (ref 31–36)
MCV RBC AUTO: 99.7 FL (ref 80–100)
MONOCYTES ABSOLUTE: 0.3 K/UL (ref 0–1.3)
MONOCYTES RELATIVE PERCENT: 11.8 %
NEUTROPHILS ABSOLUTE: 1 K/UL (ref 1.7–7.7)
NEUTROPHILS RELATIVE PERCENT: 38.2 %
PDW BLD-RTO: 15.5 % (ref 12.4–15.4)
PLATELET # BLD: 281 K/UL (ref 135–450)
PMV BLD AUTO: 7.4 FL (ref 5–10.5)
RBC # BLD: 3.63 M/UL (ref 4–5.2)
WBC # BLD: 2.5 K/UL (ref 4–11)

## 2022-05-05 DIAGNOSIS — D72.819 CHRONIC LEUKOPENIA: Primary | ICD-10-CM

## 2022-05-05 NOTE — RESULT ENCOUNTER NOTE
Please let pt know her WBC is lower than her baseline. This could be medication induced (from either SSZ or MTX). I recently increased her MTX dose from 5 to 7 tabs/wk, I want her to decrease her MTX back down to 5 tabs/wk and repeat her CBC in 2 wks time, placed standing lab order. Please mail her information on [facility] [place], please let me know if she agrees to try this, this is a SC injection wkly for her psoriatic disease that should not affect her CBC.

## 2022-05-06 ENCOUNTER — HOSPITAL ENCOUNTER (OUTPATIENT)
Dept: CT IMAGING | Age: 57
Discharge: HOME | End: 2022-05-06
Payer: COMMERCIAL

## 2022-05-06 DIAGNOSIS — Q24.8 PERICARDIAL CYST: ICD-10-CM

## 2022-05-06 PROCEDURE — 71250 CT THORAX DX C-: CPT

## 2022-05-06 NOTE — PROGRESS NOTES
Kip Sever, MD  [facility]) Physicians - Rheumatology    [x] [facility]:  [facility]  [address] [facility] [] [address]:  [address], [address]   Office: [phone]  Fax: [phone]     RHEUMATOLOGY PROGRESS NOTE    ASSESSMENT/PLAN:  Jose Ledezma is a 62 y.o. female w/ PsA, PsO, secondary Sjogren syndrome and fibromyalgia previously followed by rheumatologist (Dr. Phill Chen) in [place].        PMHx pertinent for chronic back pain from DDD, spondylosis and facet arthropathy of L-spine (follows w/ Dr. Ang Murray), KARTHIK noncompliant w/ CPAP, HTN, pre-diabetes, fatty liver, IBS, depression and anxiety.     Per prior rheumatologist, Dr. Hugh Tavarez note from 1/19/17: Dx of PsA on Humira \"was doing well initially on Humira, still flares, no synovitis\", Humira was switched to Stelara in 3/17.  PsA was noted to improve on Stelara per rheumatologist note from 6/29/17.     Reviewed  Dermatology note from 9/17/18, Dx of PsO w/ PsA \"previously on many biologics per Rheumatology\", exam at the time revealed \"palms w/ few pink thin scaling plaques\".      Current rheum meds:  SSZ at 1000 mg BID: started in 3/2020  MTX 17.5 mg/wk: started in 11/2021  Pilocarpine 5 mg TID  Cymbalta 90 mg daily: prescribed by PCP  Elavil 30 mg QHS: prescribed by PCP  OTC Biotene products, artificial tears     Prior rheum meds:  Meloxicam provided short term pain relief but worsened her GERD, she thinks she took Celecoxib for a couple of yrs in her 30s  Celecoxib 100 mg BID: caused GERD  Medrol dose pack: significantly improves joint pain  MTX: per pt oral MTX caused thrush, she thinks this \"worked a little bit\"  Humira: per pt this was [employer]" but it stopped working after a few months  Stelara: per pt this worked mainly for her joints - she felt this worked but stopped after two months d/t insurance issues   Flexeril: ineffective for back pain  Gabapentin: does not remember response  Paxil, Effexor    1. Psoriatic arthritis ([facility] [address]  Assessment & Plan:  - objectively, her inflammatory arthritis has improved since increasing MTX dose to 17.5 mg PO wkly. Unfortunately, she has developed worsening, asymptomatic leukopenia on high dose MTX and SSZ.  - she held her MTX dose this Monday, she will cont to hold MTX until she repeats her CBC next wk. Discussed resuming MTX at lower dose of 12.5 mg PO wkly if repeat CBC shows improved WBC. Cont SSZ 1000 mg BID for now. - pt previously saw response to Ustekinumab for both her PsA and IBS. She requested to resume this, submit PA for Ustekinumab (start 45 mg SC x 1 on wk 0, 4 then Q12wk) now. Denied Hx of diverticulitis or any other GI Hx. Orders:  -     sulfaSALAzine (AZULFIDINE) 500 MG tablet; Take 2 tablets by mouth 2 times daily, Disp-360 tablet, [id number]  -     folic acid (FOLVITE) 1 MG tablet; Take 1 tablet by mouth daily, Disp-90 tablet, R-0Normal  -     methotrexate (RHEUMATREX) 2.5 MG chemo tablet; Take 5 tablets by mouth once a week, Disp-60 tablet, R-0Normal  -     ustekinumab (STELARA) 45 MG/0.5ML SOSY prefilled syringe; Start 45 mg SC x 1 on wk 0, 4 then Q12wk., Disp-1 mL, R-0Normal  2. Psoriasis  Assessment & Plan:  - skin remains clear on MTX.  - start PA for Ustekinumab as above #1. Orders:  -     methotrexate (RHEUMATREX) 2.5 MG chemo tablet; Take 5 tablets by mouth once a week, Disp-60 tablet, R-0Normal  -     ustekinumab (STELARA) 45 MG/0.5ML SOSY prefilled syringe; Start 45 mg SC x 1 on wk 0, 4 then Q12wk., Disp-1 mL, R-0Normal  3. Sjogren syndrome, unspecified ([facility] [address]  -     pilocarpine (SALAGEN) 5 MG tablet; Take 1 tablet by mouth 3 times daily, Disp-270 tablet, R-0Normal  4. Chronic leukopenia  Assessment & Plan:  - discussed her worsening leukopenia on increased dose of MTX, she remains asymptomatic. Suspect this is likely medication induced.   - she will repeat her CBC next wk and will plan on resuming lower dose of MTX if repeat WBC is improved. For now she will cont same dose of SSZ. Orders:  -     Protein Electrophoresis, Urine; Future  -     Electrophoresis Protein, Serum; Future  5. High risk medication use  Assessment & Plan:  - discussed s/e and risks of Ustekinumab and indications to hold her immunosuppression.  - safety labs for MTX and SSZ Q3mo. Refer to below for worsening leukopenia. Orders:  -     CBC with Auto Differential; Future    Return in about 2 months (around 7/10/2022) for lab result discussion and treatment plan, medication monitoring. The risks and benefits of my recommendations, as well as other treatment options, benefits and side effects were discussed with the patient today. Questions were answered. NOTE: This report is transcribed by using voice recognition software dragon. Every effort is made to ensure accuracy; however, inadvertent computerized  transcription errors may be present. SUBJECTIVE:  Past medical/surgical history, medications and allergies are reviewed and updated as appropriate. Interval Hx:   Pt noted some initial improvement in her joint Sx after increasing MTX dose from 12.5 mg PO wkly to 17.5 mg PO wkly. She developed her most recent flare a month ago, she reports increased pain and swelling in her R hand joints. R leg feels weak when she drives. Hands feel more stiff in the morning, stiffness returns in the evening. Skin remains clear. Denies recurrent infections or skin ulceration. Pt tells me she has IBS, she noted improvement in her IBS Sx when she previously took Humira as well as Stelara. Humira stopped working.     Rheumatologic ROS:  Constitutional: denies chronic fatigue, fever/chills, night sweats, unintentional weight loss  Integumentary: +chronic diffuse hair thinning, denies photosensitivity, rash, or Sx of Raynaud's phenomenon  Eyes: +dry eyes, denies redness or pain, visual disturbance, or floaters  Nose: denies nasal ulcers or recurrent sinusitis  Oral cavity: +dry mouth, denies oral ulcers  Cardiovascular: denies CP, palpitations, Hx of pericardial effusion or pericarditis  Respiratory: denies SOB, cough, hemoptysis, or pleurisy  Gastrointestinal: +chronic GERD refer to above HPI, denies chronic diarrhea or bloody stools  Musculoskeletal:  refer to above HPI     Allergies   Allergen Reactions    Norco [Hydrocodone-Acetaminophen] Other (See Comments)     Bad headache       Past Medical History:        Diagnosis Date    Chronic depression     DDD (degenerative disc disease), lumbar     Fatty liver     MRI 3/2021 mild    Fibromyalgia     Hiatal hernia     HTN (hypertension)     Obesity (BMI 30.0-34. 9)     BMI 32.68     KARTHIK (obstructive sleep apnea)     has mask, doesn't wear it    Pneumonia     in her 35s had pna B 7 yrs in a row    Psoriatic arthritis ([facility] [address]        Past Surgical History:        Procedure Laterality Date    BLADDER SUSPENSION  2004     SECTION  08/31/1987    x1    CHOLECYSTECTOMY  2000    ENDOMETRIAL ABLATION  2007    HIATAL HERNIA REPAIR N/A 2021    LAPAROSCOPIC PARAESOPHAGEAL HIATAL HERNIA REPAIR, NISSEN FUNDOPLICATION performed by Consuelo Marks MD at [address] [place] [address]  2009    heavy menses    NASAL SEPTUM SURGERY  2005    NERVE SURGERY Bilateral 2019    BILATERAL L4-5 AND L5-S1 LUMBAR FACET INJECTIONS WITH FLUOROSCOPY performed by Le Yarbrough MD at [place] [address]         Medications:    Current Outpatient Medications   Medication Sig Dispense Refill    sulfaSALAzine (AZULFIDINE) 500 MG tablet Take 2 tablets by mouth 2 times daily 360 tablet 0    pilocarpine (SALAGEN) 5 MG tablet Take 1 tablet by mouth 3 times daily 176 tablet 0    folic acid (FOLVITE) 1 MG tablet Take 1 tablet by mouth daily 90 tablet 0    methotrexate (RHEUMATREX) 2.5 MG chemo tablet Take 5 tablets by mouth once a week 60 tablet 0    ustekinumab (STELARA) 45 MG/0.5ML SOSY prefilled syringe Start 45 mg SC x 1 on wk 0, 4 then Q12wk. 1 mL 0    busPIRone (BUSPAR) 5 MG tablet TAKE 1 TABLET BY MOUTH 3 TIMES DAILY AS NEEDED (ANXIETY) 90 tablet 3    DULoxetine (CYMBALTA) 30 MG extended release capsule Take 3 capsules by mouth daily 270 capsule 3    metoprolol tartrate (LOPRESSOR) 50 MG tablet TAKE 1 TABLET BY MOUTH TWICE A  tablet 3    olmesartan (BENICAR) 20 MG tablet TAKE 1/2 TABLET BY MOUTH EVERY DAY 45 tablet 2    amitriptyline (ELAVIL) 50 MG tablet TAKE 1 TABLET BY MOUTH NIGHTLY,MAY TAKE AN ADDITIONAL 50 MG AS NEEDED FOR INSOMNIA 180 tablet 1    albuterol sulfate HFA (PROVENTIL HFA) 108 (90 Base) MCG/ACT inhaler Inhale 2 puffs into the lungs every 4 hours as needed for Wheezing or Shortness of Breath 18 g 3    SUMAtriptan (IMITREX) 100 MG tablet Take 1 tablet by mouth once as needed for Migraine May repeat once after 2 hours if needed. No more than 2 per 24 hour period. 9 tablet 3    Cholecalciferol (VITAMIN D3) 2000 units CAPS Take by mouth       No current facility-administered medications for this visit.         OBJECTIVE:  Physical Exam:  BP 98/62   Pulse 68   Ht 5' 5\" (1.651 m)   Wt 195 lb (88.5 kg)   SpO2 95%   BMI 32.45 kg/m²     GEN: AAOx3, in NAD, well-appearing  HEAD: normocephalic, atraumatic  EYES: no injection or icterus  CVS: RRR  LUNGS: in no acute respiratory distress, CTAB  Upper extremities:              Hands: R MCP joints appear slightly puffy TTP improved since previous, b/l PIP joints w/o swelling slightly TTP, DIP joints w/o swelling NTTP, full fist formation w/ good  strength              Wrist: b/l wrist joints w/o swelling NTTP, FROM              Elbow: no synovitis or bursitis, b/l lateral epicondyles slightly TTP, FROM  Lower extremities:              Knees: no warmth or effusion present, FROM              Ankles: no synovitis, FROM, Achilles tendons w/o swelling or warmth NTTP              Feet: no toe swelling or pain or warmth on palpation w/ FROM, negative MTP squeeze test b/l  INTEGUMENT: no active psoriatic lesions, no nail pitting, no patchy alopecia, no other rash, petechiae, bruises, or palpable purpura, no clubbing or digital ulcers    DATA:  Labs:   I personally reviewed interval labs and discussed w/ the pt in detail which showed:    Lab Results   Component Value Date    WBC 2.5 (L) 05/04/2022    HGB 11.8 (L) 05/04/2022    HCT 36.2 05/04/2022    MCV 99.7 05/04/2022     05/04/2022    LYMPHOPCT 47.9 05/04/2022    RBC 3.63 (L) 05/04/2022    MCH 32.6 05/04/2022    MCHC 32.7 05/04/2022    RDW 15.5 (H) 05/04/2022     Lab Results   Component Value Date     06/18/2021    K 4.4 06/18/2021     06/18/2021    CO2 27 06/18/2021    BUN 12 06/18/2021    CREATININE 0.9 04/07/2022    GLUCOSE 125 (H) 06/18/2021    CALCIUM 9.1 06/18/2021    PROT 6.7 04/07/2022    LABALBU 4.6 04/07/2022    BILITOT 0.3 04/07/2022    ALKPHOS 110 04/07/2022    AST 16 04/07/2022    ALT 18 04/07/2022    LABGLOM >60 04/07/2022    GFRAA >60 04/07/2022    AGRATIO 2.6 (H) 09/25/2020    GLOB 1.7 09/25/2020     Lab Results   Component Value Date    COLORU YELLOW 12/22/2021    CLARITYU Clear 12/22/2021    GLUCOSEU Negative 12/22/2021    BILIRUBINUR SMALL (A) 12/22/2021    KETUA Negative 12/22/2021    SPECGRAV 1.019 12/22/2021    BLOODU Negative 12/22/2021    PHUR 6.5 12/22/2021    PROTEINU Negative 12/22/2021    UROBILINOGEN 0.2 12/22/2021    NITRU Negative 12/22/2021    LEUKOCYTESUR Negative 12/22/2021    LABMICR Not Indicated 12/22/2021    URINETYPE [employer]ancatch 12/22/2021    HYALCAST 1 12/22/2021    WBCUA 2 12/22/2021    RBCUA 4 12/22/2021    EPIU 1 12/22/2021     Lab Results   Component Value Date    VITD25 46.5 09/25/2020     Lab Results   Component Value Date    C3 136.3 02/25/2022    C3 141.3 07/24/2019     Lab Results   Component Value Date    C4 22.5 02/25/2022    C4 23.3 07/24/2019     No results found for: ANTIDSDNAIGG     No results found for: [facility] Lab Results   Component Value Date    CRP <3.0 04/07/2022    CRP <3.0 12/22/2021    CRP 4.1 11/03/2021    CRP <0.3 03/04/2020     Lab Results   Component Value Date    SEDRATE 10 03/04/2020    SEDRATE 7 07/24/2019     No results found for: CKTOTAL    Negative RF, CCP (7/24/19)  Negative HLA B27 (7/24/19)  Negative JULIANO x 3 (7/24/19, 3/4/20, 2/25/22)  Negative SSA, SSB (7/24/19)  Negative hepatitis B and C serologies (7/24/19)  Negative Quantiferon TB (4/7/22)    Imaging:  I personally reviewed interval imaging and discussed w/ the pt in detail which included:    MRI L-spine (7/10/19): Mild neural foraminal narrowing.  No significant spinal canal stenosis.       X-rays (7/24/19):  R hand:  No fracture or dislocation. No underlying degenerative changes. Joint spaces are normal with no significant osteophytes. No inflammatory changes. No erosions. No soft tissue swelling.      L hand:  No fracture or dislocation. No underlying degenerative changes. Joint spaces are normal with no significant osteophytes.  No inflammatory changes. No erosions. No soft tissue swelling.      R knee:  No fracture or dislocation. No underlying degenerative changes. Joint spaces are normal with no significant osteophytes.  No inflammatory changes. No erosions. No soft tissue swelling.      L knee:  No fracture or dislocation. No underlying degenerative changes. Joint spaces are normal with no significant osteophytes. No inflammatory changes. No erosions. No soft tissue swelling.      SI joints:  Normal, symmetric appearance of the b/l SI joints.  No widening or significant sclerosis.  No erosions are appreciated.  Pelvic bones are otherwise unremarkable.  No significant soft tissue findings.      I independently reviewed above X-rays - mild OA changes in the b/l PIP joints otherwise well preserved joint spaces, no significant juxta-articular osteopenia, no erosive changes seen.  Knees w/ mild medial joint space narrowing, no chondrocalcinosis.  SI joints patent w/o erosive changes, some sclerosis.     MRI R hand (9/3/19)  No erosive changes or synovitis.      MRI pelvis (9/3/19): No evidence of active sacroiliitis. Above results were discussed w/ the pt in detail during today's visit.

## 2022-05-09 ENCOUNTER — OFFICE VISIT (OUTPATIENT)
Dept: PSYCHOLOGY | Age: 57
End: 2022-05-09
Payer: COMMERCIAL

## 2022-05-09 DIAGNOSIS — F41.1 GAD (GENERALIZED ANXIETY DISORDER): ICD-10-CM

## 2022-05-09 DIAGNOSIS — F33.1 MODERATE EPISODE OF RECURRENT MAJOR DEPRESSIVE DISORDER (HCC): Primary | ICD-10-CM

## 2022-05-09 PROCEDURE — 90832 PSYTX W PT 30 MINUTES: CPT | Performed by: PSYCHOLOGIST

## 2022-05-09 NOTE — PATIENT INSTRUCTIONS
5/5/5 Grounding Exercise     What are 5 things you can see? What are 5 things you can feel? What are 5 things you can hear?    -Repeat with 4 different observations for each, 3 different for each, 2 different for each, etc.   -If you get to 1 and are still feeling anxious, re-start at 5 with 5 different things you can see.

## 2022-05-09 NOTE — PROGRESS NOTES
Behavioral Health Consultation  Jovi Amezquita, Ph.D.  Psychologist  5/9/2022  8:34 AM      Time spent with Patient: 24 minutes  This is patient's second  [facility] appointment. Reason for Consult:    Chief Complaint   Patient presents with    Depression       Feedback given to PCP. S:  Pt seen for f/u of depression, anxiety. Pt reported unchanged mood and sxs. Stated her memory has been poor, making mistakes at work. She had a arthritis flare that has made her feel more irritable. Reported 3 yrs of getting daydreaming reported on her report card. May be beneficial to assess for ADHD. Notably, pt lived in a high-trauma environment of weekly running away from stepfather.      O:  MSE:    Appearance    alert, cooperative  Appetite normal  Sleep disturbance Yes  Fatigue Yes  Loss of pleasure No  Impulsive behavior Yes  Speech    spontaneous, normal rate, normal volume and well articulated  Mood    Anxious  Affect    normal affect  Thought Content    intact  Thought Process    linear, goal directed and coherent  Associations    logical connections  Insight    Fair  Judgment    Intact  Orientation    oriented to person, place, time, and general circumstances  Memory    recent and remote memory intact  Attention/Concentration    impaired  Morbid ideation No  Suicide Assessment    no suicidal ideation    History:  Social History:   Social History     Socioeconomic History    Marital status:      Spouse name: Not on file    Number of children: Not on file    Years of education: Not on file    Highest education level: Not on file   Occupational History    Not on file   Tobacco Use    Smoking status: Never Smoker    Smokeless tobacco: Never Used   Vaping Use    Vaping Use: Never used   Substance and Sexual Activity    Alcohol use: Yes     Comment: every month or so    Drug use: Yes     Types: Marijuana [place])     Comment: last 2019    Sexual activity: Not on file   Other Topics Concern    Not on file Social History Narrative    Not on file     Social Determinants of Health     Financial Resource Strain: Low Risk     Difficulty of Paying Living Expenses: Not hard at all   Food Insecurity: No Food Insecurity    Worried About Running Out of Food in the Last Year: Never true    [address] in the Last Year: Never true   Transportation Needs:     Lack of Transportation (Medical): Not on file    Lack of Transportation (Non-Medical): Not on file   Physical Activity:     Days of Exercise per Week: Not on file    Minutes of Exercise per Session: Not on file   Stress:     Feeling of Stress : Not on file   Social Connections:     Frequency of Communication with Friends and Family: Not on file    Frequency of Social Gatherings with Friends and Family: Not on file    Attends Tenriism Services: Not on file    Active Member of [address] [place] [address] or Organizations: Not on file    Attends Club or Organization Meetings: Not on file    Marital Status: Not on file   Intimate Partner Violence:     Fear of Current or Ex-Partner: Not on file    Emotionally Abused: Not on file    Physically Abused: Not on file    Sexually Abused: Not on file   Housing Stability:     Unable to Pay for Housing in the Last Year: Not on file    Number of [place] in the Last Year: Not on file    Unstable Housing in the Last Year: Not on file     TOBACCO:   reports that she has never smoked. She has never used smokeless tobacco.  ETOH:   reports current alcohol use. Diagnosis:  1. Moderate episode of recurrent major depressive disorder ([facility] [address]    2. FRANCISCA (generalized anxiety disorder)          Plan:  Pt interventions:  Supportive techniques, Provided Psychoeducation re: grounding and mindfulness and Identified maladaptive thoughts        Documentation was done using voice recognition dragon software. Every effort was made to ensure accuracy; however, inadvertent, unintentional computerized transcription errors may be present.

## 2022-05-10 ENCOUNTER — OFFICE VISIT (OUTPATIENT)
Dept: RHEUMATOLOGY | Age: 57
End: 2022-05-10
Payer: COMMERCIAL

## 2022-05-10 VITALS
HEART RATE: 68 BPM | DIASTOLIC BLOOD PRESSURE: 62 MMHG | BODY MASS INDEX: 32.49 KG/M2 | WEIGHT: 195 LBS | SYSTOLIC BLOOD PRESSURE: 98 MMHG | HEIGHT: 65 IN | OXYGEN SATURATION: 95 %

## 2022-05-10 DIAGNOSIS — Z79.899 HIGH RISK MEDICATION USE: ICD-10-CM

## 2022-05-10 DIAGNOSIS — D72.819 CHRONIC LEUKOPENIA: ICD-10-CM

## 2022-05-10 DIAGNOSIS — L40.9 PSORIASIS: ICD-10-CM

## 2022-05-10 DIAGNOSIS — M35.00 SJOGREN SYNDROME, UNSPECIFIED (HCC): ICD-10-CM

## 2022-05-10 DIAGNOSIS — L40.50 PSORIATIC ARTHRITIS (HCC): Primary | ICD-10-CM

## 2022-05-10 PROCEDURE — 99214 OFFICE O/P EST MOD 30 MIN: CPT | Performed by: INTERNAL MEDICINE

## 2022-05-10 RX ORDER — FOLIC ACID 1 MG/1
1 TABLET ORAL DAILY
Qty: 90 TABLET | Refills: 0 | Status: SHIPPED | OUTPATIENT
Start: 2022-05-10 | End: 2022-07-13 | Stop reason: SDUPTHER

## 2022-05-10 RX ORDER — SULFASALAZINE 500 MG/1
1000 TABLET ORAL 2 TIMES DAILY
Qty: 360 TABLET | Refills: 0 | Status: SHIPPED | OUTPATIENT
Start: 2022-05-10 | End: 2022-07-13 | Stop reason: SDUPTHER

## 2022-05-10 RX ORDER — METHOTREXATE 2.5 MG/1
12.5 TABLET ORAL WEEKLY
Qty: 60 TABLET | Refills: 0 | Status: SHIPPED | OUTPATIENT
Start: 2022-05-10 | End: 2022-07-13 | Stop reason: SDUPTHER

## 2022-05-10 RX ORDER — PILOCARPINE HYDROCHLORIDE 5 MG/1
5 TABLET, FILM COATED ORAL 3 TIMES DAILY
Qty: 270 TABLET | Refills: 0 | Status: SHIPPED | OUTPATIENT
Start: 2022-05-10 | End: 2022-07-13 | Stop reason: SDUPTHER

## 2022-05-10 NOTE — ASSESSMENT & PLAN NOTE
- objectively, her inflammatory arthritis has improved since increasing MTX dose to 17.5 mg PO wkly. Unfortunately, she has developed worsening, asymptomatic leukopenia on high dose MTX and SSZ.  - she held her MTX dose this Monday, she will cont to hold MTX until she repeats her CBC next wk. Discussed resuming MTX at lower dose of 12.5 mg PO wkly if repeat CBC shows improved WBC. Cont SSZ 1000 mg BID for now. - pt previously saw response to Ustekinumab for both her PsA and IBS. She requested to resume this, submit PA for Ustekinumab (start 45 mg SC x 1 on wk 0, 4 then Q12wk) now.  Denied Hx of diverticulitis or any other GI Hx.

## 2022-05-10 NOTE — ASSESSMENT & PLAN NOTE
- discussed her worsening leukopenia on increased dose of MTX, she remains asymptomatic. Suspect this is likely medication induced. - she will repeat her CBC next wk and will plan on resuming lower dose of MTX if repeat WBC is improved. For now she will cont same dose of SSZ.

## 2022-05-10 NOTE — ASSESSMENT & PLAN NOTE
- discussed s/e and risks of Ustekinumab and indications to hold her immunosuppression.  - safety labs for MTX and SSZ Q3mo. Refer to below for worsening leukopenia.

## 2022-05-11 ENCOUNTER — TELEPHONE (OUTPATIENT)
Dept: ADMINISTRATIVE | Age: 57
End: 2022-05-11

## 2022-05-17 ENCOUNTER — OFFICE VISIT (OUTPATIENT)
Dept: INTERNAL MEDICINE CLINIC | Age: 57
End: 2022-05-17
Payer: COMMERCIAL

## 2022-05-17 ENCOUNTER — PATIENT MESSAGE (OUTPATIENT)
Dept: RHEUMATOLOGY | Age: 57
End: 2022-05-17

## 2022-05-17 VITALS
HEIGHT: 65 IN | HEART RATE: 67 BPM | BODY MASS INDEX: 32.76 KG/M2 | OXYGEN SATURATION: 98 % | DIASTOLIC BLOOD PRESSURE: 80 MMHG | SYSTOLIC BLOOD PRESSURE: 112 MMHG | WEIGHT: 196.6 LBS

## 2022-05-17 DIAGNOSIS — H57.11 EYE PAIN, RIGHT: ICD-10-CM

## 2022-05-17 DIAGNOSIS — B00.89 HERPETIC DERMATITIS: Primary | ICD-10-CM

## 2022-05-17 PROCEDURE — 99213 OFFICE O/P EST LOW 20 MIN: CPT | Performed by: INTERNAL MEDICINE

## 2022-05-17 RX ORDER — ACYCLOVIR 800 MG/1
800 TABLET ORAL 2 TIMES DAILY
Qty: 20 TABLET | Refills: 0 | Status: SHIPPED | OUTPATIENT
Start: 2022-05-17 | End: 2022-05-27 | Stop reason: ALTCHOICE

## 2022-05-17 ASSESSMENT — ENCOUNTER SYMPTOMS
VOMITING: 0
COLOR CHANGE: 0
EYE PAIN: 1
COUGH: 0
NAUSEA: 0
CHEST TIGHTNESS: 0
SORE THROAT: 0
CONSTIPATION: 0
WHEEZING: 0
SHORTNESS OF BREATH: 0
ABDOMINAL PAIN: 0
BACK PAIN: 0
PHOTOPHOBIA: 1

## 2022-05-17 NOTE — PROGRESS NOTES
ASSESSMENT/PLAN:  1. Herpetic dermatitis  Assessment & Plan: We will start systemic acyclovir, advised can use topical lidocaine or hydrocortisone cream can also try calamine lotion, can use hydroxyzine as needed for the itching since she has leftover prescription at home. Encouraged to call the office if any new or worsening symptoms, advised may notice new eruptions which is not uncommon  Orders:  -     acyclovir (ZOVIRAX) 800 MG tablet; Take 1 tablet by mouth 2 times daily for 10 days, Disp-20 tablet, R-0Normal  2. Eye pain, right  Assessment & Plan:   Unlikely related to ongoing diagnosis of herpes zoster since affecting contralateral side however advised patient recommend ophthalmology evaluation to rule out acute glaucoma given symptoms of pain and light sensitivity. There is no actual change in vision but still recommend ophthalmology evaluation. Return if symptoms worsen or fail to improve. SUBJECTIVE  HPI:   Patient complaining of painful itchy rash back of the neck on the left side that started last night she also started complaining of pain and sensitivity of the right eye, she is not comfortable opening the eye and noticed the light and air worsening the sensitivity. At the same time she started having burning sensation at the tip of her tongue  Does have known history of Sjogren's syndrome and autoimmune psoriatic arthropathy, she has been on methotrexate and sulfasalazine under care of rheumatology, cetraxate has been on hold for the past 2 weeks as the plan is to start her on Stelara infusion. Review of Systems   Constitutional: Negative for activity change, appetite change and fatigue. HENT: Negative for congestion, hearing loss, mouth sores and sore throat. Eyes: Positive for photophobia and pain. Respiratory: Negative for cough, chest tightness, shortness of breath and wheezing. Cardiovascular: Negative for chest pain, palpitations and leg swelling.    Gastrointestinal: Negative for abdominal pain, constipation, nausea and vomiting. Genitourinary: Negative for difficulty urinating, dysuria, frequency, hematuria and urgency. Musculoskeletal: Positive for arthralgias. Negative for back pain, gait problem and joint swelling. Skin: Positive for rash. Negative for color change. Allergic/Immunologic: Negative for environmental allergies and immunocompromised state. Neurological: Negative for dizziness, light-headedness and headaches. Psychiatric/Behavioral: Negative for behavioral problems and dysphoric mood. OBJECTIVE:    /80   Pulse 67   Ht 5' 5\" (1.651 m)   Wt 196 lb 9.6 oz (89.2 kg)   SpO2 98%   BMI 32.72 kg/m²    Physical Exam  Constitutional:       Appearance: Normal appearance. HENT:      Head: Normocephalic. Mouth/Throat:      Mouth: Mucous membranes are dry. Pharynx: Oropharynx is clear. No oropharyngeal exudate or posterior oropharyngeal erythema. Eyes:      General:         Right eye: No discharge. Conjunctiva/sclera: Conjunctivae normal.      Pupils: Pupils are equal, round, and reactive to light. Comments: Patient mostly having care right eyes shut due to sensitivity to light and discomfort however no discharge or excessive tearing noted   Skin:     General: Skin is warm and dry. Findings: Rash present. Comments: Crops of vesicular rash left side back of the neck consistent with herpes zoster dermatitis   Neurological:      Mental Status: She is alert. Electronically signed by Sachin De Santiago MD on 5/17/2022 at 4:25 PM.    This dictation was generated by voice recognition computer software. Although all attempts are made to edit the dictation for accuracy, there may be errors in the transcription that are not intended.

## 2022-05-17 NOTE — ASSESSMENT & PLAN NOTE
Unlikely related to ongoing diagnosis of herpes zoster since affecting contralateral side however advised patient recommend ophthalmology evaluation to rule out acute glaucoma given symptoms of pain and light sensitivity. There is no actual change in vision but still recommend ophthalmology evaluation.

## 2022-05-17 NOTE — ASSESSMENT & PLAN NOTE
We will start systemic acyclovir, advised can use topical lidocaine or hydrocortisone cream can also try calamine lotion, can use hydroxyzine as needed for the itching since she has leftover prescription at home.   Encouraged to call the office if any new or worsening symptoms, advised may notice new eruptions which is not uncommon

## 2022-05-18 NOTE — TELEPHONE ENCOUNTER
From: Vilma Becker  To: Dr. Danielle Phan: 5/17/2022 6:15 PM EDT  Subject: Shingles Med Valtrex    Dr Miroslava Gaytan I saw Dr Myla Gutierrez this afternoon and she diagnosed Shingles. She gave me a prescription for Zovirax. I planned on getting blood work done tomorrow for you to check my cbc. Im delaying starting the Zovirax until I have blood drawn. If you get this message this evening can you tell me if I should start the prescription today and get blood work later or hold off until tomorrow after blood draw?  Thank you

## 2022-05-25 DIAGNOSIS — Z79.899 HIGH RISK MEDICATION USE: ICD-10-CM

## 2022-05-25 DIAGNOSIS — D72.819 CHRONIC LEUKOPENIA: ICD-10-CM

## 2022-05-25 LAB
BASOPHILS ABSOLUTE: 0 K/UL (ref 0–0.2)
BASOPHILS RELATIVE PERCENT: 0.7 %
EOSINOPHILS ABSOLUTE: 0.2 K/UL (ref 0–0.6)
EOSINOPHILS RELATIVE PERCENT: 4.8 %
HCT VFR BLD CALC: 37.1 % (ref 36–48)
HEMOGLOBIN: 12.5 G/DL (ref 12–16)
LYMPHOCYTES ABSOLUTE: 1.3 K/UL (ref 1–5.1)
LYMPHOCYTES RELATIVE PERCENT: 34.9 %
MCH RBC QN AUTO: 33.6 PG (ref 26–34)
MCHC RBC AUTO-ENTMCNC: 33.8 G/DL (ref 31–36)
MCV RBC AUTO: 99.4 FL (ref 80–100)
MONOCYTES ABSOLUTE: 0.4 K/UL (ref 0–1.3)
MONOCYTES RELATIVE PERCENT: 9.5 %
NEUTROPHILS ABSOLUTE: 1.9 K/UL (ref 1.7–7.7)
NEUTROPHILS RELATIVE PERCENT: 50.1 %
PDW BLD-RTO: 14.6 % (ref 12.4–15.4)
PLATELET # BLD: 352 K/UL (ref 135–450)
PMV BLD AUTO: 7.7 FL (ref 5–10.5)
PROTEIN PROTEIN: 0.02 G/DL
PROTEIN PROTEIN: 22 MG/DL
RBC # BLD: 3.73 M/UL (ref 4–5.2)
WBC # BLD: 3.8 K/UL (ref 4–11)

## 2022-05-26 NOTE — RESULT ENCOUNTER NOTE
WBC has significantly improved. As we discussed, she can resume MTX at lower dose of 5 tabs/wk. We will repeat her CBC at her next f/u visit.

## 2022-05-27 ENCOUNTER — OFFICE VISIT (OUTPATIENT)
Dept: INTERNAL MEDICINE CLINIC | Age: 57
End: 2022-05-27
Payer: COMMERCIAL

## 2022-05-27 ENCOUNTER — TELEPHONE (OUTPATIENT)
Dept: CARDIOTHORACIC SURGERY | Age: 57
End: 2022-05-27

## 2022-05-27 ENCOUNTER — TELEPHONE (OUTPATIENT)
Dept: RHEUMATOLOGY | Age: 57
End: 2022-05-27

## 2022-05-27 VITALS
SYSTOLIC BLOOD PRESSURE: 110 MMHG | DIASTOLIC BLOOD PRESSURE: 80 MMHG | OXYGEN SATURATION: 99 % | WEIGHT: 193.8 LBS | BODY MASS INDEX: 32.25 KG/M2 | HEART RATE: 76 BPM

## 2022-05-27 DIAGNOSIS — R21 RASH: Primary | ICD-10-CM

## 2022-05-27 DIAGNOSIS — H57.11 EYE PAIN, RIGHT: ICD-10-CM

## 2022-05-27 DIAGNOSIS — Z63.8 STRESS DUE TO FAMILY TENSION: ICD-10-CM

## 2022-05-27 LAB
ALBUMIN SERPL-MCNC: 3.6 G/DL (ref 3.1–4.9)
ALPHA-1-GLOBULIN: 0.2 G/DL (ref 0.2–0.4)
ALPHA-2-GLOBULIN: 0.7 G/DL (ref 0.4–1.1)
BETA GLOBULIN: 1.1 G/DL (ref 0.9–1.6)
GAMMA GLOBULIN: 0.9 G/DL (ref 0.6–1.8)
SPE/IFE INTERPRETATION: NORMAL
TOTAL PROTEIN: 6.4 G/DL (ref 6.4–8.2)
URINE ELECTROPHORESIS INTERP: NORMAL

## 2022-05-27 PROCEDURE — 99213 OFFICE O/P EST LOW 20 MIN: CPT | Performed by: INTERNAL MEDICINE

## 2022-05-27 SDOH — SOCIAL STABILITY - SOCIAL INSECURITY: OTHER SPECIFIED PROBLEMS RELATED TO PRIMARY SUPPORT GROUP: Z63.8

## 2022-05-27 ASSESSMENT — PATIENT HEALTH QUESTIONNAIRE - PHQ9
SUM OF ALL RESPONSES TO PHQ QUESTIONS 1-9: 0
10. IF YOU CHECKED OFF ANY PROBLEMS, HOW DIFFICULT HAVE THESE PROBLEMS MADE IT FOR YOU TO DO YOUR WORK, TAKE CARE OF THINGS AT HOME, OR GET ALONG WITH OTHER PEOPLE: 0
5. POOR APPETITE OR OVEREATING: 0
SUM OF ALL RESPONSES TO PHQ QUESTIONS 1-9: 0
7. TROUBLE CONCENTRATING ON THINGS, SUCH AS READING THE NEWSPAPER OR WATCHING TELEVISION: 0
SUM OF ALL RESPONSES TO PHQ QUESTIONS 1-9: 0
4. FEELING TIRED OR HAVING LITTLE ENERGY: 0
1. LITTLE INTEREST OR PLEASURE IN DOING THINGS: 0
6. FEELING BAD ABOUT YOURSELF - OR THAT YOU ARE A FAILURE OR HAVE LET YOURSELF OR YOUR FAMILY DOWN: 0
8. MOVING OR SPEAKING SO SLOWLY THAT OTHER PEOPLE COULD HAVE NOTICED. OR THE OPPOSITE, BEING SO FIGETY OR RESTLESS THAT YOU HAVE BEEN MOVING AROUND A LOT MORE THAN USUAL: 0
SUM OF ALL RESPONSES TO PHQ9 QUESTIONS 1 & 2: 0
3. TROUBLE FALLING OR STAYING ASLEEP: 0
SUM OF ALL RESPONSES TO PHQ QUESTIONS 1-9: 0
2. FEELING DOWN, DEPRESSED OR HOPELESS: 0
9. THOUGHTS THAT YOU WOULD BE BETTER OFF DEAD, OR OF HURTING YOURSELF: 0

## 2022-05-27 NOTE — PATIENT INSTRUCTIONS
Take Zyrtec 10 mg nightly x 2 weeks to help with itching. If you are still itching, let me know and I will have you use Prednisone.

## 2022-05-27 NOTE — TELEPHONE ENCOUNTER
Yes, she should cont SSZ. I ordered Stelara on 5/10/22. Both SSZ and Stelara orders are active on her med list, where are you seeing them being canceled? Please f/u on Angeline UMANZOR.

## 2022-05-27 NOTE — PROGRESS NOTES
Patient: Licha Yan is a 62 y.o. female who presents today with the following Chief Complaint(s):  Chief Complaint   Patient presents with    Rash       HPI     Here today for follow up. Saw Dr. Sandra Diaz last week and dx with Shingles. Was along her neck, her hair line, the tip of her nose, hands. Also had rash on her eyes. Started on Acyclovir. Is wondering if she may have actually had poison sumac as rash was itchy and not painful. Is wondering about getting a steroid injection. Has taken a few antihistamines (hydroxyzine) which have helped. Reviewed pictures from patient who will send to [employer]. Some rashes pictures are more vesicular others look like urticaria. Did see ophthalmology and told not Shingles but bacterial infection and started on antibiotic eye drops and was re-evaluated yesterday with clearing of infection and drops decreased from QID to BID. Has had 1 Shingrix. Patient has had increased family stress-her daughter, with history of drug addiction, continues to struggle with relapses. She has been seeing Dr. Karina Spence and is doing better. Allergies   Allergen Reactions    Norco [Hydrocodone-Acetaminophen] Other (See Comments)     Bad headache      Past Medical History:   Diagnosis Date    Chronic depression     DDD (degenerative disc disease), lumbar     Fatty liver     MRI 3/2021 mild    Fibromyalgia     Hiatal hernia     HTN (hypertension)     Obesity (BMI 30.0-34. 9)     BMI 32.68     KARTHIK (obstructive sleep apnea)     has mask, doesn't wear it    Pneumonia     in her 35s had pna B 7 yrs in a row    Psoriatic arthritis [facility])       Past Surgical History:   Procedure Laterality Date    BLADDER SUSPENSION  2004     SECTION  08/31/1987    x1    CHOLECYSTECTOMY  2000    ENDOMETRIAL ABLATION  2007    HIATAL HERNIA REPAIR N/A 2021    LAPAROSCOPIC PARAESOPHAGEAL HIATAL HERNIA REPAIR, NISSEN FUNDOPLICATION performed by Billie Doan, MD at [facility] [address], TOTAL ABDOMINAL  2009    heavy menses    NASAL SEPTUM SURGERY  2005    NERVE SURGERY Bilateral 11/6/2019    BILATERAL L4-5 AND L5-S1 LUMBAR FACET INJECTIONS WITH FLUOROSCOPY performed by Vinicio Reyes MD at [place] [id number]      Social History     Socioeconomic History    Marital status:      Spouse name: Not on file    Number of children: Not on file    Years of education: Not on file    Highest education level: Not on file   Occupational History    Not on file   Tobacco Use    Smoking status: Never Smoker    Smokeless tobacco: Never Used   Vaping Use    Vaping Use: Never used   Substance and Sexual Activity    Alcohol use: Yes     Comment: every month or so    Drug use: Yes     Types: Marijuana Lorna Dasen)     Comment: last 2019    Sexual activity: Not on file   Other Topics Concern    Not on file   Social History Narrative    Not on file     Social Determinants of Health     Financial Resource Strain: Low Risk     Difficulty of Paying Living Expenses: Not hard at all   Food Insecurity: No Food Insecurity    Worried About [address] in the Last Year: Never true    [facility] [facility] [address] in the Last Year: Never true   Transportation Needs:     Lack of Transportation (Medical): Not on file    Lack of Transportation (Non-Medical):  Not on file   Physical Activity:     Days of Exercise per Week: Not on file    Minutes of Exercise per Session: Not on file   Stress:     Feeling of Stress : Not on file   Social Connections:     Frequency of Communication with Friends and Family: Not on file    Frequency of Social Gatherings with Friends and Family: Not on file    Attends Rastafari Services: Not on file    Active Member of Clubs or Organizations: Not on file    Attends Club or Organization Meetings: Not on file    Marital Status: Not on file   Intimate Partner Violence:     Fear of Current or Ex-Partner: Not on file   [employer] Abused: Not on file    Physically Abused: Not on file    Sexually Abused: Not on file   Housing Stability:     Unable to Pay for Housing in the Last Year: Not on file    Number of Places Lived in the Last Year: Not on file    Unstable Housing in the Last Year: Not on file     Family History   Problem Relation Age of Onset    Cancer Mother         gallbladder cancer    High Blood Pressure Mother     Alcohol Abuse Mother     High Blood Pressure Father     Cancer Sister         small cell lung cancer (smoker)    Heart Disease Maternal Grandmother     Heart Disease Maternal Grandfather     Stroke Maternal Grandfather     Heart Disease Paternal Grandmother     Heart Disease Paternal Grandfather     No Known Problems Half-Brother     No Known Problems Half-Sister     Other Half-Sibling         skin cancer    Other Half-Sister         blood clots    Substance Abuse Daughter 32        in remission         Outpatient Medications Prior to Visit   Medication Sig Dispense Refill    sulfaSALAzine (AZULFIDINE) 500 MG tablet Take 2 tablets by mouth 2 times daily 360 tablet 0    pilocarpine (SALAGEN) 5 MG tablet Take 1 tablet by mouth 3 times daily 984 tablet 0    folic acid (FOLVITE) 1 MG tablet Take 1 tablet by mouth daily 90 tablet 0    methotrexate (RHEUMATREX) 2.5 MG chemo tablet Take 5 tablets by mouth once a week 60 tablet 0    ustekinumab (STELARA) 45 MG/0.5ML SOSY prefilled syringe Start 45 mg SC x 1 on wk 0, 4 then Q12wk.  1 mL 0    busPIRone (BUSPAR) 5 MG tablet TAKE 1 TABLET BY MOUTH 3 TIMES DAILY AS NEEDED (ANXIETY) 90 tablet 3    DULoxetine (CYMBALTA) 30 MG extended release capsule Take 3 capsules by mouth daily 270 capsule 3    metoprolol tartrate (LOPRESSOR) 50 MG tablet TAKE 1 TABLET BY MOUTH TWICE A  tablet 3    olmesartan (BENICAR) 20 MG tablet TAKE 1/2 TABLET BY MOUTH EVERY DAY 45 tablet 2    amitriptyline (ELAVIL) 50 MG tablet TAKE 1 TABLET BY MOUTH NIGHTLY,MAY TAKE AN ADDITIONAL 50 MG AS NEEDED FOR INSOMNIA 180 tablet 1    albuterol sulfate HFA (PROVENTIL HFA) 108 (90 Base) MCG/ACT inhaler Inhale 2 puffs into the lungs every 4 hours as needed for Wheezing or Shortness of Breath 18 g 3    SUMAtriptan (IMITREX) 100 MG tablet Take 1 tablet by mouth once as needed for Migraine May repeat once after 2 hours if needed. No more than 2 per 24 hour period. 9 tablet 3    Cholecalciferol (VITAMIN D3) 2000 units CAPS Take by mouth      acyclovir (ZOVIRAX) 800 MG tablet Take 1 tablet by mouth 2 times daily for 10 days 20 tablet 0     No facility-administered medications prior to visit. Patient'spast medical history, surgical history, family history, medications,  and allergies  were all reviewed and updated as appropriate today. Review of Systems    /80   Pulse 76   Wt 193 lb 12.8 oz (87.9 kg)   SpO2 99%   BMI 32.25 kg/m²   Physical Exam  Constitutional:       Appearance: Normal appearance. She is normal weight. She is not ill-appearing. Skin:     Findings: Rash present. Comments: 2 small bumps on both hands. Urticarial rash on abdomen. Urticarial rash on posterior neck. No evidence of crusting lesions. No scarring. Neurological:      Mental Status: She is alert. ASSESSMENT/PLAN:    Problem List Items Addressed This Visit     Eye pain, right      Secondary to bacterial infection. Has been following with ophthalmology. Improved with antibiotic drops. No evidence of zoster. Rash - Primary     Disseminated zoster versus urticaria versus poison sumac. Reviewed pictures that patient took and asked her to forward to [address] Patient is completing acyclovir but is still having some new spots pop up that are itchy. She does report recent yardwork with exposure to poison sumac. Add Zyrtec 10 mg nightly. Would hold off on prednisone in case she does have disseminated zoster, though unlikely.   She is at risk for disseminated zoster given immunocompromise status. Stress due to family tension     Continues to follow with Dr. Macarena Poole. Doing better. Current Outpatient Medications   Medication Sig Dispense Refill    sulfaSALAzine (AZULFIDINE) 500 MG tablet Take 2 tablets by mouth 2 times daily 360 tablet 0    pilocarpine (SALAGEN) 5 MG tablet Take 1 tablet by mouth 3 times daily 111 tablet 0    folic acid (FOLVITE) 1 MG tablet Take 1 tablet by mouth daily 90 tablet 0    methotrexate (RHEUMATREX) 2.5 MG chemo tablet Take 5 tablets by mouth once a week 60 tablet 0    ustekinumab (STELARA) 45 MG/0.5ML SOSY prefilled syringe Start 45 mg SC x 1 on wk 0, 4 then Q12wk. 1 mL 0    busPIRone (BUSPAR) 5 MG tablet TAKE 1 TABLET BY MOUTH 3 TIMES DAILY AS NEEDED (ANXIETY) 90 tablet 3    DULoxetine (CYMBALTA) 30 MG extended release capsule Take 3 capsules by mouth daily 270 capsule 3    metoprolol tartrate (LOPRESSOR) 50 MG tablet TAKE 1 TABLET BY MOUTH TWICE A  tablet 3    olmesartan (BENICAR) 20 MG tablet TAKE 1/2 TABLET BY MOUTH EVERY DAY 45 tablet 2    amitriptyline (ELAVIL) 50 MG tablet TAKE 1 TABLET BY MOUTH NIGHTLY,MAY TAKE AN ADDITIONAL 50 MG AS NEEDED FOR INSOMNIA 180 tablet 1    albuterol sulfate HFA (PROVENTIL HFA) 108 (90 Base) MCG/ACT inhaler Inhale 2 puffs into the lungs every 4 hours as needed for Wheezing or Shortness of Breath 18 g 3    SUMAtriptan (IMITREX) 100 MG tablet Take 1 tablet by mouth once as needed for Migraine May repeat once after 2 hours if needed. No more than 2 per 24 hour period. 9 tablet 3    Cholecalciferol (VITAMIN D3) 2000 units CAPS Take by mouth       No current facility-administered medications for this visit. No follow-ups on file.

## 2022-05-27 NOTE — TELEPHONE ENCOUNTER
Patient is in surveillance for pericardial cyst.  Dr. Guerra Self reviewed CT chest w/o contrast performed on 5/6/22. She would like to repeat the same study in 1 year. Patient is aware and agreeable with plan.

## 2022-05-27 NOTE — TELEPHONE ENCOUNTER
----- Message from [employer] sent at 5/26/2022  3:30 PM EDT -----  Called spoke with patient gave message states she will start back on her MTX, she was asking if she should go back on SSZ and Stelara injection-this was shown as cancelled. Not sure why.

## 2022-05-28 NOTE — ASSESSMENT & PLAN NOTE
Secondary to bacterial infection. Has been following with ophthalmology. Improved with antibiotic drops. No evidence of zoster.

## 2022-05-28 NOTE — ASSESSMENT & PLAN NOTE
Disseminated zoster versus urticaria versus poison sumac. Reviewed pictures that patient took and asked her to forward to [address] Patient is completing acyclovir but is still having some new spots pop up that are itchy. She does report recent yardwork with exposure to poison sumac. Add Zyrtec 10 mg nightly. Would hold off on prednisone in case she does have disseminated zoster, though unlikely. She is at risk for disseminated zoster given immunocompromise status.

## 2022-05-31 NOTE — TELEPHONE ENCOUNTER
Called spoke with patient-she is healed up and she didn't have shingles it was poison sumac, she is finished with her antibiotics. She will start back on SSZ and does she still stay on MTX 5 tablets weekly. Patient states the Stelara was [id number] Rule said due to a pre-existing condition.

## 2022-05-31 NOTE — TELEPHONE ENCOUNTER
I don't see an actual denial letter in her chart, please look into this and appeal if Cee Garcia has been denied.

## 2022-05-31 NOTE — TELEPHONE ENCOUNTER
PA for   Stelara 45mg/0.5ml prefilled syringe  Start 45mg SQ x 1 on wk, 0, 4 then Q12wks    DX: L40.50  L40.9    Please obtain a PA and VOB

## 2022-06-01 NOTE — TELEPHONE ENCOUNTER
The PA for Stelara was sent to [facility]. In [facility] the last notation was 5/18/2022 and says pending. I have attached that letter from [facility]. I have also sent a Email to Kaity Lombardi , asking him to check on this PA.     per Kaity Lombardi with [facility]; It is still pending. INS told us today they received the PA 5/20 but it could take 4-6 weeks to review. Thats absolutely INSANE but not much we can do about it other than keep calling to check status!

## 2022-06-13 ENCOUNTER — OFFICE VISIT (OUTPATIENT)
Dept: PSYCHOLOGY | Age: 57
End: 2022-06-13
Payer: COMMERCIAL

## 2022-06-13 DIAGNOSIS — L40.50 PSORIATIC ARTHRITIS (HCC): ICD-10-CM

## 2022-06-13 DIAGNOSIS — F41.1 GAD (GENERALIZED ANXIETY DISORDER): ICD-10-CM

## 2022-06-13 DIAGNOSIS — F33.1 MODERATE EPISODE OF RECURRENT MAJOR DEPRESSIVE DISORDER (HCC): Primary | ICD-10-CM

## 2022-06-13 PROCEDURE — 90832 PSYTX W PT 30 MINUTES: CPT | Performed by: PSYCHOLOGIST

## 2022-06-13 RX ORDER — SULFASALAZINE 500 MG/1
1000 TABLET ORAL 2 TIMES DAILY
Qty: 360 TABLET | Refills: 0 | Status: CANCELLED | OUTPATIENT
Start: 2022-06-13 | End: 2022-09-11

## 2022-06-13 ASSESSMENT — PATIENT HEALTH QUESTIONNAIRE - PHQ9
8. MOVING OR SPEAKING SO SLOWLY THAT OTHER PEOPLE COULD HAVE NOTICED. OR THE OPPOSITE, BEING SO FIGETY OR RESTLESS THAT YOU HAVE BEEN MOVING AROUND A LOT MORE THAN USUAL: 0
4. FEELING TIRED OR HAVING LITTLE ENERGY: 1
1. LITTLE INTEREST OR PLEASURE IN DOING THINGS: 1
7. TROUBLE CONCENTRATING ON THINGS, SUCH AS READING THE NEWSPAPER OR WATCHING TELEVISION: 1
SUM OF ALL RESPONSES TO PHQ QUESTIONS 1-9: 7
SUM OF ALL RESPONSES TO PHQ QUESTIONS 1-9: 7
9. THOUGHTS THAT YOU WOULD BE BETTER OFF DEAD, OR OF HURTING YOURSELF: 0
6. FEELING BAD ABOUT YOURSELF - OR THAT YOU ARE A FAILURE OR HAVE LET YOURSELF OR YOUR FAMILY DOWN: 0
3. TROUBLE FALLING OR STAYING ASLEEP: 1
SUM OF ALL RESPONSES TO PHQ QUESTIONS 1-9: 7
SUM OF ALL RESPONSES TO PHQ9 QUESTIONS 1 & 2: 3
2. FEELING DOWN, DEPRESSED OR HOPELESS: 2
5. POOR APPETITE OR OVEREATING: 1
10. IF YOU CHECKED OFF ANY PROBLEMS, HOW DIFFICULT HAVE THESE PROBLEMS MADE IT FOR YOU TO DO YOUR WORK, TAKE CARE OF THINGS AT HOME, OR GET ALONG WITH OTHER PEOPLE: 1
SUM OF ALL RESPONSES TO PHQ QUESTIONS 1-9: 7

## 2022-06-13 NOTE — PATIENT INSTRUCTIONS
- You need to see a consistent effort from her to pay you back  - You cannot help her financially anymore and you'd prefer her not mention her money troubles to you  - you want to have a good relationship with her, but the only way that can happen is if money is removed from the relationship.

## 2022-06-13 NOTE — PROGRESS NOTES
Types: Marijuana Darryle Antonellaagustín)     Comment: last 2019    Sexual activity: Not on file   Other Topics Concern    Not on file   Social History Narrative    Not on file     Social Determinants of Health     Financial Resource Strain: Low Risk     Difficulty of Paying Living Expenses: Not hard at all   Food Insecurity: No Food Insecurity    Worried About Running Out of Food in the Last Year: Never true    [address] in the Last Year: Never true   Transportation Needs:     Lack of Transportation (Medical): Not on file    Lack of Transportation (Non-Medical): Not on file   Physical Activity:     Days of Exercise per Week: Not on file    Minutes of Exercise per Session: Not on file   Stress:     Feeling of Stress : Not on file   Social Connections:     Frequency of Communication with Friends and Family: Not on file    Frequency of Social Gatherings with Friends and Family: Not on file    Attends Mosque Services: Not on file    Active Member of [address] [place] [address] or Organizations: Not on file    Attends Club or Organization Meetings: Not on file    Marital Status: Not on file   Intimate Partner Violence:     Fear of Current or Ex-Partner: Not on file    Emotionally Abused: Not on file    Physically Abused: Not on file    Sexually Abused: Not on file   Housing Stability:     Unable to Pay for Housing in the Last Year: Not on file    Number of [place] in the Last Year: Not on file    Unstable Housing in the Last Year: Not on file     TOBACCO:   reports that she has never smoked. She has never used smokeless tobacco.  ETOH:   reports current alcohol use.     A:  PHQ Scores 6/13/2022 5/27/2022 7/12/2021 6/12/2019   PHQ2 Score 3 0 2 1   PHQ9 Score 7 0 4 1     Interpretation of Total Score Depression Severity: 1-4 = Minimal depression, 5-9 = Mild depression, 10-14 = Moderate depression, 15-19 = Moderately severe depression, 20-27 = Severe depression      Diagnosis:  Major depressive disorder, moderate, recurrent  generalized anxiety disorder    Plan:  Pt interventions:  Practiced assertive communication, Trained in strategies for increasing balanced thinking, Trained in improving communication skills, Supportive techniques and Identified maladaptive thoughts        Documentation was done using voice recognition dragon software. Every effort was made to ensure accuracy; however, inadvertent, unintentional computerized transcription errors may be present.

## 2022-06-30 ENCOUNTER — TELEPHONE (OUTPATIENT)
Dept: ADMINISTRATIVE | Age: 57
End: 2022-06-30

## 2022-07-01 ENCOUNTER — TELEPHONE (OUTPATIENT)
Dept: ADMINISTRATIVE | Age: 57
End: 2022-07-01

## 2022-07-01 NOTE — TELEPHONE ENCOUNTER
Called pt. She will swing by the [place] office to sign. Form was placed up front. Desk staff notified where to find it.

## 2022-07-01 NOTE — TELEPHONE ENCOUNTER
Submitted PA for DULoxetine HCl 30MG dr capsules. Key: [id number]. Via CMM STATUS: DENIED. Letter attached. If this requires a response please respond to the pool ( P [facility] [address] [facility]). Thank you please advise patient.

## 2022-07-08 NOTE — PROGRESS NOTES
Emeka Gallardo MD  [facility]) Physicians - Rheumatology    [x] [facility]:  [facility]  [address] [facility] [] [address]:  [address], [address]   Office: [phone]  Fax: [phone]     RHEUMATOLOGY PROGRESS NOTE    ASSESSMENT/PLAN:  Wing Rinaldi is a 62 y.o. female w/ PsA, PsO, secondary Sjogren syndrome and fibromyalgia previously followed by rheumatologist (Dr. Dianne Medrano) in [facility].        PMHx pertinent for chronic back pain from DDD, spondylosis and facet arthropathy of L-spine (follows w/ Dr. Fito Goodman), KARTHIK noncompliant w/ CPAP, HTN, pre-diabetes, fatty liver, IBS, depression and anxiety.     Per prior rheumatologist, Dr. Boykin note from 1/19/17: Dx of PsA on Humira \"was doing well initially on Humira, still flares, no synovitis\", Humira was switched to Stelara in 3/17.  PsA was noted to improve on Stelara per rheumatologist note from 6/29/17.     Reviewed [facility] note from 9/17/18, Dx of PsO w/ PsA \"previously on many biologics per Rheumatology\", exam at the time revealed \"palms w/ few pink thin scaling plaques\".      Current rheum meds:  SSZ 1000 mg BID: started in 3/2020  MTX 12.5 mg PO wkly: started in 11/2021, developed leukopenia on 17.5 mg PO wkly  Pilocarpine 5 mg TID  Duloxetine and Amitriptyline: prescribed by PCP  OTC Biotene products, artificial tears     Prior rheum meds:  Meloxicam provided short term pain relief but worsened her GERD, she thinks she took Celecoxib for a couple of yrs in her 30s  Celecoxib 100 mg BID: caused GERD  Medrol dose pack: significantly improves joint pain  MTX: per pt oral MTX caused thrush, she thinks this \"worked a little bit\"  Adalimumab: per pt this was [employer]" but it stopped working after a few months  Ustekinumab: per pt this worked mainly for her joints - she felt this worked but stopped after two months d/t insurance issues, submitted PA again in 5/2022, pt stated she was reportedly told her new insurance \"wouldn't cover it because of my pre-existing condition\"  Cyclobenzaprine: ineffective for back pain  Gabapentin: does not remember response    1. Psoriatic arthritis ([facility] [address]  Assessment & Plan:  - inflammatory arthritis remains mildly active on current immunosuppression regimen. Unable to tolerate higher dose of MTX 17.5 mg PO wkly d/t leukopenia. - submitted PA for Ustekinumab in 5/2022, pt stated her new insurance \"wouldn't cover it d/t my pre-existing condition\". I'm unclear about this, we did not receive any denial letter. Discussed additional paperwork needing her signature for PA however pt preferred to hold off on adding a biologic DMARD at this point. - cont MTX 12.5 mg PO wkly and SSZ 1000 mg BID. Repeat CBC w/ diff now, discussed we may need to lower MTX or SSZ if leukopenia worsens. Orders:  -     methotrexate (RHEUMATREX) 2.5 MG chemo tablet; Take 5 tablets by mouth once a week, Disp-60 tablet, [id number]  -     folic acid (FOLVITE) 1 MG tablet; Take 1 tablet by mouth daily, Disp-90 tablet, R-0Normal  -     sulfaSALAzine (AZULFIDINE) 500 MG tablet; Take 2 tablets by mouth 2 times daily, Disp-360 tablet, R-0Normal  2. Psoriasis  Assessment & Plan:  - skin remains clear on MTX. Orders:  -     methotrexate (RHEUMATREX) 2.5 MG chemo tablet; Take 5 tablets by mouth once a week, Disp-60 tablet, [id number]  -     folic acid (FOLVITE) 1 MG tablet; Take 1 tablet by mouth daily, Disp-90 tablet, R-0Normal  3. Sjogren syndrome, unspecified ([facility] [address]  Assessment & Plan:  - chronic sicca, pt stated her ophthalmologist confirmed her eye dryness. - partial relief from OTC artificial tears and Biotene products. She is seeing good response from Pilocarpine. Orders:  -     pilocarpine (SALAGEN) 5 MG tablet; Take 1 tablet by mouth 3 times daily, Disp-270 tablet, R-0Normal  -     CBC with Auto Differential; Future  4. High risk medication use  Assessment & Plan:  - safety labs for MTX and SSZ Q3mo.  Discussed she is past due for CMP. Orders:  -     Hepatic Function Panel; Future  -     CBC with Auto Differential; Future  -     Creatinine; Future    Return in about 3 months (around 10/13/2022) for lab result discussion and treatment plan, medication monitoring. The risks and benefits of my recommendations, as well as other treatment options, benefits and side effects were discussed with the patient today. Questions were answered. NOTE: This report is transcribed by using voice recognition software dragon. Every effort is made to ensure accuracy; however, inadvertent computerized  transcription errors may be present. SUBJECTIVE:  Past medical/surgical history, medications and allergies are reviewed and updated as appropriate. Interval Hx:   Pt reports intermittent pain and swelling mainly in her R 2nd finger extending into her R MCP joints. R MCP joints feel swollen currently. She reports overall well controlled joint Sx on reduced dose of MTX 12.5 mg PO wkly and SSZ 1000 mg BID. Skin remains clear. Denies recurrent infections or skin ulceration.     Rheumatologic ROS:  Constitutional: denies chronic fatigue, fever/chills, night sweats, unintentional weight loss  Integumentary: +chronic diffuse hair thinning, denies photosensitivity, rash, or Sx of Raynaud's phenomenon  Eyes: +dry eyes, denies redness or pain, visual disturbance, or floaters  Nose: denies nasal ulcers or recurrent sinusitis  Oral cavity: +dry mouth, denies oral ulcers  Cardiovascular: denies CP, palpitations, Hx of pericardial effusion or pericarditis  Respiratory: denies SOB, cough, hemoptysis, or pleurisy  Gastrointestinal: +chronic GERD refer to above HPI, denies chronic diarrhea or bloody stools  Musculoskeletal:  refer to above HPI     Allergies   Allergen Reactions    Norco [Hydrocodone-Acetaminophen] Other (See Comments)     Bad headache       Past Medical History:        Diagnosis Date    Chronic depression     DDD (degenerative disc disease), lumbar     Fatty liver     MRI 3/2021 mild    Fibromyalgia     Hiatal hernia     HTN (hypertension)     Obesity (BMI 30.0-34. 9)     BMI 32.68     KARTHIK (obstructive sleep apnea)     has mask, doesn't wear it    Pneumonia     in her 35s had pna B 7 yrs in a row    Psoriatic arthritis ([facility] [address]        Past Surgical History:        Procedure Laterality Date    BLADDER SUSPENSION  2004     SECTION  08/31/1987    x1    CHOLECYSTECTOMY  2000    ENDOMETRIAL ABLATION      HIATAL HERNIA REPAIR N/A 2021    LAPAROSCOPIC PARAESOPHAGEAL HIATAL HERNIA REPAIR, NISSEN FUNDOPLICATION performed by Lauren Welch MD at [facility] [address], [address] (CERVIX REMOVED)  2009    heavy menses    NASAL SEPTUM SURGERY      NERVE SURGERY Bilateral 2019    BILATERAL L4-5 AND L5-S1 LUMBAR FACET INJECTIONS WITH FLUOROSCOPY performed by Heike Barrett MD at [place] [address]         Medications:    Current Outpatient Medications   Medication Sig Dispense Refill    methotrexate (RHEUMATREX) 2.5 MG chemo tablet Take 5 tablets by mouth once a week 60 tablet 0    pilocarpine (SALAGEN) 5 MG tablet Take 1 tablet by mouth 3 times daily 398 tablet 0    folic acid (FOLVITE) 1 MG tablet Take 1 tablet by mouth daily 90 tablet 0    sulfaSALAzine (AZULFIDINE) 500 MG tablet Take 2 tablets by mouth 2 times daily 360 tablet 0    busPIRone (BUSPAR) 5 MG tablet TAKE 1 TABLET BY MOUTH 3 TIMES DAILY AS NEEDED (ANXIETY) 90 tablet 3    DULoxetine (CYMBALTA) 30 MG extended release capsule Take 3 capsules by mouth daily 270 capsule 3    metoprolol tartrate (LOPRESSOR) 50 MG tablet TAKE 1 TABLET BY MOUTH TWICE A  tablet 3    olmesartan (BENICAR) 20 MG tablet TAKE 1/2 TABLET BY MOUTH EVERY DAY 45 tablet 2    amitriptyline (ELAVIL) 50 MG tablet TAKE 1 TABLET BY MOUTH NIGHTLY,MAY TAKE AN ADDITIONAL 50 MG AS NEEDED FOR INSOMNIA 180 tablet 1    albuterol sulfate HFA (PROVENTIL HFA) 108 (90 Base) MCG/ACT inhaler Inhale 2 puffs into the lungs every 4 hours as needed for Wheezing or Shortness of Breath 18 g 3    SUMAtriptan (IMITREX) 100 MG tablet Take 1 tablet by mouth once as needed for Migraine May repeat once after 2 hours if needed. No more than 2 per 24 hour period. 9 tablet 3    Cholecalciferol (VITAMIN D3) 2000 units CAPS Take by mouth      ustekinumab (STELARA) 45 MG/0.5ML SOSY prefilled syringe Start 45 mg SC x 1 on wk 0, 4 then Q12wk. (Patient not taking: Reported on 7/13/2022) 1 mL 0     No current facility-administered medications for this visit. OBJECTIVE:  Physical Exam:  /82 (Site: Right Upper Arm, Position: Sitting, Cuff Size: Medium Adult)   Pulse 75   Temp 97.1 °F (36.2 °C) (Infrared)   Wt 194 lb (88 kg)   SpO2 95%   BMI 32.28 kg/m²     GEN: AAOx3, in NAD, well-appearing  HEAD: normocephalic, atraumatic  EYES: no injection or icterus  CVS: RRR  LUNGS: in no acute respiratory distress, CTAB  Upper extremities:              Hands: R MCP joints w/ swelling TTP, b/l PIP joints w/o swelling NTTP, DIP joints w/o swelling NTTP, full fist formation w/ good  strength              Wrist: b/l wrist joints w/o swelling NTTP, FROM              Elbow: no synovitis or bursitis, b/l lateral epicondyles slightly TTP, FROM  Lower extremities:              Knees: no warmth or effusion present, FROM              Ankles: no synovitis, FROM, Achilles tendons w/o swelling or warmth NTTP              Feet: no toe swelling or pain or warmth on palpation w/ FROM, negative MTP squeeze test b/l  INTEGUMENT: no active psoriatic lesions, no nail pitting, no patchy alopecia, no other rash, petechiae, bruises, or palpable purpura, no clubbing or digital ulcers    DATA:  Labs:   I personally reviewed interval labs and discussed w/ the pt in detail which showed:    Lab Results   Component Value Date    WBC 3.8 (L) 05/25/2022    HGB 12.5 05/25/2022    HCT 37.1 05/25/2022    MCV 99.4 05/25/2022     05/25/2022    LYMPHOPCT 34.9 05/25/2022    RBC 3.73 (L) 05/25/2022    MCH 33.6 05/25/2022    MCHC 33.8 05/25/2022    RDW 14.6 05/25/2022     Lab Results   Component Value Date     06/18/2021    K 4.4 06/18/2021     06/18/2021    CO2 27 06/18/2021    BUN 12 06/18/2021    CREATININE 0.9 04/07/2022    GLUCOSE 125 (H) 06/18/2021    CALCIUM 9.1 06/18/2021    PROT 6.4 05/25/2022    LABALBU 3.6 05/25/2022    BILITOT 0.3 04/07/2022    ALKPHOS 110 04/07/2022    AST 16 04/07/2022    ALT 18 04/07/2022    LABGLOM >60 04/07/2022    GFRAA >60 04/07/2022    AGRATIO 2.6 (H) 09/25/2020    GLOB 1.7 09/25/2020     Lab Results   Component Value Date    COLORU YELLOW 12/22/2021    CLARITYU Clear 12/22/2021    GLUCOSEU Negative 12/22/2021    BILIRUBINUR SMALL (A) 12/22/2021    KETUA Negative 12/22/2021    SPECGRAV 1.019 12/22/2021    BLOODU Negative 12/22/2021    PHUR 6.5 12/22/2021    PROTEINU Negative 12/22/2021    UROBILINOGEN 0.2 12/22/2021    NITRU Negative 12/22/2021    LEUKOCYTESUR Negative 12/22/2021    LABMICR Not Indicated 12/22/2021    URINETYPE [employer] 12/22/2021    HYALCAST 1 12/22/2021    WBCUA 2 12/22/2021    RBCUA 4 12/22/2021    EPIU 1 12/22/2021     Lab Results   Component Value Date    VITD25 46.5 09/25/2020     Lab Results   Component Value Date    C3 136.3 02/25/2022    C3 141.3 07/24/2019     Lab Results   Component Value Date    C4 22.5 02/25/2022    C4 23.3 07/24/2019     No results found for: ANTIDSDNAIGG     No results found for: [facility]     Lab Results   Component Value Date    CRP <3.0 04/07/2022    CRP <3.0 12/22/2021    CRP 4.1 11/03/2021    CRP <0.3 03/04/2020     Lab Results   Component Value Date    SEDRATE 10 03/04/2020    SEDRATE 7 07/24/2019     No results found for: CKTOTAL    Negative RF, CCP (7/24/19)  Negative HLA B27 (7/24/19)  Negative JULIANO x 3 (7/24/19, 3/4/20, 2/25/22)  Negative SSA, SSB (7/24/19)  Negative hepatitis B and C serologies (7/24/19)  Negative Quantiferon TB (4/7/22)    Imaging:  I personally reviewed interval imaging and discussed w/ the pt in detail which included:    MRI L-spine (7/10/19): Mild neural foraminal narrowing.  No significant spinal canal stenosis.       X-rays (7/24/19):  R hand:  No fracture or dislocation. No underlying degenerative changes. Joint spaces are normal with no significant osteophytes. No inflammatory changes. No erosions. No soft tissue swelling.      L hand:  No fracture or dislocation. No underlying degenerative changes. Joint spaces are normal with no significant osteophytes.  No inflammatory changes. No erosions. No soft tissue swelling.      R knee:  No fracture or dislocation. No underlying degenerative changes. Joint spaces are normal with no significant osteophytes.  No inflammatory changes. No erosions. No soft tissue swelling.      L knee:  No fracture or dislocation. No underlying degenerative changes. Joint spaces are normal with no significant osteophytes. No inflammatory changes. No erosions. No soft tissue swelling.      SI joints:  Normal, symmetric appearance of the b/l SI joints.  No widening or significant sclerosis.  No erosions are appreciated.  Pelvic bones are otherwise unremarkable.  No significant soft tissue findings.      I independently reviewed above X-rays - mild OA changes in the b/l PIP joints otherwise well preserved joint spaces, no significant juxta-articular osteopenia, no erosive changes seen. Tj Davilaen w/ mild medial joint space narrowing, no chondrocalcinosis.  SI joints patent w/o erosive changes, some sclerosis.     MRI R hand (9/3/19)  No erosive changes or synovitis.      MRI pelvis (9/3/19): No evidence of active sacroiliitis. Above results were discussed w/ the pt in detail during today's visit.

## 2022-07-13 ENCOUNTER — OFFICE VISIT (OUTPATIENT)
Dept: RHEUMATOLOGY | Age: 57
End: 2022-07-13
Payer: COMMERCIAL

## 2022-07-13 VITALS
WEIGHT: 194 LBS | HEART RATE: 75 BPM | TEMPERATURE: 97.1 F | SYSTOLIC BLOOD PRESSURE: 112 MMHG | OXYGEN SATURATION: 95 % | BODY MASS INDEX: 32.28 KG/M2 | DIASTOLIC BLOOD PRESSURE: 82 MMHG

## 2022-07-13 DIAGNOSIS — M35.00 SJOGREN SYNDROME, UNSPECIFIED (HCC): ICD-10-CM

## 2022-07-13 DIAGNOSIS — Z79.899 HIGH RISK MEDICATION USE: ICD-10-CM

## 2022-07-13 DIAGNOSIS — L40.9 PSORIASIS: ICD-10-CM

## 2022-07-13 DIAGNOSIS — L40.50 PSORIATIC ARTHRITIS (HCC): Primary | ICD-10-CM

## 2022-07-13 PROBLEM — R21 RASH: Status: RESOLVED | Noted: 2022-05-17 | Resolved: 2022-07-13

## 2022-07-13 PROCEDURE — 99214 OFFICE O/P EST MOD 30 MIN: CPT | Performed by: INTERNAL MEDICINE

## 2022-07-13 RX ORDER — FOLIC ACID 1 MG/1
1 TABLET ORAL DAILY
Qty: 90 TABLET | Refills: 0 | Status: SHIPPED | OUTPATIENT
Start: 2022-07-13 | End: 2022-10-12

## 2022-07-13 RX ORDER — METHOTREXATE 2.5 MG/1
12.5 TABLET ORAL WEEKLY
Qty: 60 TABLET | Refills: 0 | Status: SHIPPED | OUTPATIENT
Start: 2022-07-13 | End: 2022-10-12 | Stop reason: SINTOL

## 2022-07-13 RX ORDER — SULFASALAZINE 500 MG/1
1000 TABLET ORAL 2 TIMES DAILY
Qty: 360 TABLET | Refills: 0 | Status: SHIPPED | OUTPATIENT
Start: 2022-07-13 | End: 2022-10-12 | Stop reason: SDUPTHER

## 2022-07-13 RX ORDER — PILOCARPINE HYDROCHLORIDE 5 MG/1
5 TABLET, FILM COATED ORAL 3 TIMES DAILY
Qty: 270 TABLET | Refills: 0 | Status: SHIPPED | OUTPATIENT
Start: 2022-07-13 | End: 2022-10-12

## 2022-07-13 NOTE — ASSESSMENT & PLAN NOTE
- inflammatory arthritis remains mildly active on current immunosuppression regimen. Unable to tolerate higher dose of MTX 17.5 mg PO wkly d/t leukopenia. - submitted PA for Ustekinumab in 5/2022, pt stated her new insurance \"wouldn't cover it d/t my pre-existing condition\". I'm unclear about this, we did not receive any denial letter. Discussed additional paperwork needing her signature for PA however pt preferred to hold off on adding a biologic DMARD at this point. - cont MTX 12.5 mg PO wkly and SSZ 1000 mg BID. Repeat CBC w/ diff now, discussed we may need to lower MTX or SSZ if leukopenia worsens.

## 2022-07-15 DIAGNOSIS — M35.00 SJOGREN SYNDROME, UNSPECIFIED (HCC): ICD-10-CM

## 2022-07-15 DIAGNOSIS — Z79.899 HIGH RISK MEDICATION USE: ICD-10-CM

## 2022-07-15 LAB
ALBUMIN SERPL-MCNC: 4.2 G/DL (ref 3.4–5)
ALP BLD-CCNC: 97 U/L (ref 40–129)
ALT SERPL-CCNC: 15 U/L (ref 10–40)
AST SERPL-CCNC: 15 U/L (ref 15–37)
BASOPHILS ABSOLUTE: 0 K/UL (ref 0–0.2)
BASOPHILS RELATIVE PERCENT: 0.5 %
BILIRUB SERPL-MCNC: <0.2 MG/DL (ref 0–1)
BILIRUBIN DIRECT: <0.2 MG/DL (ref 0–0.3)
BILIRUBIN, INDIRECT: ABNORMAL MG/DL (ref 0–1)
CREAT SERPL-MCNC: 0.7 MG/DL (ref 0.6–1.1)
EOSINOPHILS ABSOLUTE: 0.1 K/UL (ref 0–0.6)
EOSINOPHILS RELATIVE PERCENT: 2.3 %
GFR AFRICAN AMERICAN: >60
GFR NON-AFRICAN AMERICAN: >60
HCT VFR BLD CALC: 34.9 % (ref 36–48)
HEMOGLOBIN: 11.7 G/DL (ref 12–16)
LYMPHOCYTES ABSOLUTE: 1.9 K/UL (ref 1–5.1)
LYMPHOCYTES RELATIVE PERCENT: 42.1 %
MCH RBC QN AUTO: 33.5 PG (ref 26–34)
MCHC RBC AUTO-ENTMCNC: 33.6 G/DL (ref 31–36)
MCV RBC AUTO: 99.7 FL (ref 80–100)
MONOCYTES ABSOLUTE: 0.5 K/UL (ref 0–1.3)
MONOCYTES RELATIVE PERCENT: 10.3 %
NEUTROPHILS ABSOLUTE: 2 K/UL (ref 1.7–7.7)
NEUTROPHILS RELATIVE PERCENT: 44.8 %
PDW BLD-RTO: 14 % (ref 12.4–15.4)
PLATELET # BLD: 315 K/UL (ref 135–450)
PMV BLD AUTO: 7.8 FL (ref 5–10.5)
RBC # BLD: 3.5 M/UL (ref 4–5.2)
TOTAL PROTEIN: 6.1 G/DL (ref 6.4–8.2)
WBC # BLD: 4.4 K/UL (ref 4–11)

## 2022-08-19 ENCOUNTER — OFFICE VISIT (OUTPATIENT)
Dept: INTERNAL MEDICINE CLINIC | Age: 57
End: 2022-08-19
Payer: COMMERCIAL

## 2022-08-19 VITALS
HEART RATE: 68 BPM | DIASTOLIC BLOOD PRESSURE: 90 MMHG | BODY MASS INDEX: 32.05 KG/M2 | OXYGEN SATURATION: 93 % | SYSTOLIC BLOOD PRESSURE: 118 MMHG | WEIGHT: 192.6 LBS

## 2022-08-19 DIAGNOSIS — I51.7 LVH (LEFT VENTRICULAR HYPERTROPHY): ICD-10-CM

## 2022-08-19 DIAGNOSIS — F33.1 MODERATE EPISODE OF RECURRENT MAJOR DEPRESSIVE DISORDER (HCC): ICD-10-CM

## 2022-08-19 DIAGNOSIS — Z00.00 WELL ADULT EXAM: ICD-10-CM

## 2022-08-19 DIAGNOSIS — Q24.8 PERICARDIAL CYST: ICD-10-CM

## 2022-08-19 DIAGNOSIS — R73.03 PRE-DIABETES: ICD-10-CM

## 2022-08-19 DIAGNOSIS — D64.9 NORMOCYTIC ANEMIA: ICD-10-CM

## 2022-08-19 DIAGNOSIS — E78.5 MILD HYPERLIPIDEMIA: ICD-10-CM

## 2022-08-19 DIAGNOSIS — I10 ESSENTIAL HYPERTENSION: ICD-10-CM

## 2022-08-19 DIAGNOSIS — K76.0 FATTY LIVER: ICD-10-CM

## 2022-08-19 DIAGNOSIS — Z12.31 ENCOUNTER FOR SCREENING MAMMOGRAM FOR MALIGNANT NEOPLASM OF BREAST: ICD-10-CM

## 2022-08-19 DIAGNOSIS — F41.1 GAD (GENERALIZED ANXIETY DISORDER): ICD-10-CM

## 2022-08-19 DIAGNOSIS — Z00.00 WELL ADULT EXAM: Primary | ICD-10-CM

## 2022-08-19 DIAGNOSIS — Z00.00 ENCOUNTER FOR WELL ADULT EXAM WITHOUT ABNORMAL FINDINGS: ICD-10-CM

## 2022-08-19 PROBLEM — F32.A CHRONIC DEPRESSION: Status: RESOLVED | Noted: 2019-06-23 | Resolved: 2022-08-19

## 2022-08-19 LAB
ANION GAP SERPL CALCULATED.3IONS-SCNC: 13 MMOL/L (ref 3–16)
BASOPHILS ABSOLUTE: 0 K/UL (ref 0–0.2)
BASOPHILS RELATIVE PERCENT: 0.7 %
BUN BLDV-MCNC: 12 MG/DL (ref 7–20)
CALCIUM SERPL-MCNC: 9.8 MG/DL (ref 8.3–10.6)
CHLORIDE BLD-SCNC: 99 MMOL/L (ref 99–110)
CHOLESTEROL, TOTAL: 218 MG/DL (ref 0–199)
CO2: 27 MMOL/L (ref 21–32)
CREAT SERPL-MCNC: 0.7 MG/DL (ref 0.6–1.1)
EOSINOPHILS ABSOLUTE: 0.1 K/UL (ref 0–0.6)
EOSINOPHILS RELATIVE PERCENT: 2.1 %
FERRITIN: 76.9 NG/ML (ref 15–150)
FOLATE: >20 NG/ML (ref 4.78–24.2)
GFR AFRICAN AMERICAN: >60
GFR NON-AFRICAN AMERICAN: >60
GLUCOSE BLD-MCNC: 86 MG/DL (ref 70–99)
HCT VFR BLD CALC: 38.9 % (ref 36–48)
HDLC SERPL-MCNC: 52 MG/DL (ref 40–60)
HEMOGLOBIN: 12.7 G/DL (ref 12–16)
IRON SATURATION: 44 % (ref 15–50)
IRON: 148 UG/DL (ref 37–145)
LDL CHOLESTEROL CALCULATED: 149 MG/DL
LYMPHOCYTES ABSOLUTE: 1.4 K/UL (ref 1–5.1)
LYMPHOCYTES RELATIVE PERCENT: 35.8 %
MCH RBC QN AUTO: 32.6 PG (ref 26–34)
MCHC RBC AUTO-ENTMCNC: 32.7 G/DL (ref 31–36)
MCV RBC AUTO: 99.6 FL (ref 80–100)
MONOCYTES ABSOLUTE: 0.3 K/UL (ref 0–1.3)
MONOCYTES RELATIVE PERCENT: 8.4 %
NEUTROPHILS ABSOLUTE: 2 K/UL (ref 1.7–7.7)
NEUTROPHILS RELATIVE PERCENT: 53 %
PDW BLD-RTO: 14.8 % (ref 12.4–15.4)
PLATELET # BLD: 309 K/UL (ref 135–450)
PMV BLD AUTO: 7.6 FL (ref 5–10.5)
POTASSIUM SERPL-SCNC: 4.6 MMOL/L (ref 3.5–5.1)
RBC # BLD: 3.91 M/UL (ref 4–5.2)
SODIUM BLD-SCNC: 139 MMOL/L (ref 136–145)
TOTAL IRON BINDING CAPACITY: 340 UG/DL (ref 260–445)
TRIGL SERPL-MCNC: 84 MG/DL (ref 0–150)
TSH REFLEX: 3.34 UIU/ML (ref 0.27–4.2)
VITAMIN B-12: 249 PG/ML (ref 211–911)
VITAMIN D 25-HYDROXY: 49.9 NG/ML
VLDLC SERPL CALC-MCNC: 17 MG/DL
WBC # BLD: 3.8 K/UL (ref 4–11)

## 2022-08-19 PROCEDURE — 99396 PREV VISIT EST AGE 40-64: CPT | Performed by: INTERNAL MEDICINE

## 2022-08-19 NOTE — PROGRESS NOTES
Patient: Doyle Romero is a 62 y.o. female who presents today with the following Chief Complaint(s):  Chief Complaint   Patient presents with    Check-Up              HPI    Here today for wellness visit. Does need her second Shingles vaccine. Did stop MTX d/t side effects with tongue soreness. HTN- no issues with blood pressure. Did see Dr. Abena Tamayo d/t LVH. To see him yearly. Does feel a \"flutter\" every now and then. Does still have brief episodes of SOB, random, no worse. Did see Dr. Katherine Bustamante regarding anterior mediastinal mass and is stable for 1 year. Depression- does feel lonely as she is not dating anyone and does not have single friends. Does not exercise other than walking laps in her backyard pool. Is worried about her sugars as she has not been watching her diet. Colonsocpy 2019. Normal, repeat in 2024. Mammogram 2020. Overdue. S/p CONOR/BSO d/t heavy menses in 2009. No cancer.      Wt Readings from Last 3 Encounters:   08/19/22 192 lb 9.6 oz (87.4 kg)   07/13/22 194 lb (88 kg)   05/27/22 193 lb 12.8 oz (87.9 kg)     Temp Readings from Last 3 Encounters:   07/13/22 97.1 °F (36.2 °C) (Infrared)   10/22/21 98.1 °F (36.7 °C) (Temporal)   04/30/21 97.1 °F (36.2 °C) (Infrared)     BP Readings from Last 3 Encounters:   08/19/22 (!) 118/90   07/13/22 112/82   05/27/22 110/80     Pulse Readings from Last 3 Encounters:   08/19/22 68   07/13/22 75   05/27/22 76         Results for orders placed or performed in visit on 07/15/22   Creatinine   Result Value Ref Range    Creatinine 0.7 0.6 - 1.1 mg/dL    GFR Non-African American >60 >60    GFR  >60 >60   CBC with Auto Differential   Result Value Ref Range    WBC 4.4 4.0 - 11.0 K/uL    RBC 3.50 (L) 4.00 - 5.20 M/uL    Hemoglobin 11.7 (L) 12.0 - 16.0 g/dL    Hematocrit 34.9 (L) 36.0 - 48.0 %    MCV 99.7 80.0 - 100.0 fL    MCH 33.5 26.0 - 34.0 pg    MCHC 33.6 31.0 - 36.0 g/dL    RDW 14.0 12.4 - 15.4 %    Platelets 837 135 - 450 K/uL    MPV 7.8 5.0 - 10.5 fL    Neutrophils % 44.8 %    Lymphocytes % 42.1 %    Monocytes % 10.3 %    Eosinophils % 2.3 %    Basophils % 0.5 %    Neutrophils Absolute 2.0 1.7 - 7.7 K/uL    Lymphocytes Absolute 1.9 1.0 - 5.1 K/uL    Monocytes Absolute 0.5 0.0 - 1.3 K/uL    Eosinophils Absolute 0.1 0.0 - 0.6 K/uL    Basophils Absolute 0.0 0.0 - 0.2 K/uL   Hepatic Function Panel   Result Value Ref Range    Total Protein 6.1 (L) 6.4 - 8.2 g/dL    Albumin 4.2 3.4 - 5.0 g/dL    Alkaline Phosphatase 97 40 - 129 U/L    ALT 15 10 - 40 U/L    AST 15 15 - 37 U/L    Total Bilirubin <0.2 0.0 - 1.0 mg/dL    Bilirubin, Direct <0.2 0.0 - 0.3 mg/dL    Bilirubin, Indirect see below 0.0 - 1.0 mg/dL          Allergies   Allergen Reactions    Norco [Hydrocodone-Acetaminophen] Other (See Comments)     Bad headache      Past Medical History:   Diagnosis Date    Chronic depression     DDD (degenerative disc disease), lumbar     Fatty liver     MRI 3/2021 mild    Fibromyalgia     Hiatal hernia     HTN (hypertension)     Obesity (BMI 30.0-34. 9)     BMI 32.68     KARTHIK (obstructive sleep apnea)     has mask, doesn't wear it    Pneumonia     in her 35s had pna B 7 yrs in a row    Psoriatic arthritis [facility])       Past Surgical History:   Procedure Laterality Date    BLADDER SUSPENSION  2004     SECTION  08/31/1987    x1    CHOLECYSTECTOMY  2000    ENDOMETRIAL ABLATION  2007    HIATAL HERNIA REPAIR N/A 2021    LAPAROSCOPIC PARAESOPHAGEAL HIATAL HERNIA REPAIR, NISSEN FUNDOPLICATION performed by Sherry Lim MD at [address], TOTAL ABDOMINAL (CERVIX REMOVED)  2009    heavy menses    NASAL SEPTUM SURGERY  2005    NERVE SURGERY Bilateral 2019    BILATERAL L4-5 AND L5-S1 LUMBAR FACET INJECTIONS WITH FLUOROSCOPY performed by Jani Ashley MD at [address]  2007      Social History     Socioeconomic History    Marital status:      Spouse name: Not on file    Number of children: Not on file    Years of education: Not on file    Highest education level: Not on file   Occupational History    Not on file   Tobacco Use    Smoking status: Never    Smokeless tobacco: Never   Vaping Use    Vaping Use: Never used   Substance and Sexual Activity    Alcohol use: Yes     Comment: every month or so    Drug use: Yes     Types: Marijuana Delmy Alejandra)     Comment: last 2019    Sexual activity: Not on file   Other Topics Concern    Not on file   Social History Narrative    Not on file     Social Determinants of Health     Financial Resource Strain: Low Risk     Difficulty of Paying Living Expenses: Not hard at all   Food Insecurity: No Food Insecurity    Worried About Running Out of Food in the Last Year: Never true    Ran Out of Food in the Last Year: Never true   Transportation Needs: Not on file   Physical Activity: Not on file   Stress: Not on file   Social Connections: Not on file   Intimate Partner Violence: Not on file   Housing Stability: Not on file     Family History   Problem Relation Age of Onset    Cancer Mother         gallbladder cancer    High Blood Pressure Mother     Alcohol Abuse Mother     High Blood Pressure Father     Cancer Sister         small cell lung cancer (smoker)    Heart Disease Maternal Grandmother     Heart Disease Maternal Grandfather     Stroke Maternal Grandfather     Heart Disease Paternal Grandmother     Heart Disease Paternal Grandfather     No Known Problems Half-Brother     No Known Problems Half-Sister     Other Half-Sibling         skin cancer    Other Half-Sister         blood clots    Substance Abuse Daughter 32        in remission         Outpatient Medications Prior to Visit   Medication Sig Dispense Refill    pilocarpine (SALAGEN) 5 MG tablet Take 1 tablet by mouth 3 times daily 227 tablet 0    folic acid (FOLVITE) 1 MG tablet Take 1 tablet by mouth daily 90 tablet 0    sulfaSALAzine (AZULFIDINE) 500 MG tablet Take 2 tablets by mouth 2 times daily 360 Normocephalic. Right Ear: Tympanic membrane, ear canal and external ear normal.      Left Ear: Tympanic membrane, ear canal and external ear normal.      Mouth/Throat:      Pharynx: No oropharyngeal exudate or posterior oropharyngeal erythema. Eyes:      General: No scleral icterus. Extraocular Movements: Extraocular movements intact. Conjunctiva/sclera: Conjunctivae normal.      Pupils: Pupils are equal, round, and reactive to light. Neck:      Thyroid: No thyroid mass or thyromegaly. Vascular: No carotid bruit. Cardiovascular:      Rate and Rhythm: Normal rate and regular rhythm. Heart sounds: Normal heart sounds. No murmur heard. Pulmonary:      Effort: Pulmonary effort is normal.      Breath sounds: Normal breath sounds. Musculoskeletal:      Right lower leg: No edema. Left lower leg: No edema. Lymphadenopathy:      Cervical: No cervical adenopathy. Neurological:      General: No focal deficit present. Mental Status: She is alert and oriented to person, place, and time. Psychiatric:         Mood and Affect: Mood normal.         Behavior: Behavior normal. Behavior is cooperative. ASSESSMENT/PLAN:    Problem List Items Addressed This Visit       Essential hypertension     Mildly elevated today but generally well controlled. Continue olmesartan 10 mg daily and metoprolol 50 mg twice daily. Fatty liver     Check LFTs. FRANCISCA (generalized anxiety disorder)      Continue Cymbalta 90 mg daily. LVH (left ventricular hypertrophy)     Has been evaluated by cardiology. Remains on olmesartan 10 mg daily and Lopressor 50 mg twice daily. Diastolic blood pressure is elevated today but is typically not. Mild hyperlipidemia     Check lipid panel. Relevant Orders    Comprehensive Metabolic Panel    Lipid Panel    Moderate episode of recurrent major depressive disorder (HCC)     Continue Cymbalta 90 mg daily.          Normocytic anemia (BENICAR) 20 MG tablet TAKE 1/2 TABLET BY MOUTH EVERY DAY 45 tablet 2    amitriptyline (ELAVIL) 50 MG tablet TAKE 1 TABLET BY MOUTH NIGHTLY,MAY TAKE AN ADDITIONAL 50 MG AS NEEDED FOR INSOMNIA 180 tablet 1    albuterol sulfate HFA (PROVENTIL HFA) 108 (90 Base) MCG/ACT inhaler Inhale 2 puffs into the lungs every 4 hours as needed for Wheezing or Shortness of Breath 18 g 3    SUMAtriptan (IMITREX) 100 MG tablet Take 1 tablet by mouth once as needed for Migraine May repeat once after 2 hours if needed. No more than 2 per 24 hour period. 9 tablet 3    Cholecalciferol (VITAMIN D3) 2000 units CAPS Take by mouth      methotrexate (RHEUMATREX) 2.5 MG chemo tablet Take 5 tablets by mouth once a week (Patient not taking: Reported on 8/19/2022) 60 tablet 0     No current facility-administered medications for this visit. Return in about 6 months (around 2/19/2023).

## 2022-08-20 LAB
ESTIMATED AVERAGE GLUCOSE: 108.3 MG/DL
HBA1C MFR BLD: 5.4 %

## 2022-08-28 PROBLEM — Z00.00 WELL ADULT EXAM: Status: ACTIVE | Noted: 2022-08-28

## 2022-08-28 PROBLEM — D64.9 NORMOCYTIC ANEMIA: Status: ACTIVE | Noted: 2022-08-28

## 2022-08-29 ASSESSMENT — ENCOUNTER SYMPTOMS
CONSTIPATION: 0
CHEST TIGHTNESS: 0
DIARRHEA: 0
SHORTNESS OF BREATH: 0

## 2022-08-29 NOTE — ASSESSMENT & PLAN NOTE
No problems identified on exam.  Up-to-date on colonoscopy (2019, due in 2024). Overdue for mammogram-ordered. Status post CONOR/BSO in 2009 for noncancerous reasons and no longer requires Pap smears. Recommend Shingrix vaccine. Will get flu shot in the fall. Recommend getting fourth COVID-19 booster.

## 2022-08-29 NOTE — ASSESSMENT & PLAN NOTE
Following with Dr. Katya Bowser for CT surgery. Most recent CT of the chest was in March 2022 and was stable. Is being followed by serial CTs.

## 2022-08-29 NOTE — ASSESSMENT & PLAN NOTE
Has been evaluated by cardiology. Remains on olmesartan 10 mg daily and Lopressor 50 mg twice daily. Diastolic blood pressure is elevated today but is typically not.

## 2022-08-29 NOTE — ASSESSMENT & PLAN NOTE
Mildly elevated today but generally well controlled. Continue olmesartan 10 mg daily and metoprolol 50 mg twice daily.

## 2022-09-12 ENCOUNTER — HOSPITAL ENCOUNTER (OUTPATIENT)
Dept: MAMMOGRAPHY | Age: 57
Discharge: HOME | End: 2022-09-12

## 2022-09-12 DIAGNOSIS — Z12.31 VISIT FOR SCREENING MAMMOGRAM: ICD-10-CM

## 2022-09-12 RX ORDER — BUSPIRONE HYDROCHLORIDE 5 MG/1
5 TABLET ORAL 3 TIMES DAILY PRN
Qty: 90 TABLET | Refills: 3 | Status: SHIPPED | OUTPATIENT
Start: 2022-09-12

## 2022-09-12 NOTE — TELEPHONE ENCOUNTER
Recent Visits  Date Type Provider Dept   08/19/22 Office Visit Tyrel Deleon, DO [facility]   05/27/22 Office Visit Tyrel Deleon, DO [facility]   05/17/22 Office Visit Magdiel Castellano MD [facility]   12/22/21 Office Visit Bossman Ivan MD [facility]   11/05/21 Office Visit MELISSA Mckeon CNP [facility]   09/17/21 Office Visit Tyrel Deleon, DO [facility]   06/11/21 Office Visit Tyrel Deleon, DO [facility]   04/09/21 Office Visit Tyrel Deleon, DO [facility]   Showing recent visits within past 540 days with a meds authorizing provider and meeting all other requirements  Future Appointments  No visits were found meeting these conditions.   Showing future appointments within next 150 days with a meds authorizing provider and meeting all other requirements     8/19/2022

## 2022-09-27 PROBLEM — Z00.00 WELL ADULT EXAM: Status: RESOLVED | Noted: 2022-08-28 | Resolved: 2022-09-27

## 2022-10-08 NOTE — PROGRESS NOTES
Corine Forrest MD  [facility]) Physicians - Rheumatology    [x] [facility]:  [address] [facility] [] [address]:  [address], [address]   Office: [phone]  Fax: [phone]     RHEUMATOLOGY PROGRESS NOTE    ASSESSMENT/PLAN:  Caroline Mayorga is a 62 y.o. female w/ PsA, PsO, secondary Sjogren syndrome and fibromyalgia previously followed by rheumatologist (Dr. Mikey Page) in [facility]. PMHx pertinent for chronic back pain from DDD, spondylosis and facet arthropathy of L-spine (follows w/ Dr. Jenna Bloom), KARTHIK noncompliant w/ CPAP, HTN, pre-diabetes, fatty liver, IBS, depression and anxiety. Per prior rheumatologist, Dr. Mary Todd note from 1/19/17: Dx of PsA on Humira \"was doing well initially on Humira, still flares, no synovitis\", Humira was switched to Stelara in 3/17. PsA was noted to improve on Stelara per rheumatologist note from 6/29/17. Reviewed  Dermatology note from 9/17/18, Dx of PsO w/ PsA \"previously on many biologics per Rheumatology\", exam at the time revealed \"palms w/ few pink thin scaling plaques\".      Current rheum meds:  SSZ 1000 mg BID: started in 3/2020  Pilocarpine 5 mg TID  Duloxetine and Amitriptyline: prescribed by PCP  OTC Biotene products, artificial tears     Prior rheum meds:  Meloxicam provided short term pain relief but worsened her GERD, she thinks she took Celecoxib for a couple of yrs in her 30s  Celecoxib 100 mg BID: caused GERD  Medrol dose pack: significantly improves joint pain  MTX: per pt oral MTX caused thrush, she thinks this \"worked a little bit\", resumed in 11/2021, developed leukopenia on 17.5 mg PO wkly, self d/c'ed on 8/1/22 d/t sore tongue denied aphthous ulcers  Adalimumab: per pt this was [employer]" but it stopped working after a few months  Ustekinumab: per pt this worked mainly for her joints - she felt this worked but stopped after two months d/t insurance issues, submitted PA again in 5/2022, pt stated she was reportedly told her new insurance \"wouldn't cover it because of my pre-existing condition\"  Cyclobenzaprine: ineffective for back pain  Gabapentin: does not remember response    1. [facility] ([facility]  Assessment & Plan:  - inflammatory arthritis remains mildly active on SSZ.   - unable to tolerate MTX d/t tongue pain and leukopenia. - submitted PA for Ustekinumab in 5/2022, pt stated her new insurance \"wouldn't cover it d/t my pre-existing condition\". I'm unclear about this, we did not receive any denial letter. Discussed additional paperwork needing her signature for PA - pt agreed to work on this today. Orders:  -     sulfaSALAzine (AZULFIDINE) 500 MG tablet; Take 2 tablets by mouth 2 times daily, Disp-360 tablet, R-0Normal  -     ustekinumab (STELARA) 45 MG/0.5ML SOSY prefilled syringe; Start 45 mg SC x 1 on wk 0, 4 then Q12wk., Disp-1 mL, R-0Normal  2. Psoriasis  Assessment & Plan:  - skin remains clear off MTX.  - resume Ustekinumab as above #1. Orders:  -     ustekinumab (STELARA) 45 MG/0.5ML SOSY prefilled syringe; Start 45 mg SC x 1 on wk 0, 4 then Q12wk., Disp-1 mL, R-0Normal  3. Sjogren syndrome, unspecified ([facility]  Assessment & Plan:  - chronic sicca, pt stated her ophthalmologist confirmed her eye dryness. - xerostomia w/ partial relief from OTC artificial tears and Biotene products and Pilocarpine. She will switch Pilocarpine to Cevimeline 30 mg TID. Orders:  -     CBC with Auto Differential; Future  -     cevimeline (EVOXAC) 30 MG capsule; Take 1 capsule by mouth 3 times daily, Disp-270 capsule, R-0Normal  4. High risk medication use  Assessment & Plan:  - safety labs for SSZ toxicity monitoring Q3mo. - discussed her immunocompromised state and indications to hold SSZ. Orders:  -     CBC with Auto Differential; Future  -     AST; Future  -     ALT; Future  -     Creatinine;  Future    Return in about 2 months (around 12/12/2022) for lab result discussion and treatment plan, medication monitoring. The risks and benefits of my recommendations, as well as other treatment options, benefits and side effects were discussed with the patient today. Questions were answered. NOTE: This report is transcribed by using voice recognition software dragon. Every effort is made to ensure accuracy; however, inadvertent computerized  transcription errors may be present. SUBJECTIVE:  Past medical/surgical history, medications and allergies are reviewed and updated as appropriate. Interval Hx:   Pt states she self discontinued MTX over two months ago d/t a sore tongue. She denied any aphthous ulcers on MTX. She reports chronic xerostomia that's uncontrolled on Pilocarpine, she tells me that she forgets to take Pilocarpine on some days. She is currently taking SSZ 1000 mg BID. She tells me she never got a chance to fill out paperwork for Ustekinumab PA. She reports well controlled arthralgias on SSZ. Skin remains clear.     Rheumatologic ROS:  Constitutional: denies chronic fatigue, fever/chills, night sweats, unintentional weight loss  Integumentary: +chronic diffuse hair thinning, denies photosensitivity, rash, or Sx of Raynaud's phenomenon  Eyes: +dry eyes, denies redness or pain, visual disturbance, or floaters  Nose: denies nasal ulcers or recurrent sinusitis  Oral cavity: +dry mouth, denies oral ulcers  Cardiovascular: denies CP, palpitations, Hx of pericardial effusion or pericarditis  Respiratory: denies SOB, cough, hemoptysis, or pleurisy  Gastrointestinal: +chronic GERD refer to above HPI, denies chronic diarrhea or bloody stools  Musculoskeletal:  refer to above HPI     Allergies   Allergen Reactions    Norco [Hydrocodone-Acetaminophen] Other (See Comments)     Bad headache       Past Medical History:        Diagnosis Date    Chronic depression     DDD (degenerative disc disease), lumbar     Fatty liver     MRI 3/2021 mild    Fibromyalgia     Hiatal hernia 2011 HTN (hypertension)     Obesity (BMI 30.0-34. 9)     BMI 32.68     KARTHIK (obstructive sleep apnea)     has mask, doesn't wear it    Pneumonia     in her 35s had pna B 7 yrs in a row    Psoriatic arthritis [facility])        Past Surgical History:        Procedure Laterality Date    BLADDER SUSPENSION  2004     SECTION  08/31/1987    x1    CHOLECYSTECTOMY  2000    ENDOMETRIAL ABLATION  2007    HIATAL HERNIA REPAIR N/A 2021    LAPAROSCOPIC PARAESOPHAGEAL HIATAL HERNIA REPAIR, NISSEN FUNDOPLICATION performed by Angelita Silva MD at [facility], TOTAL ABDOMINAL (CERVIX REMOVED)  2009    heavy menses    NASAL SEPTUM SURGERY  2005    NERVE SURGERY Bilateral 2019    BILATERAL L4-5 AND L5-S1 LUMBAR FACET INJECTIONS WITH FLUOROSCOPY performed by Rico Ahumada MD at [facility]         Medications:    Current Outpatient Medications   Medication Sig Dispense Refill    sulfaSALAzine (AZULFIDINE) 500 MG tablet Take 2 tablets by mouth 2 times daily 360 tablet 0    ustekinumab (STELARA) 45 MG/0.5ML SOSY prefilled syringe Start 45 mg SC x 1 on wk 0, 4 then Q12wk. 1 mL 0    cevimeline (EVOXAC) 30 MG capsule Take 1 capsule by mouth 3 times daily 270 capsule 0    busPIRone (BUSPAR) 5 MG tablet TAKE 1 TABLET BY MOUTH 3 TIMES DAILY AS NEEDED (ANXIETY).  90 tablet 3    DULoxetine (CYMBALTA) 30 MG extended release capsule Take 3 capsules by mouth daily 270 capsule 3    metoprolol tartrate (LOPRESSOR) 50 MG tablet TAKE 1 TABLET BY MOUTH TWICE A  tablet 3    olmesartan (BENICAR) 20 MG tablet TAKE 1/2 TABLET BY MOUTH EVERY DAY 45 tablet 2    amitriptyline (ELAVIL) 50 MG tablet TAKE 1 TABLET BY MOUTH NIGHTLY,MAY TAKE AN ADDITIONAL 50 MG AS NEEDED FOR INSOMNIA 180 tablet 1    albuterol sulfate HFA (PROVENTIL HFA) 108 (90 Base) MCG/ACT inhaler Inhale 2 puffs into the lungs every 4 hours as needed for Wheezing or Shortness of Breath 18 g 3    SUMAtriptan (IMITREX) 100 MG tablet Take 1 tablet by mouth once as needed for Migraine May repeat once after 2 hours if needed. No more than 2 per 24 hour period. 9 tablet 3    Cholecalciferol (VITAMIN D3) 2000 units CAPS Take by mouth       No current facility-administered medications for this visit. OBJECTIVE:  Physical Exam:  /84   Pulse 64   Wt 197 lb (89.4 kg)   BMI 32.78 kg/m²     GEN: AAOx3, in NAD, well-appearing  HEAD: normocephalic, atraumatic  EYES: no injection or icterus  CVS: RRR  LUNGS: in no acute respiratory distress, CTAB  Upper extremities:              Hands: no deformities, b/l MCP and PIP joints w/o swelling TTP, b/l DIP joints w/ mild synovitis TTP, full fist formation w/ good  strength              Wrist: b/l wrist joints w/o swelling NTTP, FROM              Elbow: no synovitis or bursitis, b/l lateral epicondyles slightly TTP, FROM  Lower extremities:              Knees: no warmth or effusion present, FROM              Ankles: no synovitis, FROM, Achilles tendons w/o swelling or warmth NTTP              Feet: no toe swelling or pain or warmth on palpation w/ FROM, negative MTP squeeze test b/l  INTEGUMENT: no active psoriatic lesions, no nail pitting, no patchy alopecia, no other rash, petechiae, bruises, or palpable purpura, no clubbing or digital ulcers    DATA:  Labs:   I personally reviewed interval labs and discussed w/ the pt in detail which showed:    Lab Results   Component Value Date    WBC 3.8 (L) 08/19/2022    HGB 12.7 08/19/2022    HCT 38.9 08/19/2022    MCV 99.6 08/19/2022     08/19/2022    LYMPHOPCT 35.8 08/19/2022    RBC 3.91 (L) 08/19/2022    MCH 32.6 08/19/2022    MCHC 32.7 08/19/2022    RDW 14.8 08/19/2022     Lab Results   Component Value Date     08/19/2022    K 4.6 08/19/2022    CL 99 08/19/2022    CO2 27 08/19/2022    BUN 12 08/19/2022    CREATININE 0.7 08/19/2022    GLUCOSE 86 08/19/2022    CALCIUM 9.8 08/19/2022    PROT 6.1 (L) 07/15/2022    LABALBU 4.2 07/15/2022 BILITOT <0.2 07/15/2022    ALKPHOS 97 07/15/2022    AST 15 07/15/2022    ALT 15 07/15/2022    LABGLOM >60 08/19/2022    GFRAA >60 08/19/2022    AGRATIO 2.6 (H) 09/25/2020    GLOB 1.7 09/25/2020     Lab Results   Component Value Date    COLORU YELLOW 12/22/2021    CLARITYU Clear 12/22/2021    GLUCOSEU Negative 12/22/2021    BILIRUBINUR SMALL (A) 12/22/2021    KETUA Negative 12/22/2021    SPECGRAV 1.019 12/22/2021    BLOODU Negative 12/22/2021    PHUR 6.5 12/22/2021    PROTEINU Negative 12/22/2021    UROBILINOGEN 0.2 12/22/2021    NITRU Negative 12/22/2021    LEUKOCYTESUR Negative 12/22/2021    LABMICR Not Indicated 12/22/2021    URINETYPE [employer] 12/22/2021    HYALCAST 1 12/22/2021    WBCUA 2 12/22/2021    RBCUA 4 12/22/2021    EPIU 1 12/22/2021     Lab Results   Component Value Date    VITD25 49.9 08/19/2022     Lab Results   Component Value Date    C3 136.3 02/25/2022    C3 141.3 07/24/2019     Lab Results   Component Value Date    C4 22.5 02/25/2022    C4 23.3 07/24/2019     No results found for: ANTIDSDNAIGG     No results found for: [facility]     Lab Results   Component Value Date    CRP <3.0 04/07/2022    CRP <3.0 12/22/2021    CRP 4.1 11/03/2021    CRP <0.3 03/04/2020     Lab Results   Component Value Date    SEDRATE 10 03/04/2020    SEDRATE 7 07/24/2019     No results found for: CKTOTAL    Negative RF, CCP (7/24/19)  Negative HLA B27 (7/24/19)  Negative JULIANO x 3 (7/24/19, 3/4/20, 2/25/22)  Negative SSA, SSB (7/24/19)  Negative hepatitis B and C serologies (7/24/19)  Negative Quantiferon TB (4/7/22)    Imaging:  I personally reviewed interval imaging and discussed w/ the pt in detail which included:    MRI L-spine (7/10/19): Mild neural foraminal narrowing. No significant spinal canal stenosis. X-rays (7/24/19):  R hand:  No fracture or dislocation. No underlying degenerative changes. Joint spaces are normal with no significant osteophytes. No inflammatory changes. No erosions. No soft tissue swelling. L hand:  No fracture or dislocation. No underlying degenerative changes. Joint spaces are normal with no significant osteophytes. No inflammatory changes. No erosions. No soft tissue swelling. R knee:  No fracture or dislocation. No underlying degenerative changes. Joint spaces are normal with no significant osteophytes. No inflammatory changes. No erosions. No soft tissue swelling. L knee:  No fracture or dislocation. No underlying degenerative changes. Joint spaces are normal with no significant osteophytes. No inflammatory changes. No erosions. No soft tissue swelling. SI joints:  Normal, symmetric appearance of the b/l SI joints. No widening or significant sclerosis. No erosions are appreciated. Pelvic bones are otherwise unremarkable. No significant soft tissue findings. I independently reviewed above X-rays - mild OA changes in the b/l PIP joints otherwise well preserved joint spaces, no significant juxta-articular osteopenia, no erosive changes seen. Knees w/ mild medial joint space narrowing, no chondrocalcinosis. SI joints patent w/o erosive changes, some sclerosis. MRI R hand (9/3/19)  No erosive changes or synovitis. MRI pelvis (9/3/19): No evidence of active sacroiliitis. Above results were discussed w/ the pt in detail during today's visit.

## 2022-10-12 ENCOUNTER — OFFICE VISIT (OUTPATIENT)
Dept: RHEUMATOLOGY | Age: 57
End: 2022-10-12

## 2022-10-12 ENCOUNTER — TELEPHONE (OUTPATIENT)
Dept: RHEUMATOLOGY | Age: 57
End: 2022-10-12

## 2022-10-12 VITALS
DIASTOLIC BLOOD PRESSURE: 84 MMHG | WEIGHT: 197 LBS | SYSTOLIC BLOOD PRESSURE: 124 MMHG | HEART RATE: 64 BPM | BODY MASS INDEX: 32.78 KG/M2

## 2022-10-12 DIAGNOSIS — M35.00 SJOGREN SYNDROME, UNSPECIFIED (HCC): ICD-10-CM

## 2022-10-12 DIAGNOSIS — L40.50 PSORIATIC ARTHRITIS (HCC): Primary | ICD-10-CM

## 2022-10-12 DIAGNOSIS — L40.9 PSORIASIS: ICD-10-CM

## 2022-10-12 DIAGNOSIS — Z79.899 HIGH RISK MEDICATION USE: ICD-10-CM

## 2022-10-12 PROCEDURE — 99214 OFFICE O/P EST MOD 30 MIN: CPT | Performed by: INTERNAL MEDICINE

## 2022-10-12 RX ORDER — METHOTREXATE 2.5 MG/1
12.5 TABLET ORAL WEEKLY
Qty: 60 TABLET | Refills: 0 | Status: CANCELLED | OUTPATIENT
Start: 2022-10-12 | End: 2023-01-10

## 2022-10-12 RX ORDER — SULFASALAZINE 500 MG/1
1000 TABLET ORAL 2 TIMES DAILY
Qty: 360 TABLET | Refills: 0 | Status: SHIPPED | OUTPATIENT
Start: 2022-10-12 | End: 2023-01-10

## 2022-10-12 RX ORDER — CEVIMELINE HYDROCHLORIDE 30 MG/1
30 CAPSULE ORAL 3 TIMES DAILY
Qty: 270 CAPSULE | Refills: 0 | Status: SHIPPED | OUTPATIENT
Start: 2022-10-12 | End: 2023-01-10

## 2022-10-12 RX ORDER — PILOCARPINE HYDROCHLORIDE 5 MG/1
5 TABLET, FILM COATED ORAL 3 TIMES DAILY
Qty: 270 TABLET | Refills: 0 | Status: CANCELLED | OUTPATIENT
Start: 2022-10-12 | End: 2023-01-10

## 2022-10-12 RX ORDER — FOLIC ACID 1 MG/1
1 TABLET ORAL DAILY
Qty: 90 TABLET | Refills: 0 | Status: CANCELLED | OUTPATIENT
Start: 2022-10-12

## 2022-10-12 NOTE — ASSESSMENT & PLAN NOTE
- inflammatory arthritis remains mildly active on SSZ.   - unable to tolerate MTX d/t tongue pain and leukopenia. - submitted PA for Ustekinumab in 5/2022, pt stated her new insurance \"wouldn't cover it d/t my pre-existing condition\". I'm unclear about this, we did not receive any denial letter. Discussed additional paperwork needing her signature for PA - pt agreed to work on this today.

## 2022-10-12 NOTE — ASSESSMENT & PLAN NOTE
- safety labs for SSZ toxicity monitoring Q3mo. - discussed her immunocompromised state and indications to hold SSZ.

## 2022-10-12 NOTE — ASSESSMENT & PLAN NOTE
- chronic sicca, pt stated her ophthalmologist confirmed her eye dryness. - xerostomia w/ partial relief from OTC artificial tears and Biotene products and Pilocarpine. She will switch Pilocarpine to Cevimeline 30 mg TID.

## 2022-10-19 DIAGNOSIS — G43.109 MIGRAINE WITH AURA AND WITHOUT STATUS MIGRAINOSUS, NOT INTRACTABLE: ICD-10-CM

## 2022-10-19 RX ORDER — AMITRIPTYLINE HYDROCHLORIDE 50 MG/1
TABLET, FILM COATED ORAL
Qty: 180 TABLET | Refills: 1 | Status: SHIPPED | OUTPATIENT
Start: 2022-10-19

## 2022-10-28 ENCOUNTER — TELEPHONE (OUTPATIENT)
Dept: INTERNAL MEDICINE CLINIC | Age: 57
End: 2022-10-28

## 2022-11-28 RX ORDER — OLMESARTAN MEDOXOMIL 20 MG/1
TABLET ORAL
Qty: 45 TABLET | Refills: 2 | Status: SHIPPED | OUTPATIENT
Start: 2022-11-28

## 2022-12-01 DIAGNOSIS — M35.00 SJOGREN SYNDROME, UNSPECIFIED (HCC): ICD-10-CM

## 2022-12-01 RX ORDER — CEVIMELINE HYDROCHLORIDE 30 MG/1
30 CAPSULE ORAL 3 TIMES DAILY
Qty: 60 CAPSULE | Refills: 0 | Status: SHIPPED | OUTPATIENT
Start: 2022-12-01 | End: 2023-03-01

## 2022-12-02 DIAGNOSIS — Z79.899 HIGH RISK MEDICATION USE: ICD-10-CM

## 2022-12-02 DIAGNOSIS — M35.00 SJOGREN SYNDROME, UNSPECIFIED (HCC): ICD-10-CM

## 2022-12-02 LAB
ALT SERPL-CCNC: 13 U/L (ref 10–40)
AST SERPL-CCNC: 14 U/L (ref 15–37)
BASOPHILS ABSOLUTE: 0 K/UL (ref 0–0.2)
BASOPHILS RELATIVE PERCENT: 0.6 %
CREAT SERPL-MCNC: 0.7 MG/DL (ref 0.6–1.1)
EOSINOPHILS ABSOLUTE: 0.1 K/UL (ref 0–0.6)
EOSINOPHILS RELATIVE PERCENT: 2.4 %
GFR SERPL CREATININE-BSD FRML MDRD: >60 ML/MIN/{1.73_M2}
HCT VFR BLD CALC: 39.2 % (ref 36–48)
HEMOGLOBIN: 12.7 G/DL (ref 12–16)
LYMPHOCYTES ABSOLUTE: 1.6 K/UL (ref 1–5.1)
LYMPHOCYTES RELATIVE PERCENT: 39.9 %
MCH RBC QN AUTO: 32.5 PG (ref 26–34)
MCHC RBC AUTO-ENTMCNC: 32.4 G/DL (ref 31–36)
MCV RBC AUTO: 100.1 FL (ref 80–100)
MONOCYTES ABSOLUTE: 0.3 K/UL (ref 0–1.3)
MONOCYTES RELATIVE PERCENT: 8.3 %
NEUTROPHILS ABSOLUTE: 2 K/UL (ref 1.7–7.7)
NEUTROPHILS RELATIVE PERCENT: 48.8 %
PDW BLD-RTO: 14.2 % (ref 12.4–15.4)
PLATELET # BLD: 310 K/UL (ref 135–450)
PMV BLD AUTO: 8.2 FL (ref 5–10.5)
RBC # BLD: 3.91 M/UL (ref 4–5.2)
WBC # BLD: 4 K/UL (ref 4–11)

## 2022-12-10 NOTE — PROGRESS NOTES
Minoo Jamil MD  [facility]) Physicians - Rheumatology    [x] [facility], [place]:  [facility] [address] [facility] [] [address]:  [address], [address]   Office: [phone]  Fax: [phone]     RHEUMATOLOGY PROGRESS NOTE    ASSESSMENT/PLAN:  Maya Mñuiz is a 62 y.o. female w/ PsA, PsO, secondary Sjogren syndrome and fibromyalgia previously followed by rheumatologist (Dr. Re Duran) in [place]. PMHx pertinent for chronic back pain from DDD, spondylosis and facet arthropathy of L-spine (follows w/ Dr. Kang Landeros), KARTHIK noncompliant w/ CPAP, HTN, pre-diabetes, fatty liver, IBS, depression and anxiety. Per prior rheumatologist, Dr. Felicitas George note from 1/19/17: Dx of PsA on Humira \"was doing well initially on Humira, still flares, no synovitis\", Humira was switched to Stelara in 3/17. PsA was noted to improve on Stelara per rheumatologist note from 6/29/17. Reviewed  Dermatology note from 9/17/18, Dx of PsO w/ PsA \"previously on many biologics per Rheumatology\", exam at the time revealed \"palms w/ few pink thin scaling plaques\".      Current rheum meds:  SSZ 1000 mg BID: started in 3/2020  Cevimeline 30 mg TID  Duloxetine and Amitriptyline: prescribed by PCP  OTC Biotene products, artificial tears     Prior rheum meds:  MTX: per pt oral MTX caused thrush, she thinks this \"worked a little bit\", resumed in 11/2021, developed leukopenia on 17.5 mg PO wkly, self d/c'ed on 8/1/22 d/t sore tongue denied aphthous ulcers  Adalimumab: per pt this was [employer]" but it stopped working after a few months  Ustekinumab: per pt this worked mainly for her joints - she felt this worked but stopped after two months d/t insurance issues, submitted PA again in 5/2022, pt stated she was reportedly told her new insurance \"wouldn't cover it because of my pre-existing condition\"  Pilocarpine 5 mg TID: provided partial relief for xerostomia  Cyclobenzaprine: ineffective for back pain  Gabapentin: does not remember response  Meloxicam provided short term pain relief but worsened her GERD, she thinks she took Celecoxib for a couple of yrs in her 30s  Celecoxib 100 mg BID: caused GERD  Medrol dose pack: significantly improves joint pain    1. Psoriatic arthritis ([address]  Assessment & Plan:  - inflammatory arthritis remains mildly active on SSZ.   - unable to tolerate MTX d/t tongue pain and leukopenia. Submitted PA for Ustekinumab in 5/2022, pt is currently working on getting pt assistance through the drug company. Orders:  -     sulfaSALAzine (AZULFIDINE) 500 MG tablet; Take 2 tablets by mouth 2 times daily, Disp-360 tablet, R-0Normal  2. Psoriasis  Assessment & Plan:  - skin remains clear off MTX.  - resume Ustekinumab as above #1.  3. Sjogren syndrome, unspecified (HCC)  Assessment & Plan:  - chronic sicca, pt stated her ophthalmologist confirmed her eye dryness. - xerostomia w/ partial relief from OTC artificial tears and Biotene products and Pilocarpine. Sicca well controlled on Cevimeline 30 mg TID. Orders:  -     cevimeline (EVOXAC) 30 MG capsule; Take 1 capsule by mouth 3 times daily, Disp-270 capsule, R-0Normal  4. High risk medication use  Assessment & Plan:  - safety labs for SSZ toxicity monitoring Q3mo. - discussed her immunocompromised state and indications to hold SSZ. Orders:  -     ALT; Future  -     AST; Future  -     CBC with Auto Differential; Future  -     Creatinine; Future    Return in about 3 months (around 3/14/2023) for lab result discussion and treatment plan, medication monitoring. The risks and benefits of my recommendations, as well as other treatment options, benefits and side effects were discussed with the patient today. Questions were answered. NOTE: This report is transcribed by using voice recognition software dragon.  Every effort is made to ensure accuracy; however, inadvertent computerized  transcription errors may be present. SUBJECTIVE:  Past medical/surgical history, medications and allergies are reviewed and updated as appropriate. Interval Hx:   Pt reports well-controlled joint Sx on  mg BID. She tells me that she has been working on financial assistance for Ustekinumab. She denies any significant joint or finger swelling. Morning stiffness lasts less than 1 hour. She reports chronic mechanical LBP. Skin remains clear. Pt states she plans to change her medical insurance starting on 23. Rheumatologic ROS:  Constitutional: denies chronic fatigue, fever/chills, night sweats, unintentional weight loss  Integumentary: +chronic diffuse hair thinning, denies photosensitivity, rash, or Sx of Raynaud's phenomenon  Eyes: +dry eyes, denies redness or pain, visual disturbance, or floaters  Nose: denies nasal ulcers or recurrent sinusitis  Oral cavity: +dry mouth, denies oral ulcers  Cardiovascular: denies CP, palpitations, Hx of pericardial effusion or pericarditis  Respiratory: denies SOB, cough, hemoptysis, or pleurisy  Gastrointestinal: +chronic GERD refer to above HPI, denies chronic diarrhea or bloody stools  Musculoskeletal:  refer to above HPI     Allergies   Allergen Reactions    Norco [Hydrocodone-Acetaminophen] Other (See Comments)     Bad headache       Past Medical History:        Diagnosis Date    Chronic depression     DDD (degenerative disc disease), lumbar     Fatty liver     MRI 3/2021 mild    Fibromyalgia     Hiatal hernia     HTN (hypertension)     Obesity (BMI 30.0-34. 9)     BMI 32.68     KARTHIK (obstructive sleep apnea)     has mask, doesn't wear it    Pneumonia     in her 35s had pna B 7 yrs in a row    Psoriatic arthritis [facility])        Past Surgical History:        Procedure Laterality Date    BLADDER SUSPENSION  2004     SECTION  08/31/1987    x1    CHOLECYSTECTOMY  2000    ENDOMETRIAL ABLATION  2007    HIATAL HERNIA REPAIR N/A 2021    LAPAROSCOPIC PARAESOPHAGEAL HIATAL HERNIA REPAIR, NISSEN FUNDOPLICATION performed by Jones Lazaro MD at [address] [facility], TOTAL ABDOMINAL (CERVIX REMOVED)  2009    heavy menses    NASAL SEPTUM SURGERY  2005    NERVE SURGERY Bilateral 11/6/2019    BILATERAL L4-5 AND L5-S1 LUMBAR FACET INJECTIONS WITH FLUOROSCOPY performed by Bhargavi Gold MD at [address]  2007       Medications:    Current Outpatient Medications   Medication Sig Dispense Refill    cevimeline (EVOXAC) 30 MG capsule Take 1 capsule by mouth 3 times daily 270 capsule 0    sulfaSALAzine (AZULFIDINE) 500 MG tablet Take 2 tablets by mouth 2 times daily 360 tablet 0    amitriptyline (ELAVIL) 50 MG tablet TAKE 1 TABLET BY MOUTH NIGHTLY,MAY TAKE AN ADDITIONAL 50 MG AS NEEDED FOR INSOMNIA 180 tablet 1    ustekinumab (STELARA) 45 MG/0.5ML SOSY prefilled syringe Start 45 mg SC x 1 on wk 0, 4 then Q12wk. 1 mL 0    busPIRone (BUSPAR) 5 MG tablet TAKE 1 TABLET BY MOUTH 3 TIMES DAILY AS NEEDED (ANXIETY). 90 tablet 3    DULoxetine (CYMBALTA) 30 MG extended release capsule Take 3 capsules by mouth daily 270 capsule 3    metoprolol tartrate (LOPRESSOR) 50 MG tablet TAKE 1 TABLET BY MOUTH TWICE A  tablet 3    albuterol sulfate HFA (PROVENTIL HFA) 108 (90 Base) MCG/ACT inhaler Inhale 2 puffs into the lungs every 4 hours as needed for Wheezing or Shortness of Breath 18 g 3    SUMAtriptan (IMITREX) 100 MG tablet Take 1 tablet by mouth once as needed for Migraine May repeat once after 2 hours if needed. No more than 2 per 24 hour period. 9 tablet 3    Cholecalciferol (VITAMIN D3) 2000 units CAPS Take by mouth       No current facility-administered medications for this visit.         OBJECTIVE:  Physical Exam:  /78   Pulse 68   Wt 198 lb (89.8 kg)   BMI 32.95 kg/m²     GEN: AAOx3, in NAD, well-appearing  HEAD: normocephalic, atraumatic  EYES: no injection or icterus  CVS: RRR  LUNGS: in no acute respiratory distress, CTAB  Upper extremities: Hands: no deformities, no synovitis or dactylitis, b/l MCP joints w/o synovitis NTTP, b/l PIP and DIP joints slightly boggy NTTP, full fist formation w/ good  strength              Wrist: b/l wrist joints w/o swelling NTTP, FROM              Elbow: no synovitis or bursitis, b/l lateral epicondyles slightly TTP, FROM  Lower extremities:              Knees: no warmth or effusion present, FROM              Ankles: no synovitis, FROM, Achilles tendons w/o swelling or warmth NTTP              Feet: no toe swelling or pain or warmth on palpation w/ FROM, negative MTP squeeze test b/l  INTEGUMENT: no active psoriatic lesions, no nail pitting, no patchy alopecia, no other rash, petechiae, bruises, or palpable purpura, no clubbing or digital ulcers    DATA:  Labs:   I personally reviewed interval labs and discussed w/ the pt in detail which showed:    Lab Results   Component Value Date    WBC 4.0 12/02/2022    HGB 12.7 12/02/2022    HCT 39.2 12/02/2022    .1 (H) 12/02/2022     12/02/2022    LYMPHOPCT 39.9 12/02/2022    RBC 3.91 (L) 12/02/2022    MCH 32.5 12/02/2022    MCHC 32.4 12/02/2022    RDW 14.2 12/02/2022     Lab Results   Component Value Date     08/19/2022    K 4.6 08/19/2022    CL 99 08/19/2022    CO2 27 08/19/2022    BUN 12 08/19/2022    CREATININE 0.7 12/02/2022    GLUCOSE 86 08/19/2022    CALCIUM 9.8 08/19/2022    PROT 6.1 (L) 07/15/2022    LABALBU 4.2 07/15/2022    BILITOT <0.2 07/15/2022    ALKPHOS 97 07/15/2022    AST 14 (L) 12/02/2022    ALT 13 12/02/2022    LABGLOM >60 12/02/2022    GFRAA >60 08/19/2022    AGRATIO 2.6 (H) 09/25/2020    GLOB 1.7 09/25/2020     Lab Results   Component Value Date    COLORU YELLOW 12/22/2021    CLARITYU Clear 12/22/2021    GLUCOSEU Negative 12/22/2021    BILIRUBINUR SMALL (A) 12/22/2021    KETUA Negative 12/22/2021    SPECGRAV 1.019 12/22/2021    BLOODU Negative 12/22/2021    PHUR 6.5 12/22/2021    PROTEINU Negative 12/22/2021    UROBILINOGEN 0.2 12/22/2021    NITRU Negative 12/22/2021    LEUKOCYTESUR Negative 12/22/2021    LABMICR Not Indicated 12/22/2021    URINETYPE [employer] 12/22/2021    HYALCAST 1 12/22/2021    WBCUA 2 12/22/2021    RBCUA 4 12/22/2021    EPIU 1 12/22/2021     Lab Results   Component Value Date    VITD25 49.9 08/19/2022     Lab Results   Component Value Date    C3 136.3 02/25/2022    C3 141.3 07/24/2019     Lab Results   Component Value Date    C4 22.5 02/25/2022    C4 23.3 07/24/2019     No results found for: ANTIDSDNAIGG     No results found for: [facility]     Lab Results   Component Value Date    CRP <3.0 04/07/2022    CRP <3.0 12/22/2021    CRP 4.1 11/03/2021    CRP <0.3 03/04/2020     Lab Results   Component Value Date    SEDRATE 10 03/04/2020    SEDRATE 7 07/24/2019     No results found for: CKTOTAL    Negative RF, CCP (7/24/19)  Negative HLA B27 (7/24/19)  Negative JULIANO x 3 (7/24/19, 3/4/20, 2/25/22)  Negative SSA, SSB (7/24/19)  Negative hepatitis B and C serologies (7/24/19)  Negative Quantiferon TB (4/7/22)    Imaging:  I personally reviewed interval imaging and discussed w/ the pt in detail which included:    MRI L-spine (7/10/19): Mild neural foraminal narrowing. No significant spinal canal stenosis. X-rays (7/24/19):  R hand:  No fracture or dislocation. No underlying degenerative changes. Joint spaces are normal with no significant osteophytes. No inflammatory changes. No erosions. No soft tissue swelling. L hand:  No fracture or dislocation. No underlying degenerative changes. Joint spaces are normal with no significant osteophytes. No inflammatory changes. No erosions. No soft tissue swelling. R knee:  No fracture or dislocation. No underlying degenerative changes. Joint spaces are normal with no significant osteophytes. No inflammatory changes. No erosions. No soft tissue swelling. L knee:  No fracture or dislocation. No underlying degenerative changes.  Joint spaces are normal with no significant osteophytes. No inflammatory changes. No erosions. No soft tissue swelling. SI joints:  Normal, symmetric appearance of the b/l SI joints. No widening or significant sclerosis. No erosions are appreciated. Pelvic bones are otherwise unremarkable. No significant soft tissue findings. I independently reviewed above X-rays - mild OA changes in the b/l PIP joints otherwise well preserved joint spaces, no significant juxta-articular osteopenia, no erosive changes seen. Knees w/ mild medial joint space narrowing, no chondrocalcinosis. SI joints patent w/o erosive changes, some sclerosis. MRI R hand (9/3/19)  No erosive changes or synovitis. MRI pelvis (9/3/19): No evidence of active sacroiliitis. Above results were discussed w/ the pt in detail during today's visit.

## 2022-12-14 ENCOUNTER — OFFICE VISIT (OUTPATIENT)
Dept: RHEUMATOLOGY | Age: 57
End: 2022-12-14

## 2022-12-14 VITALS
HEART RATE: 68 BPM | SYSTOLIC BLOOD PRESSURE: 118 MMHG | DIASTOLIC BLOOD PRESSURE: 78 MMHG | WEIGHT: 198 LBS | BODY MASS INDEX: 32.95 KG/M2

## 2022-12-14 DIAGNOSIS — Z79.899 HIGH RISK MEDICATION USE: ICD-10-CM

## 2022-12-14 DIAGNOSIS — L40.9 PSORIASIS: ICD-10-CM

## 2022-12-14 DIAGNOSIS — L40.50 PSORIATIC ARTHRITIS (HCC): Primary | ICD-10-CM

## 2022-12-14 DIAGNOSIS — M35.00 SJOGREN SYNDROME, UNSPECIFIED (HCC): ICD-10-CM

## 2022-12-14 PROCEDURE — 99214 OFFICE O/P EST MOD 30 MIN: CPT | Performed by: INTERNAL MEDICINE

## 2022-12-14 PROCEDURE — 3078F DIAST BP <80 MM HG: CPT | Performed by: INTERNAL MEDICINE

## 2022-12-14 PROCEDURE — 3074F SYST BP LT 130 MM HG: CPT | Performed by: INTERNAL MEDICINE

## 2022-12-14 RX ORDER — PILOCARPINE HYDROCHLORIDE 5 MG/1
TABLET, FILM COATED ORAL
COMMUNITY
Start: 2022-11-28 | End: 2022-12-14

## 2022-12-14 RX ORDER — SULFASALAZINE 500 MG/1
1000 TABLET ORAL 2 TIMES DAILY
Qty: 360 TABLET | Refills: 0 | Status: SHIPPED | OUTPATIENT
Start: 2022-12-14 | End: 2023-03-14

## 2022-12-14 RX ORDER — CEVIMELINE HYDROCHLORIDE 30 MG/1
30 CAPSULE ORAL 3 TIMES DAILY
Qty: 270 CAPSULE | Refills: 0 | Status: SHIPPED | OUTPATIENT
Start: 2022-12-14 | End: 2023-03-14

## 2022-12-14 NOTE — ASSESSMENT & PLAN NOTE
- chronic sicca, pt stated her ophthalmologist confirmed her eye dryness. - xerostomia w/ partial relief from OTC artificial tears and Biotene products and Pilocarpine. Sicca well controlled on Cevimeline 30 mg TID.

## 2022-12-14 NOTE — ASSESSMENT & PLAN NOTE
- inflammatory arthritis remains mildly active on SSZ.   - unable to tolerate MTX d/t tongue pain and leukopenia. Submitted PA for Ustekinumab in 5/2022, pt is currently working on getting pt assistance through the drug company.

## 2023-01-27 RX ORDER — METOPROLOL TARTRATE 50 MG/1
TABLET, FILM COATED ORAL
Qty: 180 TABLET | Refills: 3 | Status: SHIPPED | OUTPATIENT
Start: 2023-01-27

## 2023-02-08 DIAGNOSIS — L40.50 PSORIATIC ARTHRITIS (HCC): ICD-10-CM

## 2023-02-08 DIAGNOSIS — L40.9 PSORIASIS: ICD-10-CM

## 2023-02-08 NOTE — TELEPHONE ENCOUNTER
Medication approved. Patients pharmacy benefit requires patient to use [facility]. Please send scripts to preferred pharmacy. Pended RX for loading dose and maintenance dose.

## 2023-03-01 ENCOUNTER — OFFICE VISIT (OUTPATIENT)
Dept: INTERNAL MEDICINE CLINIC | Age: 58
End: 2023-03-01

## 2023-03-01 VITALS
OXYGEN SATURATION: 95 % | DIASTOLIC BLOOD PRESSURE: 78 MMHG | HEIGHT: 65 IN | SYSTOLIC BLOOD PRESSURE: 124 MMHG | WEIGHT: 196.2 LBS | BODY MASS INDEX: 32.69 KG/M2 | HEART RATE: 72 BPM

## 2023-03-01 DIAGNOSIS — R73.03 PRE-DIABETES: ICD-10-CM

## 2023-03-01 DIAGNOSIS — F41.9 ANXIETY: ICD-10-CM

## 2023-03-01 DIAGNOSIS — M35.00 SJOGREN SYNDROME, UNSPECIFIED (HCC): ICD-10-CM

## 2023-03-01 DIAGNOSIS — E78.5 MILD HYPERLIPIDEMIA: ICD-10-CM

## 2023-03-01 DIAGNOSIS — F33.1 MODERATE EPISODE OF RECURRENT MAJOR DEPRESSIVE DISORDER (HCC): ICD-10-CM

## 2023-03-01 DIAGNOSIS — E53.8 B12 DEFICIENCY: ICD-10-CM

## 2023-03-01 DIAGNOSIS — I51.7 LVH (LEFT VENTRICULAR HYPERTROPHY): ICD-10-CM

## 2023-03-01 DIAGNOSIS — I10 ESSENTIAL HYPERTENSION: ICD-10-CM

## 2023-03-01 DIAGNOSIS — D72.819 CHRONIC LEUKOPENIA: Primary | ICD-10-CM

## 2023-03-01 PROCEDURE — 3078F DIAST BP <80 MM HG: CPT | Performed by: INTERNAL MEDICINE

## 2023-03-01 PROCEDURE — 3074F SYST BP LT 130 MM HG: CPT | Performed by: INTERNAL MEDICINE

## 2023-03-01 PROCEDURE — 90715 TDAP VACCINE 7 YRS/> IM: CPT | Performed by: INTERNAL MEDICINE

## 2023-03-01 PROCEDURE — 99214 OFFICE O/P EST MOD 30 MIN: CPT | Performed by: INTERNAL MEDICINE

## 2023-03-01 PROCEDURE — 90471 IMMUNIZATION ADMIN: CPT | Performed by: INTERNAL MEDICINE

## 2023-03-01 SDOH — ECONOMIC STABILITY: INCOME INSECURITY: HOW HARD IS IT FOR YOU TO PAY FOR THE VERY BASICS LIKE FOOD, HOUSING, MEDICAL CARE, AND HEATING?: NOT HARD AT ALL

## 2023-03-01 SDOH — ECONOMIC STABILITY: FOOD INSECURITY: WITHIN THE PAST 12 MONTHS, THE FOOD YOU BOUGHT JUST DIDN'T LAST AND YOU DIDN'T HAVE MONEY TO GET MORE.: NEVER TRUE

## 2023-03-01 SDOH — ECONOMIC STABILITY: TRANSPORTATION INSECURITY
IN THE PAST 12 MONTHS, HAS LACK OF TRANSPORTATION KEPT YOU FROM MEETINGS, WORK, OR FROM GETTING THINGS NEEDED FOR DAILY LIVING?: NO

## 2023-03-01 SDOH — ECONOMIC STABILITY: FOOD INSECURITY: WITHIN THE PAST 12 MONTHS, YOU WORRIED THAT YOUR FOOD WOULD RUN OUT BEFORE YOU GOT MONEY TO BUY MORE.: NEVER TRUE

## 2023-03-01 SDOH — ECONOMIC STABILITY: HOUSING INSECURITY
IN THE LAST 12 MONTHS, WAS THERE A TIME WHEN YOU DID NOT HAVE A STEADY PLACE TO SLEEP OR SLEPT IN A SHELTER (INCLUDING NOW)?: NO

## 2023-03-01 ASSESSMENT — PATIENT HEALTH QUESTIONNAIRE - PHQ9
5. POOR APPETITE OR OVEREATING: 2
3. TROUBLE FALLING OR STAYING ASLEEP: 0
2. FEELING DOWN, DEPRESSED OR HOPELESS: 0
SUM OF ALL RESPONSES TO PHQ QUESTIONS 1-9: 2
8. MOVING OR SPEAKING SO SLOWLY THAT OTHER PEOPLE COULD HAVE NOTICED. OR THE OPPOSITE, BEING SO FIGETY OR RESTLESS THAT YOU HAVE BEEN MOVING AROUND A LOT MORE THAN USUAL: 0
1. LITTLE INTEREST OR PLEASURE IN DOING THINGS: 0
7. TROUBLE CONCENTRATING ON THINGS, SUCH AS READING THE NEWSPAPER OR WATCHING TELEVISION: 0
4. FEELING TIRED OR HAVING LITTLE ENERGY: 0
SUM OF ALL RESPONSES TO PHQ9 QUESTIONS 1 & 2: 0
SUM OF ALL RESPONSES TO PHQ QUESTIONS 1-9: 2
SUM OF ALL RESPONSES TO PHQ QUESTIONS 1-9: 2
9. THOUGHTS THAT YOU WOULD BE BETTER OFF DEAD, OR OF HURTING YOURSELF: 0
10. IF YOU CHECKED OFF ANY PROBLEMS, HOW DIFFICULT HAVE THESE PROBLEMS MADE IT FOR YOU TO DO YOUR WORK, TAKE CARE OF THINGS AT HOME, OR GET ALONG WITH OTHER PEOPLE: 0
6. FEELING BAD ABOUT YOURSELF - OR THAT YOU ARE A FAILURE OR HAVE LET YOURSELF OR YOUR FAMILY DOWN: 0
SUM OF ALL RESPONSES TO PHQ QUESTIONS 1-9: 2

## 2023-03-01 NOTE — PROGRESS NOTES
Patient: Isaac Hurst is a 62 y.o. female who presents today with the following Chief Complaint(s):  Chief Complaint   Patient presents with    6 Month Follow-Up       HPI    Here today for follow up. Doing well. Is expecting 2 grandchildren- due in  (middle daughter who has struggled with addiction but is clean and sober) and August (oldest daughter). Psoriatic arthritis and Sjorgren's- needs to f/u with Dr. Leatha Romo. HTN w/LVH- does need to f/u with Dr. Gabriela Jacobo (last saw him 22). Had an episode last night when she got into bed, felt like she had just drank a lot of caffeine, entire body felt tense and \"kind of frozen in spots but not\". Does not think that she would be able to stand. No CP, no SOB, no palpitations. No anxiety. Did not miss any doses of Cymbalta. Depression and anxiety- doing well on Cymbalta 30 mg am/60 mg pm and Buspar 5 mg BID which is working well for her. Allergies   Allergen Reactions    Norco [Hydrocodone-Acetaminophen] Other (See Comments)     Bad headache      Past Medical History:   Diagnosis Date    Anxiety     Asthma     Exercise enduced    Chronic back pain     Chronic depression     DDD (degenerative disc disease), lumbar     Fatty liver     MRI 3/2021 mild    Fibromyalgia     Headache 200    Hiatal hernia     HTN (hypertension)     Hyperlipidemia     Irritable bowel syndrome 1995    Obesity (BMI 30.0-34. 9)     BMI 32.68     KARTHIK (obstructive sleep apnea)     has mask, doesn't wear it    Pneumonia     in her 35s had pna B 7 yrs in a row    Psoriatic arthritis [facility])       Past Surgical History:   Procedure Laterality Date    BLADDER SUSPENSION  2004     SECTION  08/31/1987    x1    CHOLECYSTECTOMY  2000    ENDOMETRIAL ABLATION  2007    HIATAL HERNIA REPAIR N/A 2021    LAPAROSCOPIC PARAESOPHAGEAL HIATAL HERNIA REPAIR, NISSEN FUNDOPLICATION performed by Yumiko Solis MD at [address] [place] [address] ABDOMINAL (CERVIX REMOVED)  2009    heavy menses    NASAL SEPTUM SURGERY  2005    NERVE SURGERY Bilateral 11/6/2019    BILATERAL L4-5 AND L5-S1 LUMBAR FACET INJECTIONS WITH FLUOROSCOPY performed by Armando Goodman MD at [address] [facility]  2007      Social History     Socioeconomic History    Marital status:      Spouse name: Not on file    Number of children: Not on file    Years of education: Not on file    Highest education level: Not on file   Occupational History    Not on file   Tobacco Use    Smoking status: Never    Smokeless tobacco: Never   Vaping Use    Vaping Use: Never used   Substance and Sexual Activity    Alcohol use: Not Currently     Comment: every month or so    Drug use: Not Currently     Types: Marijuana Syliva Ghada)     Comment: No longer    Sexual activity: Not Currently     Partners: Male     Comment: None since last year   Other Topics Concern    Not on file   Social History Narrative    Not on file     Social Determinants of Health     Financial Resource Strain: Low Risk     Difficulty of Paying Living Expenses: Not hard at all   Food Insecurity: No Food Insecurity    Worried About Running Out of Food in the Last Year: Never true    [address] in the Last Year: Never true   Transportation Needs: Unknown    Lack of Transportation (Medical): Not on file    Lack of Transportation (Non-Medical):  No   Physical Activity: Not on file   Stress: Not on file   Social Connections: Not on file   Intimate Partner Violence: Not on file   Housing Stability: Unknown    Unable to Pay for Housing in the Last Year: Not on file    Number of Places Lived in the Last Year: Not on file    Unstable Housing in the Last Year: No     Family History   Problem Relation Age of Onset    Cancer Mother         gallbladder cancer    High Blood Pressure Mother     Alcohol Abuse Mother     High Blood Pressure Father     Cancer Sister         small cell lung cancer (smoker)    Heart Disease Maternal Grandmother     Heart Disease Maternal Grandfather     Stroke Maternal Grandfather     Heart Disease Paternal Grandmother     Heart Disease Paternal Grandfather     No Known Problems Half-Brother     No Known Problems Half-Sister     Other Half-Sibling         skin cancer    Other Half-Sister         blood clots    Substance Abuse Daughter 32        in remission         Outpatient Medications Prior to Visit   Medication Sig Dispense Refill    ustekinumab (STELARA) 45 MG/0.5ML SOSY prefilled syringe Start 45 mg SC x 1 on wk 0 and week 4 2 mL 0    ustekinumab (STELARA) 45 MG/0.5ML SOSY prefilled syringe Inject 45 mg subcutaneously every 12 weeks. 1 mL 0    metoprolol tartrate (LOPRESSOR) 50 MG tablet TAKE 1 TABLET BY MOUTH TWICE A  tablet 3    cevimeline (EVOXAC) 30 MG capsule Take 1 capsule by mouth 3 times daily 270 capsule 0    sulfaSALAzine (AZULFIDINE) 500 MG tablet Take 2 tablets by mouth 2 times daily 360 tablet 0    amitriptyline (ELAVIL) 50 MG tablet TAKE 1 TABLET BY MOUTH NIGHTLY,MAY TAKE AN ADDITIONAL 50 MG AS NEEDED FOR INSOMNIA 180 tablet 1    busPIRone (BUSPAR) 5 MG tablet TAKE 1 TABLET BY MOUTH 3 TIMES DAILY AS NEEDED (ANXIETY). 90 tablet 3    DULoxetine (CYMBALTA) 30 MG extended release capsule Take 3 capsules by mouth daily 270 capsule 3    albuterol sulfate HFA (PROVENTIL HFA) 108 (90 Base) MCG/ACT inhaler Inhale 2 puffs into the lungs every 4 hours as needed for Wheezing or Shortness of Breath 18 g 3    SUMAtriptan (IMITREX) 100 MG tablet Take 1 tablet by mouth once as needed for Migraine May repeat once after 2 hours if needed. No more than 2 per 24 hour period. 9 tablet 3    Cholecalciferol (VITAMIN D3) 2000 units CAPS Take by mouth       No facility-administered medications prior to visit. Patient'spast medical history, surgical history, family history, medications,  and allergies  were all reviewed and updated as appropriate today.     Review of Systems   Constitutional:  Negative for appetite change, fatigue and fever. Respiratory:  Negative for chest tightness and shortness of breath. Cardiovascular:  Negative for chest pain. Gastrointestinal:  Negative for constipation and diarrhea. Skin:  Negative for rash. /78   Pulse 72   Ht 5' 5\" (1.651 m)   Wt 196 lb 3.2 oz (89 kg)   SpO2 95%   BMI 32.65 kg/m²   Physical Exam  Vitals and nursing note reviewed. Constitutional:       Appearance: She is well-developed. She is not toxic-appearing. HENT:      Head: Normocephalic. Right Ear: Tympanic membrane, ear canal and external ear normal.      Left Ear: Tympanic membrane, ear canal and external ear normal.      Mouth/Throat:      Pharynx: No oropharyngeal exudate or posterior oropharyngeal erythema. Eyes:      General: No scleral icterus. Extraocular Movements: Extraocular movements intact. Conjunctiva/sclera: Conjunctivae normal.      Pupils: Pupils are equal, round, and reactive to light. Neck:      Thyroid: No thyroid mass or thyromegaly. Vascular: No carotid bruit. Cardiovascular:      Rate and Rhythm: Normal rate and regular rhythm. Heart sounds: Normal heart sounds. No murmur heard. Pulmonary:      Effort: Pulmonary effort is normal.      Breath sounds: Normal breath sounds. Musculoskeletal:      Right lower leg: No edema. Left lower leg: No edema. Lymphadenopathy:      Cervical: No cervical adenopathy. Neurological:      General: No focal deficit present. Mental Status: She is alert and oriented to person, place, and time. Psychiatric:         Mood and Affect: Mood normal.         Behavior: Behavior normal. Behavior is cooperative. ASSESSMENT/PLAN:    Problem List Items Addressed This Visit       Anxiety      Doing very well on Cymbalta 30 mg in the morning/60 mg in the evening and BuSpar 5 mg twice daily. B12 deficiency      Check B12 level.          Relevant Orders    Vitamin B12 & Folate    Vitamin B12 & Folate    Chronic leukopenia - Primary      Check CBC. Had worsening leukopenia on methotrexate. Relevant Orders    Vitamin B12 & Folate    CBC with Auto Differential    Essential hypertension     Well-controlled. Continue metoprolol 50 mg twice daily. Relevant Orders    Comprehensive Metabolic Panel    CBC with Auto Differential    Comprehensive Metabolic Panel    CBC with Auto Differential    LVH (left ventricular hypertrophy)      Blood pressure is well controlled on metoprolol 50 mg twice daily. Did see Dr. Sisi Hobson in February 2022. Instructed patient to call and make a follow-up appointment. Mild hyperlipidemia     On no medication. Check lipid panel. Relevant Orders    Comprehensive Metabolic Panel    Lipid Panel    TSH with Reflex    Comprehensive Metabolic Panel    Lipid Panel    Moderate episode of recurrent major depressive disorder ([facility] [address]     Doing very well currently. Both of her daughters are expecting babies over the summer. Continue Cymbalta 30 mg in the morning/60 mg in the evening. Pre-diabetes      Check hemoglobin A1c. Relevant Orders    Hemoglobin A1C    Hemoglobin A1C    Sjogren syndrome, unspecified ([facility]      Follows with Dr. Jennifer Greene for rheumatology. Current Outpatient Medications   Medication Sig Dispense Refill    ustekinumab (STELARA) 45 MG/0.5ML SOSY prefilled syringe Start 45 mg SC x 1 on wk 0 and week 4 2 mL 0    ustekinumab (STELARA) 45 MG/0.5ML SOSY prefilled syringe Inject 45 mg subcutaneously every 12 weeks.  1 mL 0    metoprolol tartrate (LOPRESSOR) 50 MG tablet TAKE 1 TABLET BY MOUTH TWICE A  tablet 3    cevimeline (EVOXAC) 30 MG capsule Take 1 capsule by mouth 3 times daily 270 capsule 0    sulfaSALAzine (AZULFIDINE) 500 MG tablet Take 2 tablets by mouth 2 times daily 360 tablet 0    amitriptyline (ELAVIL) 50 MG tablet TAKE 1 TABLET BY MOUTH NIGHTLY,MAY TAKE AN ADDITIONAL 50 MG AS NEEDED FOR INSOMNIA 180 tablet 1    busPIRone (BUSPAR) 5 MG tablet TAKE 1 TABLET BY MOUTH 3 TIMES DAILY AS NEEDED (ANXIETY). 90 tablet 3    DULoxetine (CYMBALTA) 30 MG extended release capsule Take 3 capsules by mouth daily 270 capsule 3    albuterol sulfate HFA (PROVENTIL HFA) 108 (90 Base) MCG/ACT inhaler Inhale 2 puffs into the lungs every 4 hours as needed for Wheezing or Shortness of Breath 18 g 3    SUMAtriptan (IMITREX) 100 MG tablet Take 1 tablet by mouth once as needed for Migraine May repeat once after 2 hours if needed. No more than 2 per 24 hour period. 9 tablet 3    Cholecalciferol (VITAMIN D3) 2000 units CAPS Take by mouth       No current facility-administered medications for this visit. Return in about 6 months (around 9/1/2023) for labs prior.

## 2023-03-02 ASSESSMENT — ENCOUNTER SYMPTOMS
SHORTNESS OF BREATH: 0
CHEST TIGHTNESS: 0
DIARRHEA: 0
CONSTIPATION: 0

## 2023-03-02 NOTE — ASSESSMENT & PLAN NOTE
Blood pressure is well controlled on metoprolol 50 mg twice daily. Did see Dr. Chu Number in February 2022. Instructed patient to call and make a follow-up appointment.

## 2023-03-02 NOTE — ASSESSMENT & PLAN NOTE
Doing very well currently. Both of her daughters are expecting babies over the summer. Continue Cymbalta 30 mg in the morning/60 mg in the evening.

## 2023-03-03 RX ORDER — BUSPIRONE HYDROCHLORIDE 5 MG/1
5 TABLET ORAL 3 TIMES DAILY PRN
Qty: 90 TABLET | Refills: 3 | Status: SHIPPED | OUTPATIENT
Start: 2023-03-03

## 2023-03-03 NOTE — TELEPHONE ENCOUNTER
Future Appointments    Encounter Information    Provider Department Appt Notes   9/8/2023 Aldona Mortimer, [address] [facility] 6 month f/u     Past Visits    Date Provider Specialty Visit Type Primary Dx   03/01/2023 Aldona Mortimer, DO Internal Medicine Office Visit Chronic leukopenia

## 2023-03-24 ENCOUNTER — TELEPHONE (OUTPATIENT)
Dept: INTERNAL MEDICINE CLINIC | Age: 58
End: 2023-03-24

## 2023-03-24 DIAGNOSIS — Z78.0 MENOPAUSE: ICD-10-CM

## 2023-03-24 DIAGNOSIS — I10 ESSENTIAL HYPERTENSION: ICD-10-CM

## 2023-03-24 DIAGNOSIS — R73.03 PRE-DIABETES: ICD-10-CM

## 2023-03-24 DIAGNOSIS — Z12.4 CERVICAL CANCER SCREENING: ICD-10-CM

## 2023-03-24 DIAGNOSIS — E53.8 B12 DEFICIENCY: ICD-10-CM

## 2023-03-24 DIAGNOSIS — E78.5 MILD HYPERLIPIDEMIA: ICD-10-CM

## 2023-03-24 DIAGNOSIS — Z12.31 ENCOUNTER FOR SCREENING MAMMOGRAM FOR MALIGNANT NEOPLASM OF BREAST: Primary | ICD-10-CM

## 2023-03-24 LAB
ALBUMIN SERPL-MCNC: 4.3 G/DL (ref 3.4–5)
ALBUMIN/GLOB SERPL: 2.2 {RATIO} (ref 1.1–2.2)
ALP SERPL-CCNC: 96 U/L (ref 40–129)
ALT SERPL-CCNC: 13 U/L (ref 10–40)
ANION GAP SERPL CALCULATED.3IONS-SCNC: 13 MMOL/L (ref 3–16)
AST SERPL-CCNC: 15 U/L (ref 15–37)
BASOPHILS # BLD: 0 K/UL (ref 0–0.2)
BASOPHILS NFR BLD: 0.8 %
BILIRUB SERPL-MCNC: 0.4 MG/DL (ref 0–1)
BUN SERPL-MCNC: 10 MG/DL (ref 7–20)
CALCIUM SERPL-MCNC: 9.4 MG/DL (ref 8.3–10.6)
CHLORIDE SERPL-SCNC: 104 MMOL/L (ref 99–110)
CHOLEST SERPL-MCNC: 193 MG/DL (ref 0–199)
CO2 SERPL-SCNC: 24 MMOL/L (ref 21–32)
CREAT SERPL-MCNC: 0.7 MG/DL (ref 0.6–1.1)
DEPRECATED RDW RBC AUTO: 14 % (ref 12.4–15.4)
EOSINOPHIL # BLD: 0.1 K/UL (ref 0–0.6)
EOSINOPHIL NFR BLD: 1.9 %
FOLATE SERPL-MCNC: 7.99 NG/ML (ref 4.78–24.2)
GFR SERPLBLD CREATININE-BSD FMLA CKD-EPI: >60 ML/MIN/{1.73_M2}
GLUCOSE SERPL-MCNC: 103 MG/DL (ref 70–99)
HCT VFR BLD AUTO: 36.7 % (ref 36–48)
HDLC SERPL-MCNC: 52 MG/DL (ref 40–60)
HGB BLD-MCNC: 12.2 G/DL (ref 12–16)
LDLC SERPL CALC-MCNC: 128 MG/DL
LYMPHOCYTES # BLD: 1.3 K/UL (ref 1–5.1)
LYMPHOCYTES NFR BLD: 38.7 %
MCH RBC QN AUTO: 32.5 PG (ref 26–34)
MCHC RBC AUTO-ENTMCNC: 33.3 G/DL (ref 31–36)
MCV RBC AUTO: 97.6 FL (ref 80–100)
MONOCYTES # BLD: 0.3 K/UL (ref 0–1.3)
MONOCYTES NFR BLD: 8.6 %
NEUTROPHILS # BLD: 1.6 K/UL (ref 1.7–7.7)
NEUTROPHILS NFR BLD: 50 %
PLATELET # BLD AUTO: 295 K/UL (ref 135–450)
PMV BLD AUTO: 8 FL (ref 5–10.5)
POTASSIUM SERPL-SCNC: 4.9 MMOL/L (ref 3.5–5.1)
PROT SERPL-MCNC: 6.3 G/DL (ref 6.4–8.2)
RBC # BLD AUTO: 3.76 M/UL (ref 4–5.2)
SODIUM SERPL-SCNC: 141 MMOL/L (ref 136–145)
TRIGL SERPL-MCNC: 64 MG/DL (ref 0–150)
VIT B12 SERPL-MCNC: 744 PG/ML (ref 211–911)
VLDLC SERPL CALC-MCNC: 13 MG/DL
WBC # BLD AUTO: 3.3 K/UL (ref 4–11)

## 2023-03-24 NOTE — TELEPHONE ENCOUNTER
Pt came in asking to schedule a Mammogram. I did let her know that I could schedule a Mammogram for our [address] in May. However, I didn't see an order for a mammogram in her chart. If this is needed please place the order and let us know so we can call to get her on the schedule for May.

## 2023-03-24 NOTE — TELEPHONE ENCOUNTER
Pt is scheduled. Dr Elie Conner could you please put the order in. Will discuss with Dr Elie Conner when she returns to the office.

## 2023-03-25 LAB
EST. AVERAGE GLUCOSE BLD GHB EST-MCNC: 96.8 MG/DL
HBA1C MFR BLD: 5 %

## 2023-03-27 DIAGNOSIS — G43.109 MIGRAINE WITH AURA AND WITHOUT STATUS MIGRAINOSUS, NOT INTRACTABLE: ICD-10-CM

## 2023-03-27 RX ORDER — AMITRIPTYLINE HYDROCHLORIDE 50 MG/1
TABLET, FILM COATED ORAL
Qty: 180 TABLET | Refills: 1 | Status: SHIPPED | OUTPATIENT
Start: 2023-03-27

## 2023-03-29 ENCOUNTER — PATIENT MESSAGE (OUTPATIENT)
Dept: INTERNAL MEDICINE CLINIC | Age: 58
End: 2023-03-29

## 2023-03-30 NOTE — TELEPHONE ENCOUNTER
From: Aurelio Horner  To: Dr. Lunsford Come: 3/29/2023 12:05 PM EDT  Subject: Question regarding LIPID PANEL    Im waiting to hear about my bloodwork and if there are any needed changes for me.  Thank you

## 2023-05-10 ENCOUNTER — TELEPHONE (OUTPATIENT)
Dept: CARDIOTHORACIC SURGERY | Age: 58
End: 2023-05-10

## 2023-05-10 NOTE — TELEPHONE ENCOUNTER
LM for patient regarding schedule of updated CT chest without contrast for assessment of her pericardial cyst.

## 2023-05-10 NOTE — TELEPHONE ENCOUNTER
Spoke with patient regarding schedule of CT chest without contrast on 6/2/2023 at 8:30 am with arrival time of 8:00 am at [facility] for assessment of her pericardial cyst. Patient has been instructed to avoid metal items such as buttons, snaps, jewelry and under wires.

## 2023-05-13 ENCOUNTER — HOSPITAL ENCOUNTER (OUTPATIENT)
Dept: WOMENS IMAGING | Age: 58
Discharge: HOME | End: 2023-05-13
Payer: COMMERCIAL

## 2023-05-13 VITALS — HEIGHT: 65 IN | WEIGHT: 195 LBS | BODY MASS INDEX: 32.49 KG/M2

## 2023-05-13 DIAGNOSIS — F32.A CHRONIC DEPRESSION: ICD-10-CM

## 2023-05-13 DIAGNOSIS — Z12.31 ENCOUNTER FOR SCREENING MAMMOGRAM FOR MALIGNANT NEOPLASM OF BREAST: ICD-10-CM

## 2023-05-13 DIAGNOSIS — F41.9 ANXIETY: ICD-10-CM

## 2023-05-13 PROCEDURE — 77063 BREAST TOMOSYNTHESIS BI: CPT

## 2023-05-15 RX ORDER — DULOXETIN HYDROCHLORIDE 30 MG/1
CAPSULE, DELAYED RELEASE ORAL
Qty: 270 CAPSULE | Refills: 1 | Status: SHIPPED | OUTPATIENT
Start: 2023-05-15

## 2023-06-02 ENCOUNTER — HOSPITAL ENCOUNTER (OUTPATIENT)
Dept: CT IMAGING | Age: 58
Discharge: HOME | End: 2023-06-02
Payer: COMMERCIAL

## 2023-06-02 DIAGNOSIS — Q24.8 PERICARDIAL CYST: ICD-10-CM

## 2023-06-02 PROCEDURE — 71250 CT THORAX DX C-: CPT

## 2023-08-21 RX ORDER — OLMESARTAN MEDOXOMIL 20 MG/1
TABLET ORAL
Qty: 45 TABLET | Refills: 2 | OUTPATIENT
Start: 2023-08-21

## 2023-09-08 ENCOUNTER — OFFICE VISIT (OUTPATIENT)
Dept: INTERNAL MEDICINE CLINIC | Age: 58
End: 2023-09-08
Payer: COMMERCIAL

## 2023-09-08 VITALS
WEIGHT: 198.2 LBS | BODY MASS INDEX: 33.02 KG/M2 | HEART RATE: 73 BPM | DIASTOLIC BLOOD PRESSURE: 72 MMHG | HEIGHT: 65 IN | OXYGEN SATURATION: 97 % | SYSTOLIC BLOOD PRESSURE: 110 MMHG

## 2023-09-08 DIAGNOSIS — R73.03 PRE-DIABETES: ICD-10-CM

## 2023-09-08 DIAGNOSIS — E78.5 MILD HYPERLIPIDEMIA: ICD-10-CM

## 2023-09-08 DIAGNOSIS — M35.00 SJOGREN SYNDROME, UNSPECIFIED (HCC): ICD-10-CM

## 2023-09-08 DIAGNOSIS — I10 ESSENTIAL HYPERTENSION: ICD-10-CM

## 2023-09-08 DIAGNOSIS — Z79.899 MEDICATION MANAGEMENT: ICD-10-CM

## 2023-09-08 DIAGNOSIS — I51.7 LVH (LEFT VENTRICULAR HYPERTROPHY): ICD-10-CM

## 2023-09-08 DIAGNOSIS — M85.89 OSTEOPENIA OF MULTIPLE SITES: ICD-10-CM

## 2023-09-08 DIAGNOSIS — F41.9 ANXIETY: ICD-10-CM

## 2023-09-08 DIAGNOSIS — E53.8 B12 DEFICIENCY: Primary | ICD-10-CM

## 2023-09-08 DIAGNOSIS — Q24.8 PERICARDIAL CYST: ICD-10-CM

## 2023-09-08 DIAGNOSIS — G43.109 MIGRAINE WITH AURA AND WITHOUT STATUS MIGRAINOSUS, NOT INTRACTABLE: ICD-10-CM

## 2023-09-08 DIAGNOSIS — L40.50 PSORIATIC ARTHRITIS (HCC): ICD-10-CM

## 2023-09-08 DIAGNOSIS — E55.9 VITAMIN D INSUFFICIENCY: ICD-10-CM

## 2023-09-08 DIAGNOSIS — F33.1 MODERATE EPISODE OF RECURRENT MAJOR DEPRESSIVE DISORDER (HCC): ICD-10-CM

## 2023-09-08 DIAGNOSIS — M79.7 FIBROMYALGIA: ICD-10-CM

## 2023-09-08 PROCEDURE — 3074F SYST BP LT 130 MM HG: CPT | Performed by: INTERNAL MEDICINE

## 2023-09-08 PROCEDURE — 99214 OFFICE O/P EST MOD 30 MIN: CPT | Performed by: INTERNAL MEDICINE

## 2023-09-08 PROCEDURE — 3078F DIAST BP <80 MM HG: CPT | Performed by: INTERNAL MEDICINE

## 2023-09-08 NOTE — PATIENT INSTRUCTIONS
Your bone density test shows some mild thinning of your bones, called osteopenia, which means that you have a mildly increased risk of fracture. I do recommend over the counter calcium with Magnesium and  vitamin D 500 mg or 600 mg once daily in addition to your vitamin D supplement. Weight bearing exercise- such as walking and strength training, may also help to improve your bone thickness. You do need to make follow ups with :  Dr. Merle Mills- cardiologist. Due to enlargement of your heart. You last saw him in February 2022.    Dr. Rina Jorge- cardiothoracic  surgeon for cyst in your chest. You did have the CT scan done in June and did not show any changes to the cyst.     Recommended vaccines:  Flu vaccine - late Sept/early Oct  COVID- late Sept/early Oct

## 2023-09-11 ENCOUNTER — TELEPHONE (OUTPATIENT)
Dept: CARDIOLOGY CLINIC | Age: 58
End: 2023-09-11

## 2023-09-11 NOTE — TELEPHONE ENCOUNTER
Pt called requesting appt with [facility], they would like an early AM appt Monday or a Friday due to working in [place]. Pls provide date and time for pt to scheduled appt with [facility].     Babar Pérez   [phone]

## 2023-09-15 RX ORDER — BUSPIRONE HYDROCHLORIDE 5 MG/1
5 TABLET ORAL 3 TIMES DAILY PRN
Qty: 90 TABLET | Refills: 1 | Status: SHIPPED | OUTPATIENT
Start: 2023-09-15

## 2023-09-17 PROBLEM — H53.8 BLURRY VISION: Status: RESOLVED | Noted: 2020-03-09 | Resolved: 2023-09-17

## 2023-09-17 PROBLEM — H04.123 DRY EYES: Status: RESOLVED | Noted: 2020-03-09 | Resolved: 2023-09-17

## 2023-09-17 PROBLEM — M25.531 PAIN AND SWELLING OF RIGHT WRIST: Status: RESOLVED | Noted: 2021-06-20 | Resolved: 2023-09-17

## 2023-09-17 PROBLEM — M25.431 PAIN AND SWELLING OF RIGHT WRIST: Status: RESOLVED | Noted: 2021-06-20 | Resolved: 2023-09-17

## 2023-09-17 PROBLEM — M85.89 OSTEOPENIA OF MULTIPLE SITES: Status: ACTIVE | Noted: 2023-09-17

## 2023-09-17 PROBLEM — R68.2 DRY MOUTH: Status: RESOLVED | Noted: 2020-03-09 | Resolved: 2023-09-17

## 2023-09-17 PROBLEM — Z63.8 STRESS DUE TO FAMILY TENSION: Status: RESOLVED | Noted: 2020-08-19 | Resolved: 2023-09-17

## 2023-09-17 PROBLEM — L50.9 URTICARIA: Status: RESOLVED | Noted: 2020-08-12 | Resolved: 2023-09-17

## 2023-09-17 PROBLEM — M54.2 NECK PAIN: Status: RESOLVED | Noted: 2019-08-23 | Resolved: 2023-09-17

## 2023-09-17 PROBLEM — R10.13 EPIGASTRIC PAIN: Status: RESOLVED | Noted: 2019-08-23 | Resolved: 2023-09-17

## 2023-09-17 PROBLEM — H57.11 EYE PAIN, RIGHT: Status: RESOLVED | Noted: 2022-05-17 | Resolved: 2023-09-17

## 2023-09-17 PROBLEM — Z79.899 HIGH RISK MEDICATION USE: Status: RESOLVED | Noted: 2021-11-03 | Resolved: 2023-09-17

## 2023-09-17 PROBLEM — F41.1 GAD (GENERALIZED ANXIETY DISORDER): Status: RESOLVED | Noted: 2020-10-14 | Resolved: 2023-09-17

## 2023-09-17 ASSESSMENT — ENCOUNTER SYMPTOMS
SHORTNESS OF BREATH: 0
CHEST TIGHTNESS: 0
CONSTIPATION: 0
DIARRHEA: 0

## 2023-09-25 RX ORDER — OLMESARTAN MEDOXOMIL 20 MG/1
TABLET ORAL
Qty: 45 TABLET | Refills: 2 | OUTPATIENT
Start: 2023-09-25

## 2023-10-06 ENCOUNTER — PATIENT MESSAGE (OUTPATIENT)
Dept: INTERNAL MEDICINE CLINIC | Age: 58
End: 2023-10-06

## 2023-10-06 RX ORDER — OLMESARTAN MEDOXOMIL 20 MG/1
10 TABLET ORAL DAILY
Qty: 45 TABLET | Refills: 2 | Status: SHIPPED | OUTPATIENT
Start: 2023-10-06

## 2023-10-16 DIAGNOSIS — G43.109 MIGRAINE WITH AURA AND WITHOUT STATUS MIGRAINOSUS, NOT INTRACTABLE: ICD-10-CM

## 2023-10-17 RX ORDER — AMITRIPTYLINE HYDROCHLORIDE 50 MG/1
TABLET, FILM COATED ORAL
Qty: 180 TABLET | Refills: 1 | Status: SHIPPED | OUTPATIENT
Start: 2023-10-17

## 2023-12-11 DIAGNOSIS — G43.109 MIGRAINE WITH AURA AND WITHOUT STATUS MIGRAINOSUS, NOT INTRACTABLE: ICD-10-CM

## 2023-12-11 RX ORDER — SUMATRIPTAN 100 MG/1
100 TABLET, FILM COATED ORAL
Qty: 9 TABLET | Refills: 1 | Status: SHIPPED | OUTPATIENT
Start: 2023-12-11 | End: 2023-12-11

## (undated) DEVICE — SHEARS LAP L45CM DIA5MM ULTRASONIC CRV TIP ADV HEMSTAS HARM

## (undated) DEVICE — STERILE POLYISOPRENE POWDER-FREE SURGICAL GLOVES: Brand: PROTEXIS

## (undated) DEVICE — TOTAL TRAY, DB, 100% SILI FOLEY, 16FR 10: Brand: MEDLINE

## (undated) DEVICE — LEGGINGS, PAIR, 31X48, STERILE: Brand: MEDLINE

## (undated) DEVICE — 30977 SEE SHARP - ENHANCED INTRAOPERATIVE LAPAROSCOPE CLEANING & DEFOGGING: Brand: 30977 SEE SHARP - ENHANCED INTRAOPERATIVE LAPAROSCOPE CLEANING & DEFOGGING

## (undated) DEVICE — LAPAROSCOPIC TROCAR SLEEVE/SINGLE USE: Brand: KII® LOW PROFILE OPTICAL ACCESS SYSTEM

## (undated) DEVICE — 3M™ IOBAN™ 2 ANTIMICROBIAL INCISE DRAPE 6650EZ: Brand: IOBAN™ 2

## (undated) DEVICE — GOWN SIRUS NONREIN XL W/TWL: Brand: MEDLINE INDUSTRIES, INC.

## (undated) DEVICE — TROCAR: Brand: KII FIOS FIRST ENTRY

## (undated) DEVICE — NEEDLE SPNL 22GA L3.5IN BLK HUB S STL REG WALL FIT STYL W/

## (undated) DEVICE — TK® QUICK LOAD® UNIT: Brand: TK® QUICK LOAD®

## (undated) DEVICE — TK® TI-KNOT® DEVICE: Brand: TK® TI-KNOT®

## (undated) DEVICE — SYRINGE MED 10ML TRNSLUC BRL PLUNG BLK MRK POLYPR CTRL

## (undated) DEVICE — CORD ES L10FT MPLR LAP

## (undated) DEVICE — Device

## (undated) DEVICE — [HIGH FLOW INSUFFLATOR,  DO NOT USE IF PACKAGE IS DAMAGED,  KEEP DRY,  KEEP AWAY FROM SUNLIGHT,  PROTECT FROM HEAT AND RADIOACTIVE SOURCES.]: Brand: PNEUMOSURE

## (undated) DEVICE — LAPAROSCOPY PACK: Brand: MEDLINE INDUSTRIES, INC.

## (undated) DEVICE — DEVICE SUT SHFT L34CM DIA 10MM 2 JAW LD UNIT ENDOSTCH

## (undated) DEVICE — SYRINGE MED 3ML CLR PLAS STD N CTRL LUERLOCK TIP DISP

## (undated) DEVICE — ADHESIVE SKIN CLSR 0.7ML TOP DERMBND ADV

## (undated) DEVICE — DRAPE,LAP,CHOLE,W/TROUGHS,STERILE: Brand: MEDLINE

## (undated) DEVICE — CHLORAPREP 26ML ORANGE

## (undated) DEVICE — UNIVERSAL BLOCK TRAY: Brand: AVANOS*

## (undated) DEVICE — STANDARD HYPODERMIC NEEDLE,POLYPROPYLENE HUB: Brand: MONOJECT

## (undated) DEVICE — PORT VLV 2 W NDL FREE SMRTSITE

## (undated) DEVICE — COVER LT HNDL BLU PLAS

## (undated) DEVICE — SOLUTION IV IRRIG POUR BRL 0.9% SODIUM CHL 2F7124

## (undated) DEVICE — Device: Brand: JELCO

## (undated) DEVICE — SUTURE MCRYL SZ 4-0 L18IN ABSRB UD L19MM PS-2 3/8 CIR PRIM Y496G

## (undated) DEVICE — TROCAR SLEEVE: Brand: KII ® LOW PROFILE SLEEVE

## (undated) DEVICE — GLOVE SURG SZ 6.5 L11.2IN FNGR THK9.8MIL STRW LTX POLYMER

## (undated) DEVICE — SUTURE SZ 0 27IN 5/8 CIR UR-6  TAPER PT VIOLET ABSRB VICRYL J603H

## (undated) DEVICE — PAD TABLE RAMPED 1 IN TO 2 IN TRENDELENBURG

## (undated) DEVICE — HYPODERMIC SAFETY NEEDLE: Brand: MAGELLAN

## (undated) DEVICE — SYRINGE MED 10ML LUERLOCK TIP W/O SFTY DISP

## (undated) DEVICE — MEDIA CONTRAST RX ISOVUE-300 61% 30ML VIALS

## (undated) DEVICE — PEN: MARKING STD 100/CS: Brand: MEDICAL ACTION INDUSTRIES

## (undated) DEVICE — NEEDLE SPNL 22GA L2.5IN BLK QNCKE BVL DISP

## (undated) DEVICE — DEVICE SUT 0 L48IN GRN POLY BRAID LD UNIT DISP SURGDAC

## (undated) DEVICE — TOWEL,OR,DSP,ST,BLUE,STD,4/PK,20PK/CS: Brand: MEDLINE

## (undated) DEVICE — MERCY FAIRFIELD TURNOVER KIT: Brand: MEDLINE INDUSTRIES, INC.

## (undated) DEVICE — PLUMEPORT LAPAROSCOPIC SMOKE FILTRATION DEVICE: Brand: PLUMEPORT ACTIV

## (undated) DEVICE — GOWN SIRUS NONREIN LG W/TWL: Brand: MEDLINE INDUSTRIES, INC.